# Patient Record
Sex: FEMALE | Race: WHITE | NOT HISPANIC OR LATINO | Employment: OTHER | URBAN - METROPOLITAN AREA
[De-identification: names, ages, dates, MRNs, and addresses within clinical notes are randomized per-mention and may not be internally consistent; named-entity substitution may affect disease eponyms.]

---

## 2017-01-05 ENCOUNTER — TELEPHONE (OUTPATIENT)
Dept: SPINE | Facility: CLINIC | Age: 67
End: 2017-01-05

## 2017-01-05 NOTE — TELEPHONE ENCOUNTER
----- Message from Maryann Delgadillo sent at 1/4/2017  2:51 PM CST -----  Contact: Self  Good afternoon,     Pt is requesting an appt due to back pain.    Pt can be reached at [phone].    Thank you!

## 2017-01-10 DIAGNOSIS — M54.16 LEFT LUMBAR RADICULOPATHY: ICD-10-CM

## 2017-01-10 DIAGNOSIS — M51.36 DDD (DEGENERATIVE DISC DISEASE), LUMBAR: Primary | ICD-10-CM

## 2017-01-10 DIAGNOSIS — M48.061 LUMBAR STENOSIS: ICD-10-CM

## 2017-01-27 RX ORDER — LEVOTHYROXINE SODIUM 100 UG/1
TABLET ORAL
Qty: 90 TABLET | Refills: 0 | Status: SHIPPED | OUTPATIENT
Start: 2017-01-27 | End: 2017-04-25 | Stop reason: SDUPTHER

## 2017-01-31 RX ORDER — GABAPENTIN 300 MG/1
CAPSULE ORAL
Qty: 360 CAPSULE | Refills: 3 | Status: SHIPPED | OUTPATIENT
Start: 2017-01-31 | End: 2018-02-25 | Stop reason: SDUPTHER

## 2017-02-09 ENCOUNTER — PATIENT MESSAGE (OUTPATIENT)
Dept: FAMILY MEDICINE | Facility: CLINIC | Age: 67
End: 2017-02-09

## 2017-02-09 ENCOUNTER — PATIENT MESSAGE (OUTPATIENT)
Dept: SPINE | Facility: CLINIC | Age: 67
End: 2017-02-09

## 2017-02-09 ENCOUNTER — PATIENT MESSAGE (OUTPATIENT)
Dept: PAIN MEDICINE | Facility: CLINIC | Age: 67
End: 2017-02-09

## 2017-02-09 DIAGNOSIS — Z12.39 BREAST CANCER SCREENING: ICD-10-CM

## 2017-02-09 DIAGNOSIS — Z12.31 ENCOUNTER FOR SCREENING MAMMOGRAM FOR MALIGNANT NEOPLASM OF BREAST: ICD-10-CM

## 2017-02-09 DIAGNOSIS — R53.83 FATIGUE, UNSPECIFIED TYPE: Primary | ICD-10-CM

## 2017-02-16 ENCOUNTER — OFFICE VISIT (OUTPATIENT)
Dept: PAIN MEDICINE | Facility: CLINIC | Age: 67
End: 2017-02-16
Payer: MEDICARE

## 2017-02-16 ENCOUNTER — TELEPHONE (OUTPATIENT)
Dept: PAIN MEDICINE | Facility: CLINIC | Age: 67
End: 2017-02-16

## 2017-02-16 VITALS
DIASTOLIC BLOOD PRESSURE: 80 MMHG | SYSTOLIC BLOOD PRESSURE: 120 MMHG | BODY MASS INDEX: 32.46 KG/M2 | TEMPERATURE: 99 F | HEART RATE: 74 BPM | WEIGHT: 198.13 LBS | RESPIRATION RATE: 18 BRPM

## 2017-02-16 DIAGNOSIS — M54.16 LUMBAR RADICULOPATHY: Primary | ICD-10-CM

## 2017-02-16 DIAGNOSIS — M48.061 LUMBAR STENOSIS: ICD-10-CM

## 2017-02-16 DIAGNOSIS — M47.816 FACET HYPERTROPHY OF LUMBAR REGION: ICD-10-CM

## 2017-02-16 PROBLEM — R07.9 CHEST PAIN WITH MODERATE RISK FOR CARDIAC ETIOLOGY: Status: ACTIVE | Noted: 2017-02-16

## 2017-02-16 PROBLEM — R07.9 CHEST PAIN: Status: ACTIVE | Noted: 2017-02-16

## 2017-02-16 PROBLEM — G47.33 OSA (OBSTRUCTIVE SLEEP APNEA): Status: ACTIVE | Noted: 2017-02-16

## 2017-02-16 PROBLEM — I34.0 MITRAL REGURGITATION: Status: ACTIVE | Noted: 2017-02-16

## 2017-02-16 PROCEDURE — 99499 UNLISTED E&M SERVICE: CPT | Mod: S$PBB,,, | Performed by: PHYSICIAN ASSISTANT

## 2017-02-16 PROCEDURE — 99999 PR PBB SHADOW E&M-EST. PATIENT-LVL IV: CPT | Mod: PBBFAC,,, | Performed by: PHYSICIAN ASSISTANT

## 2017-02-16 PROCEDURE — 99214 OFFICE O/P EST MOD 30 MIN: CPT | Mod: S$PBB,,, | Performed by: PHYSICIAN ASSISTANT

## 2017-02-16 PROCEDURE — 99214 OFFICE O/P EST MOD 30 MIN: CPT | Mod: PBBFAC,PO | Performed by: PHYSICIAN ASSISTANT

## 2017-02-16 RX ORDER — ALPRAZOLAM 0.5 MG/1
1 TABLET, ORALLY DISINTEGRATING ORAL ONCE AS NEEDED
Status: CANCELLED | OUTPATIENT
Start: 2017-03-14 | End: 2017-03-14

## 2017-02-16 NOTE — TELEPHONE ENCOUNTER
Patient takes aspirin as a preventative and has been instructed to stop 7 days prior to the injection.

## 2017-02-16 NOTE — PROGRESS NOTES
This note was completed with dictation software and grammatical errors may exist.    CC: back pain    HPI: The patient is a 66 year-old Female with a history of colon CA, sleep apnea, anemia, depression and postlaminectomy syndrome now with myelodysplastic syndrome who presents in referral from Dr. Villanueva.  She returns in follow-up today for back pain.  It has been one year since her last visit.  Since her last visit, she underwent removal spinal cord stimulator and L4/5 hardware on 3/16/16 with Dr. Aldrich.  She states that this was successful in significantly improving her left leg pain but she continues to have severe low back pain.  She describes this as across the low back and burning.  She reports bilateral leg pain about 4 times a week but not as consistent as her constant low back pain.  The leg pain can be in different locations, not always the same leg or the same spot on the leg.  She tried physical therapy recently but this made her worse.  She states that she went to 8 sessions.  She reports having bladder incontinence but this is not new.  She denies bowel incontinence, weakness or numbness.    Pain intervention history:  She is status post caudal epidural steroid injection on 5/7/14 with 90% relief lasting 3 days, then the pain slowly came back over 7 days.  She is status post caudal epidural steroid injection on 6/17/14 with 70% relief.  She is status post caudal epidural steroid injection on 9/10/15 with almost complete relief lasting 5-6 days.  She is status post left L3 and L5 transforaminal epidural steroid injections on 10/14/15 with 0% relief.  She is status post left L4 transforaminal epidural steroid injection on 1/13/16 with about 90% relief lasting one week.  She underwent removal spinal cord stimulator and L4/5 hardware on 3/16/16 with Dr. Aldrich.    ROS:She reports fatigue, joint pain.  Balance of review of systems is negative.    Medical, surgical, family and social history  reviewed elsewhere in record.    Medications/Allergies: See med card    Vitals:    02/16/17 0847   BP: 120/80   Pulse: 74   Resp: 18   Temp: 98.6 °F (37 °C)   TempSrc: Oral   Weight: 89.9 kg (198 lb 1.6 oz)   PainSc:   4   PainLoc: Back         Physical exam:  Gen: A and O x3, pleasant, well-groomed  Skin: No rashes or obvious lesions  HEENT: PERRLA  CVS: Regular rate and rhythm, normal S1 and S2, no murmurs.  Resp: Clear to auscultation bilaterally, no wheezes or rales.  Abdomen: Soft, NT/ND, normal bowel sounds present.  Musculoskeletal: Nonantalgic gait.    Neuro:  Lower extremities: 5/5 strength bilaterally  Reflexes: Patellar 0+, Achilles 2+ bilaterally.  Sensory:  Intact and symmetrical to light touch and pinprick in L2-S1 dermatomes bilaterally.    Lumbar spine:  Lumbar spine: ROM is full with flexion and extension with increased low back pain during and flexion and end extension.  Luca's test is negative bilaterally.  Supine straight leg raise causes back pain only.  Internal and external rotation of the hip causes no increased pain on either side.  Myofascial exam: Mild tenderness to palpation to the right lower lumbar paraspinous muscles.      Imaging:  MRI LUMBAR SPINE W/WO CONTRAST ON 8/22/06  1. MINIMAL L3-4 DISC BULGE CAUSING MINIMAL THECAL SAC COMPRESSION, AND THERE IS ALSO MILD BILATERAL DEGENERATIVE FACET ARTHROSIS.   2. L4-5 DISCECTOMY AND L5 LAMINECTOMY WITH NO EVIDENCE OF A SIGNIFICANT SPINAL CANAL COMPROMISE. THERE IS MILD BILATERAL FORAMINAL NARROWING AT THE L4-5 LEVEL   3. DEGENERATION AND MILD BULGING OF THE L5-S1 DISC WITH ASSOCIATED MILD THECAL SAC COMPRESSION.   4. ROUNDED MASS ARISING FROM THE LEFT KIDNEY WHICH MOST LIKELY REPRESENTS A CYST. ULTRASOUND IS RECOMMENDED TO EXCLUDE A RENAL NEOPLASM.    2/11/14 x-ray tib-fib:  No obvious right tibial or fibular fracture is noted. No worrisome abnormality is noted. A linear metallic density is noted on the lateral view overlying the forefoot  dorsally near the navicular cuneiform articulation. This could relate to a foreign body or overlying density relating to clothing. Please clinically correlate a small quadriceps patellar spur is noted.    10/1/15 CT lumbar spine  T12/L1: Mild disk bulging is evident and degenerative facet changes are noted at this level. The canal and foramina are grossly intact.  L1/L2: Mild annular disk bulge and mild degenerative facet changes produce only minimal canal and foraminal distortion to  L2/L3: Degenerative facet changes noted in the mild annular disk and facet disease.  L3/L4: Posterior canal ligamentous hypertrophy and degenerative facet changes are present. There is moderate annular disk bulging. This combines to produce mild canal and left greater than right foraminal narrowing. This is slightly worsened from the previous exam.  L4/L5: The canal is grossly intact as the surgical level. Degenerative facet changes are noted bilaterally.  L5/S1: Moderate degenerative facet changes noted bilaterally and there is suggestion of disk bulging with left greater than right foraminal narrowing.    11/16/16 MRI lumbar spine with and without contrast  There have been prior laminectomies at L4 and L5, with interbody spacer present at L4-5.  Vertebral body height and alignment are within normal limits.  Marrow signal is heterogeneous.  There is moderate marrow enhancement within the L4 and L5 vertebral bodies centered about the disc space which is likely reactive.  The conus terminates at T12-L1.  Disc desiccation is present throughout the lumbar spine.  Mild loss of disc height is present at the L5-S1 level.  L1-L2:  No disc herniation. No spinal canal or neuroforaminal narrowing.  L2-L3:  No disc herniation. No spinal canal or neuroforaminal narrowing.  L3-L4:  There is mild diffuse disc bulging and moderate facet arthropathy with significant thickening of the ligament of flavum.  The findings contribute to mild spinal canal  narrowing without significant neuroforaminal narrowing.  L4-L5:  Moderate bilateral neuroforaminal narrowing is present which appears predominantly due to facet arthropathy, with some contribution from enhancing scar tissue formation within the neuroforamen on the right.  No spinal canal narrowing.  L5-S1:  There is mild broad-based posterior bulging of the disc, abdomen and moderate bilateral facet arthropathy.  There is resultant mild bilateral neuroforaminal narrowing.      Assessment:  The patient is a 66year-old Female with a history of colon CA, sleep apnea, anemia, depression and postlaminectomy syndrome now with myelodysplastic syndrome who presents in referral from Dr. Villanueva.  1. Lumbar radiculopathy     2. Facet hypertrophy of lumbar region     3. Lumbar stenosis         Plan:  1.  I reviewed the patient's lumbar spine MRI with her and we discussed that she does have mild canal stenosis at L3-4 as well as bilateral foraminal narrowing at L4-5 and L5-S1 that can explain her pain.  I will schedule her for a caudal NIC to cover several levels.  This can be repeated if she has some relief but not full.  If she does not have any relief, her pain may be facet mediated and consideration can be made for bilateral L3/4an L5/S1 facet joint injections versus medial branch blocks for those levels.  2.  Follow-up in 4 weeks post procedure or sooner as needed.

## 2017-02-17 PROBLEM — K59.00 CONSTIPATION: Status: ACTIVE | Noted: 2017-02-17

## 2017-02-17 PROBLEM — I24.9 ACS (ACUTE CORONARY SYNDROME): Status: ACTIVE | Noted: 2017-02-17

## 2017-02-27 ENCOUNTER — TELEPHONE (OUTPATIENT)
Dept: CARDIOLOGY | Facility: CLINIC | Age: 67
End: 2017-02-27

## 2017-02-27 ENCOUNTER — HOSPITAL ENCOUNTER (OUTPATIENT)
Dept: RADIOLOGY | Facility: HOSPITAL | Age: 67
Discharge: HOME | End: 2017-02-27
Attending: FAMILY MEDICINE
Payer: MEDICARE

## 2017-02-27 ENCOUNTER — PATIENT MESSAGE (OUTPATIENT)
Dept: FAMILY MEDICINE | Facility: CLINIC | Age: 67
End: 2017-02-27

## 2017-02-27 ENCOUNTER — PATIENT MESSAGE (OUTPATIENT)
Dept: CARDIOLOGY | Facility: CLINIC | Age: 67
End: 2017-02-27

## 2017-02-27 DIAGNOSIS — R92.8 MAMMOGRAM ABNORMAL: ICD-10-CM

## 2017-02-27 PROCEDURE — 77065 DX MAMMO INCL CAD UNI: CPT | Mod: 26,LT,, | Performed by: RADIOLOGY

## 2017-02-27 PROCEDURE — 77061 BREAST TOMOSYNTHESIS UNI: CPT | Mod: TC,LT

## 2017-02-27 PROCEDURE — 77061 BREAST TOMOSYNTHESIS UNI: CPT | Mod: 26,LT,, | Performed by: RADIOLOGY

## 2017-02-27 NOTE — TELEPHONE ENCOUNTER
You'll have to look at my schedule, this is a bad week, Tuesday is a holiday and I will not be here from Friday through the weekend.  If it is not emergent then I can see her in the next few weeks.  If the patient needs more urgent follow-up then she should follow with transitional care clinic as with most hospital discharges.

## 2017-02-27 NOTE — TELEPHONE ENCOUNTER
Please see pt message below. Please advise when pt can dorinda worked in for hospital follow up for chest pain.

## 2017-03-06 ENCOUNTER — OFFICE VISIT (OUTPATIENT)
Dept: CARDIOLOGY | Facility: CLINIC | Age: 67
End: 2017-03-06
Payer: MEDICARE

## 2017-03-06 VITALS
DIASTOLIC BLOOD PRESSURE: 64 MMHG | BODY MASS INDEX: 31.24 KG/M2 | HEART RATE: 66 BPM | WEIGHT: 199.06 LBS | HEIGHT: 67 IN | SYSTOLIC BLOOD PRESSURE: 113 MMHG

## 2017-03-06 DIAGNOSIS — I34.0 NON-RHEUMATIC MITRAL REGURGITATION: ICD-10-CM

## 2017-03-06 DIAGNOSIS — I48.0 PAROXYSMAL ATRIAL FIBRILLATION: Primary | Chronic | ICD-10-CM

## 2017-03-06 DIAGNOSIS — I25.119 ATHEROSCLEROSIS OF NATIVE CORONARY ARTERY OF NATIVE HEART WITH ANGINA PECTORIS: ICD-10-CM

## 2017-03-06 DIAGNOSIS — I48.91 ATRIAL FIBRILLATION, UNSPECIFIED TYPE: Primary | ICD-10-CM

## 2017-03-06 DIAGNOSIS — D61.818 PANCYTOPENIA: ICD-10-CM

## 2017-03-06 DIAGNOSIS — R94.39 ABNORMAL THALLIUM STRESS TEST: ICD-10-CM

## 2017-03-06 PROCEDURE — 99213 OFFICE O/P EST LOW 20 MIN: CPT | Mod: S$PBB,,, | Performed by: INTERNAL MEDICINE

## 2017-03-06 PROCEDURE — 99499 UNLISTED E&M SERVICE: CPT | Mod: S$PBB,,, | Performed by: INTERNAL MEDICINE

## 2017-03-06 PROCEDURE — 99999 PR PBB SHADOW E&M-EST. PATIENT-LVL III: CPT | Mod: PBBFAC,,, | Performed by: INTERNAL MEDICINE

## 2017-03-06 PROCEDURE — 99213 OFFICE O/P EST LOW 20 MIN: CPT | Mod: PBBFAC,PO | Performed by: INTERNAL MEDICINE

## 2017-03-06 NOTE — MR AVS SNAPSHOT
[facility]  [address] [address]  Phone: [phone]                  Charmaine Whalen   3/6/2017 2:20 PM   Office Visit    Description:  Female : 1950   Provider:  Harlan Alcantar MD   Department:  [facility] - Cardiology           Reason for Visit     Post-op Evaluation     Chest Pain           Diagnoses this Visit        Comments    Paroxysmal atrial fibrillation    -  Primary     Non-rheumatic mitral regurgitation         Atherosclerosis of native coronary artery of native heart with angina pectoris         Pancytopenia         Abnormal thallium stress test                To Do List           Future Appointments        Provider Department Dept Phone    3/15/2017 1:40 PM Shashi Villanueva MD [facility] Family [facility] [phone]    4/10/2017 3:00 PM RUMA Serra [facility] Pain Management [phone]      Your Future Surgeries/Procedures     Mar 14, 2017   Surgery with Adan Samuels MD   [facility]    [address] [facility] [id number] [phone]              Goals (5 Years of Data)     None      Follow-Up and Disposition     Return in about 6 months (around 2017).      [facility]     [facility] -  Assistance  Registered nurses in the [facility] provide clinical advisement, health education, appointment booking, and other advisory services.  Call for this free service at [phone].             Medications           Message regarding Medications     Verify the changes and/or additions to your medication regime listed below are the same as discussed with your clinician today.  If any of these changes or additions are incorrect, please notify your healthcare provider.             Verify that the below list of medications is an accurate representation of the medications you are currently taking.  If none reported, the list may be blank. If  "incorrect, please contact your healthcare provider. Carry this list with you in case of emergency.           Current Medications     ascorbic acid (VITAMIN C) 1000 MG tablet Take 1,000 mg by mouth every morning.     aspirin (ECOTRIN) 81 MG EC tablet Take 1 tablet (81 mg total) by mouth once daily.    azelastine (ASTELIN) 137 mcg (0.1 %) nasal spray 1 spray (137 mcg total) by Nasal route 2 (two) times daily.    b complex vitamins tablet Take 1 tablet by mouth every morning.     buPROPion (WELLBUTRIN XL) 300 MG 24 hr tablet Take 1 tablet (300 mg total) by mouth every morning.    cholecalciferol, vitamin D3, (VITAMIN D) 1,000 unit capsule Take 2,000 Units by mouth every morning.     docusate sodium (STOOL SOFTENER) 100 mg capsule Take 100 mg by mouth 2 (two) times daily.    fluoxetine (PROZAC) 40 MG capsule Take 1 capsule (40 mg total) by mouth every morning.    fluticasone (FLONASE) 50 mcg/actuation nasal spray 2 SPRS IN EACH NOSTRIL EVERY DAY    gabapentin (NEURONTIN) 300 MG capsule TAKE 2 CAPSULES BY MOUTH TWICE DAILY    L. ACIDOPHILUS/BIFIDO LONGUM (PROBIOTIC PEARLS ORAL) Take by mouth.    Lactobacillus rhamnosus GG (CULTURELLE) 10 billion cell capsule Take 1 capsule by mouth once daily.    levothyroxine (SYNTHROID) 100 MCG tablet TAKE 1 TABLET BY MOUTH EVERY DAY    nystatin (MYCOSTATIN) cream Apply topically 2 (two) times daily.    pantoprazole (PROTONIX) 40 MG tablet TAKE 1 TABLET(40 MG) BY MOUTH EVERY DAY    ranitidine (ZANTAC) 300 MG tablet TAKE 1 TABLET BY MOUTH EVERY EVENING ABOUT 30 TO 60 MINUTES BEFORE BEDTIME    trazodone (DESYREL) 100 MG tablet TAKE 1 TO 2 TABLETS BY MOUTH NIGHTLY FOR SLEEP           Clinical Reference Information           Your Vitals Were     BP Pulse Height Weight BMI    113/64 (BP Location: Left arm, Patient Position: Sitting, BP Method: Automatic) 66 5' 7" (1.702 m) 90.3 kg (199 lb 1.2 oz) 31.18 kg/m2      Blood Pressure          Most Recent Value    BP  113/64      Allergies as of " 3/6/2017     Sulfa (Sulfonamide Antibiotics)      Immunizations Administered on Date of Encounter - 3/6/2017     None      Language Assistance Services     ATTENTION: Language assistance services are available, free of charge. Please call [phone].      ATENCIÓN: Si bria hearn, tiene a iverson disposición servicios gratuitos de asistencia lingüística. Llame al [phone].     CHÚ Ý: N?u b?n nói Ti?ng Vi?t, có các d?ch v? h? tr? ngôn ng? mi?n phí dành cho b?n. G?i s? [phone].         [facility] complies with applicable Federal civil rights laws and does not discriminate on the basis of race, color, national origin, age, disability, or sex.

## 2017-03-06 NOTE — PROGRESS NOTES
Subjective:    Patient ID:  Charmaine Whalen is a 67 y.o. female who presents for follow-up of Post-op Evaluation ([facility] 02/18/17) and Chest Pain (pain scale 8/10)      HPI   Here for follow up of recent angiogram/MR. Still mild RAMIREZ.       Review of Systems   Constitution: Negative for malaise/fatigue.   Eyes: Negative for blurred vision.   Cardiovascular: Negative for chest pain, claudication, cyanosis, dyspnea on exertion, irregular heartbeat, leg swelling, near-syncope, orthopnea, palpitations, paroxysmal nocturnal dyspnea and syncope.   Respiratory: Negative for cough and shortness of breath.    Hematologic/Lymphatic: Does not bruise/bleed easily.   Musculoskeletal: Negative for back pain, falls, joint pain, muscle cramps, muscle weakness and myalgias.   Gastrointestinal: Negative for abdominal pain, change in bowel habit, nausea and vomiting.   Genitourinary: Negative for urgency.   Neurological: Negative for dizziness, focal weakness and light-headedness.        Objective:    Physical Exam   Constitutional: She is oriented to person, place, and time. She appears well-developed and well-nourished.   Neck: Normal range of motion. No JVD present.   Cardiovascular: Normal rate, regular rhythm, normal heart sounds and intact distal pulses.    Pulmonary/Chest: Effort normal and breath sounds normal.   Neurological: She is alert and oriented to person, place, and time.   Skin: Skin is warm and dry.   Psychiatric: She has a normal mood and affect.   Nursing note and vitals reviewed.            ..    Chemistry        Component Value Date/Time     02/17/2017 2116    K 4.1 02/17/2017 2116     02/17/2017 2116    CO2 29 02/17/2017 2116    BUN 14 02/17/2017 2116    CREATININE 0.87 02/17/2017 2116     (H) 02/17/2017 2116        Component Value Date/Time    CALCIUM 8.8 02/17/2017 2116    ALKPHOS 95 02/16/2017 1819    AST 34 02/16/2017 1819    AST 24 11/09/2015 1449    ALT 43 02/16/2017 1819    BILITOT 0.7  02/16/2017 1819            ..  Lab Results   Component Value Date    CHOL 143 02/17/2017    CHOL 184 01/08/2015    CHOL 194 07/23/2013     Lab Results   Component Value Date    HDL 43 02/17/2017    HDL 53 01/08/2015    HDL 44 07/23/2013     Lab Results   Component Value Date    LDLCALC 86.6 02/17/2017    LDLCALC 116.4 01/08/2015    LDLCALC 130.0 07/23/2013     Lab Results   Component Value Date    TRIG 67 02/17/2017    TRIG 73 01/08/2015    TRIG 98 07/23/2013     Lab Results   Component Value Date    CHOLHDL 30.1 02/17/2017    CHOLHDL 28.8 01/08/2015    CHOLHDL 22.7 07/23/2013     ..  Lab Results   Component Value Date    WBC 3.75 (L) 02/17/2017    HGB 10.1 (L) 02/17/2017    HCT 31.0 (L) 02/17/2017     (H) 02/17/2017     (L) 02/17/2017       Test(s) Reviewed  I have reviewed the following in detail:  [] Stress test   [x] Angiography   [x] Echocardiogram   [] Labs   [] Other:       Assessment:         ICD-10-CM ICD-9-CM   1. Paroxysmal atrial fibrillation I48.0 427.31   2. Non-rheumatic mitral regurgitation I34.0 424.0   3. Atherosclerosis of native coronary artery of native heart with angina pectoris I25.119 414.01     413.9   4. Pancytopenia D61.818 284.19   5. Abnormal thallium stress test R94.39 794.39     Problem List Items Addressed This Visit     Abnormal thallium stress test    Overview     2/17  Mild, moderate diagonal disease.  Small distal LAD.  Mild diastolic dysfunction.         AF (atrial fibrillation) - Primary (Chronic)    Overview     PAF in setting of iatrogenic hyperthyroid                                    s/p AFL RFA 2008         Atherosclerotic heart disease of native coronary artery with angina pectoris    Overview     2/17 [facility]  Mild, moderate diagonal disease.  Small distal LAD.  Mild diastolic dysfunction.         Mitral regurgitation    Pancytopenia           Plan:           Return to clinic 6 months   Low level/low impact aerobic exercise 5x's/wk. Heart healthy diet and risk  factor modification.    See labs and med orders.  CFD 6 month follow MR

## 2017-03-13 ENCOUNTER — PATIENT MESSAGE (OUTPATIENT)
Dept: FAMILY MEDICINE | Facility: CLINIC | Age: 67
End: 2017-03-13

## 2017-03-13 ENCOUNTER — TELEPHONE (OUTPATIENT)
Dept: PAIN MEDICINE | Facility: CLINIC | Age: 67
End: 2017-03-13

## 2017-03-13 ENCOUNTER — HOSPITAL ENCOUNTER (OUTPATIENT)
Dept: RADIOLOGY | Facility: HOSPITAL | Age: 67
Discharge: HOME | End: 2017-03-13
Attending: FAMILY MEDICINE
Payer: MEDICARE

## 2017-03-13 DIAGNOSIS — R05.9 COUGH: Primary | ICD-10-CM

## 2017-03-13 DIAGNOSIS — R05.9 COUGH: ICD-10-CM

## 2017-03-13 PROCEDURE — 71020 XR CHEST PA AND LATERAL: CPT | Mod: TC,PO

## 2017-03-13 PROCEDURE — 71020 XR CHEST PA AND LATERAL: CPT | Mod: 26,,, | Performed by: RADIOLOGY

## 2017-03-13 NOTE — TELEPHONE ENCOUNTER
----- Message from Kierra Vieira sent at 3/13/2017  9:10 AM CDT -----  Contact: lai   Calling to cancel injection   Call back

## 2017-03-13 NOTE — TELEPHONE ENCOUNTER
See my chart message.  I entered the order for the chest x-ray.  Let's not wait until Wednesday have her come in today or tomorrow morning at the latest for the x-ray

## 2017-03-13 NOTE — TELEPHONE ENCOUNTER
Patient being seen on Wed and would like to know if you would like a chest xray before. Please advise. Patient has chest congestion, see message below.

## 2017-03-14 ENCOUNTER — HOSPITAL ENCOUNTER (OUTPATIENT)
Dept: RADIOLOGY | Facility: HOSPITAL | Age: 67
Discharge: HOME | End: 2017-03-14
Attending: ANESTHESIOLOGY
Payer: MEDICARE

## 2017-03-14 DIAGNOSIS — M51.36 DDD (DEGENERATIVE DISC DISEASE), LUMBAR: ICD-10-CM

## 2017-03-15 ENCOUNTER — OFFICE VISIT (OUTPATIENT)
Dept: FAMILY MEDICINE | Facility: CLINIC | Age: 67
End: 2017-03-15
Payer: MEDICARE

## 2017-03-15 VITALS
RESPIRATION RATE: 18 BRPM | SYSTOLIC BLOOD PRESSURE: 118 MMHG | HEIGHT: 67 IN | HEART RATE: 60 BPM | BODY MASS INDEX: 31.35 KG/M2 | DIASTOLIC BLOOD PRESSURE: 72 MMHG | WEIGHT: 199.75 LBS

## 2017-03-15 DIAGNOSIS — J06.9 UPPER RESPIRATORY TRACT INFECTION, UNSPECIFIED TYPE: Primary | ICD-10-CM

## 2017-03-15 DIAGNOSIS — R05.9 COUGH: ICD-10-CM

## 2017-03-15 DIAGNOSIS — I25.119 ATHEROSCLEROSIS OF NATIVE CORONARY ARTERY OF NATIVE HEART WITH ANGINA PECTORIS: ICD-10-CM

## 2017-03-15 PROCEDURE — 99213 OFFICE O/P EST LOW 20 MIN: CPT | Mod: PBBFAC,PO | Performed by: FAMILY MEDICINE

## 2017-03-15 PROCEDURE — 99999 PR PBB SHADOW E&M-EST. PATIENT-LVL III: CPT | Mod: PBBFAC,,, | Performed by: FAMILY MEDICINE

## 2017-03-15 PROCEDURE — 99214 OFFICE O/P EST MOD 30 MIN: CPT | Mod: S$PBB,,, | Performed by: FAMILY MEDICINE

## 2017-03-15 PROCEDURE — 99499 UNLISTED E&M SERVICE: CPT | Mod: S$PBB,,, | Performed by: FAMILY MEDICINE

## 2017-03-15 RX ORDER — CODEINE PHOSPHATE AND GUAIFENESIN 10; 100 MG/5ML; MG/5ML
5 SOLUTION ORAL 3 TIMES DAILY PRN
Qty: 240 ML | Refills: 0 | Status: SHIPPED | OUTPATIENT
Start: 2017-03-15 | End: 2017-04-15

## 2017-03-15 NOTE — PROGRESS NOTES
Subjective:     THIS DOCUMENT WAS MADE IN PART WITH [employer] DICTATION SOFTWARE. OCCASIONALLY THIS SOFTWARE MAY MISINTERPRET WORDS OR PHRASES.     Patient ID: Charmaine Whalen is a 67 y.o. female.    Chief Complaint: Hospital Follow Up (chest pains); Cough (week ); and Nasal Congestion    Cough   This is a new problem. The current episode started 1 to 4 weeks ago. The problem has been waxing and waning. The cough is productive of sputum. Associated symptoms include chest pain, a fever, headaches, nasal congestion, postnasal drip, rhinorrhea, a sore throat, shortness of breath and wheezing (better now). The symptoms are aggravated by lying down. Treatments tried: mucinex, claritin. The treatment provided mild relief. There is no history of COPD.      I did review her recent hospitalization. She was admitted with chest pain. Stress test did suggest possible myocardial ischemia. She underwent catheterization which did reveal noninclusive coronary artery disease. It was felt that her symptoms may be G.I. Related. She is doing better. But does report some chest pain sometimes after eating some indigestion as well.    Active Ambulatory Problems     Diagnosis Date Noted    Generalized anxiety disorder 07/24/2012    Lattice degeneration of peripheral retina, bilateral 12/12/2012    Posterior vitreous detachment 12/12/2012    Nuclear sclerosis 12/12/2012    Vitreous hemorrhage 12/12/2012    Retinal tear 12/12/2012    AF (atrial fibrillation) 12/28/2012    MDS (myelodysplastic syndrome) 01/28/2013    Hypothyroidism 01/28/2013    History of colon cancer 07/03/2013    RAMIREZ (dyspnea on exertion) 07/03/2013    Major depressive disorder, recurrent episode, mild 09/21/2013    Pancytopenia 11/18/2013    DDD (degenerative disc disease), lumbar 03/14/2014    Degeneration of lumbar or lumbosacral intervertebral disc 05/07/2014    Retinal break of left eye 10/29/2014    Colon cancer screening 06/19/2015    Spondylosis  of lumbar region without myelopathy or radiculopathy 10/06/2015    Lumbar stenosis 10/06/2015    GERD (gastroesophageal reflux disease) 12/01/2015    Lumbar radiculopathy 01/13/2016    Chronic back pain 03/08/2016    Chronic pain syndrome 03/08/2016    Encounter for postoperative wound check 04/19/2016    Need for rabies vaccination 10/10/2016    Chest pain with moderate risk for cardiac etiology 02/16/2017    CORWIN (obstructive sleep apnea) 02/16/2017    Mitral regurgitation 02/16/2017    Chest pain 02/16/2017    Abnormal thallium stress test     Atherosclerotic heart disease of native coronary artery with angina pectoris     ACS (acute coronary syndrome) 02/17/2017    Constipation 02/17/2017     Resolved Ambulatory Problems     Diagnosis Date Noted    No Resolved Ambulatory Problems     Past Medical History:   Diagnosis Date    *Atrial fibrillation     Allergy     Anticoagulant long-term use     Anxiety     Atherosclerotic heart disease of native coronary artery with angina pectoris     Cataract     Chronic pain     Colon cancer     Colon polyp     DDD (degenerative disc disease), lumbar     Depression     Diverticulosis     Encounter for blood transfusion     Esophageal stenosis     Fibromyalgia     Gastritis     GERD (gastroesophageal reflux disease)     Hiatal hernia     History of psychiatric hospitalization     History of renal calculi     Hypersomnia with sleep apnea     Hypothyroidism 1/28/2013    Irritable bowel syndrome     Macular degeneration     Meningitis     Myelodysplastic syndrome     CORWIN (obstructive sleep apnea)     Personal history of colonic polyps     PVD (posterior vitreous detachment)     Retinal detachment     Rheumatic fever     Supraventricular tachycardia          Review of Systems   Constitutional: Positive for activity change and fever. Negative for unexpected weight change.   HENT: Positive for postnasal drip, rhinorrhea, sore throat and  trouble swallowing. Negative for hearing loss.    Eyes: Positive for discharge and visual disturbance.   Respiratory: Positive for cough, chest tightness, shortness of breath and wheezing (better now).    Cardiovascular: Positive for chest pain and palpitations.   Gastrointestinal: Positive for constipation and diarrhea. Negative for blood in stool and vomiting.   Endocrine: Positive for polydipsia and polyuria.   Genitourinary: Negative for difficulty urinating, dysuria, hematuria and menstrual problem.   Musculoskeletal: Negative for arthralgias and joint swelling.   Neurological: Positive for weakness and headaches.   Psychiatric/Behavioral: Positive for dysphoric mood. Negative for confusion.       Objective:      Physical Exam   Constitutional: She is oriented to person, place, and time. She appears well-developed and well-nourished.   HENT:   Head: Normocephalic and atraumatic.   Right Ear: External ear normal.   Left Ear: External ear normal.   Mouth/Throat: Oropharynx is clear and moist. No oropharyngeal exudate.   Eyes: Conjunctivae are normal. Pupils are equal, round, and reactive to light. Right eye exhibits no discharge. Left eye exhibits no discharge. No scleral icterus.   Neck: Normal range of motion. Neck supple. No thyromegaly present.   Cardiovascular: Normal rate, regular rhythm and normal heart sounds.    No murmur heard.  Pulmonary/Chest: Effort normal and breath sounds normal. No respiratory distress.   Both lung fields were completely clear no wheezing no crackles.   Lymphadenopathy:     She has no cervical adenopathy.   Neurological: She is alert and oriented to person, place, and time.   Skin: Skin is dry. No rash noted. She is not diaphoretic.   Psychiatric: She has a normal mood and affect. Her behavior is normal.   Vitals reviewed.     Reviewed the chest x-ray from two days ago. I personally look at the images as well as the radiology impartation. No evidence of acute  abnormality.  Assessment:       1. Upper respiratory tract infection, unspecified type    2. Cough    3. Atherosclerosis of native coronary artery of native heart with angina pectoris        Plan:       Charmaine was seen today for hospital follow up, cough and nasal congestion.    Diagnoses and all orders for this visit:    Upper respiratory tract infection, unspecified type  -     guaifenesin-codeine 100-10 mg/5 ml (TUSSI-ORGANIDIN NR)  mg/5 mL syrup; Take 5 mLs by mouth 3 (three) times daily as needed for Cough.     Suspected viral respiratory syndrome with URI components and resolving bronchitis. No indication for antibiotic currently. Although she will let me know if there's any worsening. Narcotic precautions and Guaifenisin with codeine to help with symptoms temporarily    Cough   as above    Atherosclerosis of native coronary artery of native heart with angina pectoris   I did review her recent hospitalization and discuss this with her as well.    Other orders   Follow as needed

## 2017-03-19 ENCOUNTER — PATIENT MESSAGE (OUTPATIENT)
Dept: FAMILY MEDICINE | Facility: CLINIC | Age: 67
End: 2017-03-19

## 2017-03-20 ENCOUNTER — TELEPHONE (OUTPATIENT)
Dept: FAMILY MEDICINE | Facility: CLINIC | Age: 67
End: 2017-03-20

## 2017-03-20 NOTE — TELEPHONE ENCOUNTER
----- Message from Nusrat Harrison sent at 3/17/2017  4:27 PM CDT -----  Contact: self [phone]  She is still contending with the bad cough.  She is asking that you prescribe antibiotics.  Thank you!

## 2017-03-20 NOTE — TELEPHONE ENCOUNTER
----- Message from Phyllis Gongora sent at 3/20/2017  9:15 AM CDT -----  Patient is returning office call but states it is not necessary to be on antibiotics. She knows it is viral and will see a pulmonologist. If any questions, please call at [phone].

## 2017-03-31 ENCOUNTER — TELEPHONE (OUTPATIENT)
Dept: PAIN MEDICINE | Facility: CLINIC | Age: 67
End: 2017-03-31

## 2017-03-31 ENCOUNTER — PATIENT MESSAGE (OUTPATIENT)
Dept: PAIN MEDICINE | Facility: CLINIC | Age: 67
End: 2017-03-31

## 2017-03-31 ENCOUNTER — PATIENT MESSAGE (OUTPATIENT)
Dept: FAMILY MEDICINE | Facility: CLINIC | Age: 67
End: 2017-03-31

## 2017-04-02 ENCOUNTER — PATIENT MESSAGE (OUTPATIENT)
Dept: PAIN MEDICINE | Facility: CLINIC | Age: 67
End: 2017-04-02

## 2017-04-03 ENCOUNTER — TELEPHONE (OUTPATIENT)
Dept: PAIN MEDICINE | Facility: CLINIC | Age: 67
End: 2017-04-03

## 2017-04-03 NOTE — TELEPHONE ENCOUNTER
Patient is scheduled for epidural steroid injection on 4/25 and will need to stop Asprin 7 days prior. Please advise if this is okay. Thanks.

## 2017-04-25 ENCOUNTER — HOSPITAL ENCOUNTER (OUTPATIENT)
Dept: RADIOLOGY | Facility: HOSPITAL | Age: 67
Discharge: HOME | End: 2017-04-25
Attending: ANESTHESIOLOGY | Admitting: ANESTHESIOLOGY
Payer: MEDICARE

## 2017-04-25 ENCOUNTER — HOSPITAL ENCOUNTER (OUTPATIENT)
Facility: HOSPITAL | Age: 67
Discharge: HOME | End: 2017-04-25
Attending: ANESTHESIOLOGY | Admitting: ANESTHESIOLOGY
Payer: MEDICARE

## 2017-04-25 ENCOUNTER — SURGERY (OUTPATIENT)
Age: 67
End: 2017-04-25

## 2017-04-25 DIAGNOSIS — M54.16 LUMBAR RADICULOPATHY: ICD-10-CM

## 2017-04-25 PROCEDURE — 25000003 PHARM REV CODE 250: Mod: PO | Performed by: ANESTHESIOLOGY

## 2017-04-25 PROCEDURE — 62323 NJX INTERLAMINAR LMBR/SAC: CPT | Mod: PO | Performed by: ANESTHESIOLOGY

## 2017-04-25 PROCEDURE — 62322 NJX INTERLAMINAR LMBR/SAC: CPT | Mod: PO | Performed by: ANESTHESIOLOGY

## 2017-04-25 PROCEDURE — 25500020 PHARM REV CODE 255: Mod: PO | Performed by: ANESTHESIOLOGY

## 2017-04-25 PROCEDURE — 62323 NJX INTERLAMINAR LMBR/SAC: CPT | Mod: ,,, | Performed by: ANESTHESIOLOGY

## 2017-04-25 RX ORDER — SODIUM CHLORIDE 9 MG/ML
INJECTION, SOLUTION INTRAMUSCULAR; INTRAVENOUS; SUBCUTANEOUS
Status: DISCONTINUED | OUTPATIENT
Start: 2017-04-25 | End: 2017-04-25 | Stop reason: HOSPADM

## 2017-04-25 RX ORDER — LIDOCAINE HYDROCHLORIDE 10 MG/ML
INJECTION, SOLUTION EPIDURAL; INFILTRATION; INTRACAUDAL; PERINEURAL
Status: DISCONTINUED | OUTPATIENT
Start: 2017-04-25 | End: 2017-04-25 | Stop reason: HOSPADM

## 2017-04-25 RX ORDER — ALPRAZOLAM 0.5 MG/1
1 TABLET, ORALLY DISINTEGRATING ORAL ONCE AS NEEDED
Status: COMPLETED | OUTPATIENT
Start: 2017-04-25 | End: 2017-04-25

## 2017-04-25 RX ORDER — LEVOTHYROXINE SODIUM 100 UG/1
TABLET ORAL
Qty: 90 TABLET | Refills: 1 | Status: SHIPPED | OUTPATIENT
Start: 2017-04-25 | End: 2017-10-15 | Stop reason: SDUPTHER

## 2017-04-25 RX ADMIN — SODIUM CHLORIDE 4 ML: 9 INJECTION INTRAMUSCULAR; INTRAVENOUS; SUBCUTANEOUS at 03:04

## 2017-04-25 RX ADMIN — ALPRAZOLAM 0.5 MG: 0.5 TABLET, ORALLY DISINTEGRATING ORAL at 02:04

## 2017-04-25 RX ADMIN — IOHEXOL 3 ML: 300 INJECTION, SOLUTION INTRAVENOUS at 03:04

## 2017-04-25 RX ADMIN — LIDOCAINE HYDROCHLORIDE 5 ML: 10 INJECTION, SOLUTION EPIDURAL; INFILTRATION; INTRACAUDAL; PERINEURAL at 03:04

## 2017-04-25 NOTE — H&P
CC: Back pain    HPI: The patient is a 68yo woman with a history of lumbar radiculopathy here for caudal NIC. There are no major changes in history and physical from 2/16/17.    Past Medical History:   Diagnosis Date    *Atrial fibrillation     History of Cardiac Ablation    Allergy     Anticoagulant long-term use     ASA 81 mg    Anxiety     Atherosclerotic heart disease of native coronary artery with angina pectoris     2/17 LHC Mild, moderate diagonal disease. Small distal LAD. Mild diastolic dysfunction.    Cataract     OU    Chronic pain     Leg, lower back    Colon cancer     Colon polyp     DDD (degenerative disc disease), lumbar     h/o sciatica    Depression     Diverticulosis     Encounter for blood transfusion     Esophageal stenosis     Fibromyalgia     Gastritis     GERD (gastroesophageal reflux disease)     hiatal as well    Hiatal hernia     History of psychiatric hospitalization     1997 at [facility]    History of renal calculi     Hypersomnia with sleep apnea     uses C-pap    Hypothyroidism 1/28/2013    Hashimoto's, with Hypothyroidism    Irritable bowel syndrome     Macular degeneration     lattice-OU    Meningitis     age 3    Myelodysplastic syndrome     bone marrow cancer    CORWIN (obstructive sleep apnea)     Personal history of colonic polyps     PVD (posterior vitreous detachment)     OU    Retinal detachment     HST-OS    Rheumatic fever     as a child    Supraventricular tachycardia        Past Surgical History:   Procedure Laterality Date    APPENDECTOMY      ATRIAL ABLATION SURGERY      BACK SURGERY      Right Spinal Cord Stimulator    BONE MARROW BIOPSY      myelodysplastic syndrome    CHOLECYSTECTOMY  11/26/2001    Laparascopic    COLON SURGERY  ~2011  ([facility]?  [facility])    Colon Resection (sigmoid?)    COLONOSCOPY  6/2015    Dr. orlando, repeat in 3 years    COLONOSCOPY W/ BIOPSIES AND POLYPECTOMY  5/05/2010  [facility]    COLONOSCOPY W/ POLYPECTOMY   "6/19/2015  Michael    Four 1 to 4 mm polyps at the hepatic flexure and distal ascending colon.  -Tubulovillous adenoma.   Diverticulosis in the sigmoid colon.  Patent end-to-end ileo-colonic anastomosis in the proximal/mid ascending colon.   Redundant colon.      Epidural Steroid Injection      Previous injection 17 years ago with Dr. Pang, Pain Management, 1 with Dr. Samuels    ESOPHAGOGASTRODUODENOSCOPY  11/06/2008    GERD & Gastritis    EYE SURGERY      Focal Laser OU    HYSTERECTOMY      ovaries spared    Lumbar Steroid Injection      Pain management    SCS REMOVAL  3/16/16    [facility]    SPINAL FUSION  1999    L4-L5    SPINE SURGERY  3/16/16    L4-5 PEDICLE SCREW & RODS REMOVED/[facility]    TUBAL LIGATION      UPPER GASTROINTESTINAL ENDOSCOPY  12/2015    Dr. Rodriguez    VAGINAL DELIVERY      times 3       Family History   Problem Relation Age of Onset    COPD Mother     Heart disease Mother     Cancer Mother      Breast    Stroke Father     Heart failure Father     Diabetes Father     Diabetes Sister        Social History     Social History    Marital status:      Spouse name: N/A    Number of children: N/A    Years of education: N/A     Social History Main Topics    Smoking status: Never Smoker    Smokeless tobacco: Never Used    Alcohol use No    Drug use: No    Sexual activity: Not Currently     Other Topics Concern    None     Social History Narrative       No current facility-administered medications for this encounter.        Review of patient's allergies indicates:   Allergen Reactions    Sulfa (sulfonamide antibiotics)      Other reaction(s): Hives       Vitals:    04/24/17 1346 04/25/17 1411   BP:  135/75   Pulse:  64   Resp:  18   Temp:  98.1 °F (36.7 °C)   TempSrc:  Skin   SpO2:  100%   Weight: 88.5 kg (195 lb)    Height: 5' 7" (1.702 m)        REVIEW OF SYSTEMS:     GENERAL: No weight loss, malaise or fevers.  HEENT:  No recent changes in vision or hearing  NECK: " Negative for lumps, no difficulty with swallowing.  RESPIRATORY: Negative for cough, wheezing or shortness of breath, patient denies any recent URI.  CARDIOVASCULAR: Negative for chest pain, leg swelling or palpitations.  GI: Negative for abdominal discomfort, blood in stools or black stools or change in bowel habits.  MUSCULOSKELETAL: See HPI.  SKIN: Negative for lesions, rash, and itching.  PSYCH: No suicidal or homicidal ideations, no current mood disturbances.  HEMATOLOGY/LYMPHOLOGY: Negative for prolonged bleeding, bruising easily or swollen nodes. Patient is not currently taking any anti-coagulants  ENDO: No history of diabetes or thyroid dysfunction  NEURO: No history of syncope, paralysis, seizures or tremors.All other reviewed and negative other than HPI.    Physical exam:  Gen: A and O x3, pleasant, well-groomed  Skin: No rashes or obvious lesions  HEENT: PERRLA, no obvious deformities on ears or in canals. No thyroid masses, trachea midline, no palpable lymph nodes in neck, axilla.  CVS: Regular rate and rhythm, normal S1 and S2, no murmurs.  Resp: Clear to auscultation bilaterally.  Abdomen: Soft, NT/ND, normal bowel sounds present.  Musculoskeletal/Neuro: Moving all extremities    Assessment:  Lumbar radiculopathy  -     Case Request Operating Room: INJECTION-STEROID-EPIDURAL-CAUDAL  -     Activity as tolerated; Standing  -     Place in Outpatient; Standing  -     Diet NPO; Standing  -     alprazolam ODT dissolvable tablet 1 mg; Take 2 tablets (1 mg total) by mouth once as needed for Anxiety.  -     Notify physician ; Standing  -     Notify physician ; Standing  -     Notify physician (specify); Standing  -     Verify informed consent; Standing  -     Vital signs; Standing

## 2017-04-25 NOTE — DISCHARGE SUMMARY
[facility]  Discharge Note  Short Stay    Admit Date: 4/25/2017    Discharge Date: 4/25/2017    Attending Physician: Adan Samuels MD     Discharge Provider: Adan Samuels    Diagnoses:  Active Hospital Problems    Diagnosis  POA    *Lumbar radiculopathy [M54.16]  Yes      Resolved Hospital Problems    Diagnosis Date Resolved POA   No resolved problems to display.       Discharged Condition: good    Final Diagnoses: Lumbar radiculopathy [M54.16]    Disposition: Home or Self Care    Hospital Course: no complications, uneventful    Outcome of Hospitalization, Treatment, Procedure, or Surgery:  Patient was admitted for outpatient procedure. The patient underwent procedure without complications and are discharged home    Follow up/Patient Instructions:  Follow up as scheduled/Patient has received instructions and follow up date    Medications:  Continue previous medications      Discharge Procedure Orders  Diet general     Activity as tolerated     Call MD for:  temperature >100.4     Call MD for:  severe uncontrolled pain     Call MD for:  redness, tenderness, or signs of infection (pain, swelling, redness, odor or green/yellow discharge around incision site)     Call MD for:  severe persistent headache     No dressing needed           Discharge Procedure Orders (must include Diet, Follow-up, Activity):    Discharge Procedure Orders (must include Diet, Follow-up, Activity)  Diet general     Activity as tolerated     Call MD for:  temperature >100.4     Call MD for:  severe uncontrolled pain     Call MD for:  redness, tenderness, or signs of infection (pain, swelling, redness, odor or green/yellow discharge around incision site)     Call MD for:  severe persistent headache     No dressing needed

## 2017-04-25 NOTE — IP AVS SNAPSHOT
[facility]  [address]  [place] [address]  Phone: [phone]           Patient Discharge Instructions   Our goal is to set you up for success. This packet includes information on your condition, medications, and your home care.  It will help you care for yourself to prevent having to return to the hospital.     Please ask your nurse if you have any questions.      There are many details to remember when preparing to leave the hospital. Here is what you will need to do:    1. Take your medicine. If you are prescribed medications, review your Medication List on the following pages. You may have new medications to  at the pharmacy and others that you'll need to stop taking. Review the instructions for how and when to take your medications. Talk with your doctor or nurses if you are unsure of what to do.     2. Go to your follow-up appointments. Specific follow-up information is listed in the following pages. Your may be contacted by a nurse or clinical provider about future appointments. Be sure we have all of the phone numbers to reach you. Please contact your provider's office if you are unable to make an appointment.     3. Watch for warning signs. Your doctor or nurse will give you detailed warning signs to watch for and when to call for assistance. These instructions may also include educational information about your condition. If you experience any of warning signs to your health, call your doctor.           [facility]  Unless otherwise directed by your provider, please   contact [facility], our nurse care line   that is available for 24/7 assistance.     [phone] (toll-free)     Registered nurses in the [facility]   provide: appointment scheduling, clinical advisement, health education, and other advisory services.                  ** Verify the list of medication(s) below is accurate and up to date. Carry this with you in case of  emergency. If your medications have changed, please notify your healthcare provider.             Medication List      CONTINUE taking these medications        Additional Info                      aspirin 81 MG EC tablet   Commonly known as:  ECOTRIN   Refills:  0   Dose:  81 mg    Instructions:  Take 1 tablet (81 mg total) by mouth once daily.     Begin Date    AM    Noon    PM    Bedtime       azelastine 137 mcg (0.1 %) nasal spray   Commonly known as:  ASTELIN   Quantity:  30 mL   Refills:  5   Dose:  1 spray    Instructions:  1 spray (137 mcg total) by Nasal route 2 (two) times daily.     Begin Date    AM    Noon    PM    Bedtime       b complex vitamins tablet   Refills:  0   Dose:  1 tablet    Instructions:  Take 1 tablet by mouth every morning.     Begin Date    AM    Noon    PM    Bedtime       buPROPion 300 MG 24 hr tablet   Commonly known as:  WELLBUTRIN XL   Quantity:  90 tablet   Refills:  4   Dose:  300 mg   Comments:  90 days supply    Instructions:  Take 1 tablet (300 mg total) by mouth every morning.     Begin Date    AM    Noon    PM    Bedtime       docusate sodium 100 mg capsule   Commonly known as:  STOOL SOFTENER   Refills:  0   Dose:  100 mg    Instructions:  Take 100 mg by mouth 2 (two) times daily.     Begin Date    AM    Noon    PM    Bedtime       fluoxetine 40 MG capsule   Commonly known as:  PROZAC   Quantity:  90 capsule   Refills:  4   Dose:  40 mg   Comments:  90 days supply    Instructions:  Take 1 capsule (40 mg total) by mouth every morning.     Begin Date    AM    Noon    PM    Bedtime       fluticasone 50 mcg/actuation nasal spray   Commonly known as:  FLONASE   Quantity:  48 g   Refills:  5   Comments:  **Patient requests 90 days supply**    Instructions:  2 SPRS IN EACH NOSTRIL EVERY DAY     Begin Date    AM    Noon    PM    Bedtime       gabapentin 300 MG capsule   Commonly known as:  NEURONTIN   Quantity:  360 capsule   Refills:  3    Instructions:  TAKE 2 CAPSULES BY MOUTH  TWICE DAILY     Begin Date    AM    Noon    PM    Bedtime       levothyroxine 100 MCG tablet   Commonly known as:  SYNTHROID   Quantity:  90 tablet   Refills:  1    Instructions:  TAKE 1 TABLET BY MOUTH EVERY DAY     Begin Date    AM    Noon    PM    Bedtime       nystatin cream   Commonly known as:  MYCOSTATIN   Quantity:  30 g   Refills:  2    Instructions:  Apply topically 2 (two) times daily.     Begin Date    AM    Noon    PM    Bedtime       pantoprazole 40 MG tablet   Commonly known as:  PROTONIX   Quantity:  90 tablet   Refills:  9   Comments:  **Patient requests 90 days supply**    Instructions:  TAKE 1 TABLET(40 MG) BY MOUTH EVERY DAY     Begin Date    AM    Noon    PM    Bedtime       PROBIOTIC PEARLS ORAL   Refills:  0    Instructions:  Take by mouth.     Begin Date    AM    Noon    PM    Bedtime       ranitidine 300 MG tablet   Commonly known as:  ZANTAC   Quantity:  90 tablet   Refills:  3    Instructions:  TAKE 1 TABLET BY MOUTH EVERY EVENING ABOUT 30 TO 60 MINUTES BEFORE BEDTIME     Begin Date    AM    Noon    PM    Bedtime       trazodone 100 MG tablet   Commonly known as:  DESYREL   Quantity:  180 tablet   Refills:  4   Comments:  90 days supply    Instructions:  TAKE 1 TO 2 TABLETS BY MOUTH NIGHTLY FOR SLEEP     Begin Date    AM    Noon    PM    Bedtime       VITAMIN C 1000 MG tablet   Refills:  0   Dose:  1000 mg   Generic drug:  ascorbic acid (vitamin C)    Instructions:  Take 1,000 mg by mouth every morning.     Begin Date    AM    Noon    PM    Bedtime       VITAMIN D3 1,000 unit capsule   Refills:  0   Dose:  2000 Units   Generic drug:  cholecalciferol (vitamin D3)    Instructions:  Take 2,000 Units by mouth every morning.     Begin Date    AM    Noon    PM    Bedtime                  Please bring to all follow up appointments:    1. A copy of your discharge instructions.  2. All medicines you are currently taking in their original bottles.  3. Identification and insurance card.    Please  arrive 15 minutes ahead of scheduled appointment time.    Please call 24 hours in advance if you must reschedule your appointment and/or time.        Your Scheduled Appointments     May 22, 2017  2:00 PM CDT   Established Patient Visit with Christiano Xavier MD   [facility] ([facility])    [address] [address]   [phone]            May 23, 2017  1:30 PM CDT   Established Patient Visit with RUMA Serra - [facility] Management ([facility] [place])    [address] [facility] [address] [place] [address]   [phone]                Discharge Instructions     Future Orders    Activity as tolerated     Call MD for:  redness, tenderness, or signs of infection (pain, swelling, redness, odor or green/yellow discharge around incision site)     Call MD for:  severe persistent headache     Call MD for:  severe uncontrolled pain     Call MD for:  temperature >100.4     Diet general     Questions:    Total calories:      Fat restriction, if any:      Protein restriction, if any:      Na restriction, if any:      Fluid restriction:      Additional restrictions:      No dressing needed         Discharge Instructions       Home care instructions  Apply ice pack to the injection site for 20 minutes periods for the first 24 hrs for soreness/discomfort at injection site DO NOT USE HEAT FOR 24 HOURS  Keep site clean and dry for 24 hours, remove bandaid when desired  Do not drive until tomorrow  Take care when walking after a lumbar injection  Avoid strenuous activities for 2 days  Make take 2 weeks to feel the full effects   Resume home medication as prescribed today  Resume Aspirin, Plavix, or Coumadin the day after the procedure unless otherwise instructed.    SEE IMMEDIATE MEDICAL HELP FOR:  Severe increase in your usual pain or appearance of new pain  Prolonged or increasing weakness or numbness in the legs or arms  Drainage, redness, active bleeding, or  "increased swelling at the injection site  Temperature over 100.0 degrees F.  Headache that increases when your head is upright and decreases when you lie flat    CALL 911 OR GO DIRECTLY TO EMERGENCY DEPARTMENT FOR:  Shortness of breath, chest pain, or problems breathing      Primary Diagnosis     Your primary diagnosis was:  Lumbar Nerve Root Disorder      Admission Information     Date & Time Provider Department CSN    4/25/2017  1:36 PM Adan Samuels MD [facility] [id number]      Care Providers     Provider Role Specialty Primary office phone    Adan Samuels MD Attending Provider Pain Medicine [phone]    Adan Samuels MD Surgeon  Pain Medicine [phone]      Your Vitals Were     BP Pulse Temp Resp Height Weight    121/77 (BP Location: Right arm, Patient Position: Sitting, BP Method: Automatic) 65 97.9 °F (36.6 °C) 16 5' 7" (1.702 m) 88.5 kg (195 lb)    SpO2 BMI             97% 30.54 kg/m2         Recent Lab Values        2/17/2017                           4:36 AM           A1C 5.0           Comment for A1C at  4:36 AM on 2/17/2017:  Reference Interval:  5.0 - 5.6 Normal   5.7 - 6.4 High Risk   > 6.5 Diabetic     Hgb A1c results are standardized based on the (NGSP) National   Glycohemoglobin Standardization Program.    Hemoglobin A1C levels are related to mean serum/plasma glucose   during the preceding 2-3 months.            Allergies as of 4/25/2017        Reactions    Sulfa (Sulfonamide Antibiotics) Hives    Other reaction(s): Hives      Advance Directives     An advance directive is a document which, in the event you are no longer able to make decisions for yourself, tells your healthcare team what kind of treatment you do or do not want to receive, or who you would like to make those decisions for you.  If you do not currently have an advance directive, [facility] encourages you to create one.  For more information call:  [phone] ([phone]), [phone] " ([phone]),  or log on to [email or web address].        Language Assistance Services     ATTENTION: Language assistance services are available, free of charge. Please call [phone].      ATENCIÓN: Si habla shadiañol, tiene a iverson disposición servicios gratuitos de asistencia lingüística. Llame al [phone].     CHÚ Ý: N?u b?n nói Ti?ng Vi?t, có các d?ch v? h? tr? ngôn ng? mi?n phí dành cho b?n. G?i s? [phone].         [facility] complies with applicable Federal civil rights laws and does not discriminate on the basis of race, color, national origin, age, disability, or sex.

## 2017-04-26 VITALS
HEIGHT: 67 IN | TEMPERATURE: 98 F | OXYGEN SATURATION: 97 % | HEART RATE: 65 BPM | BODY MASS INDEX: 30.61 KG/M2 | DIASTOLIC BLOOD PRESSURE: 77 MMHG | RESPIRATION RATE: 16 BRPM | SYSTOLIC BLOOD PRESSURE: 121 MMHG | WEIGHT: 195 LBS

## 2017-05-11 RX ORDER — TRAZODONE HYDROCHLORIDE 100 MG/1
TABLET ORAL
Qty: 180 TABLET | Refills: 0 | OUTPATIENT
Start: 2017-05-11

## 2017-06-01 ENCOUNTER — OFFICE VISIT (OUTPATIENT)
Dept: PAIN MEDICINE | Facility: CLINIC | Age: 67
End: 2017-06-01
Payer: MEDICARE

## 2017-06-01 VITALS
SYSTOLIC BLOOD PRESSURE: 130 MMHG | RESPIRATION RATE: 18 BRPM | TEMPERATURE: 99 F | BODY MASS INDEX: 30.87 KG/M2 | WEIGHT: 197.06 LBS | DIASTOLIC BLOOD PRESSURE: 80 MMHG | HEART RATE: 78 BPM

## 2017-06-01 DIAGNOSIS — M47.816 FACET HYPERTROPHY OF LUMBAR REGION: Primary | ICD-10-CM

## 2017-06-01 DIAGNOSIS — M48.061 LUMBAR STENOSIS: ICD-10-CM

## 2017-06-01 DIAGNOSIS — M51.36 DDD (DEGENERATIVE DISC DISEASE), LUMBAR: ICD-10-CM

## 2017-06-01 PROCEDURE — 99214 OFFICE O/P EST MOD 30 MIN: CPT | Mod: PBBFAC,PO | Performed by: PHYSICIAN ASSISTANT

## 2017-06-01 PROCEDURE — 99499 UNLISTED E&M SERVICE: CPT | Mod: S$PBB,,, | Performed by: PHYSICIAN ASSISTANT

## 2017-06-01 PROCEDURE — 99213 OFFICE O/P EST LOW 20 MIN: CPT | Mod: S$PBB,,, | Performed by: PHYSICIAN ASSISTANT

## 2017-06-01 PROCEDURE — 99999 PR PBB SHADOW E&M-EST. PATIENT-LVL IV: CPT | Mod: PBBFAC,,, | Performed by: PHYSICIAN ASSISTANT

## 2017-06-01 RX ORDER — SODIUM CHLORIDE, SODIUM LACTATE, POTASSIUM CHLORIDE, CALCIUM CHLORIDE 600; 310; 30; 20 MG/100ML; MG/100ML; MG/100ML; MG/100ML
INJECTION, SOLUTION INTRAVENOUS CONTINUOUS
Status: CANCELLED | OUTPATIENT
Start: 2017-07-14

## 2017-06-01 RX ORDER — MIDAZOLAM HYDROCHLORIDE 5 MG/ML
4 INJECTION INTRAMUSCULAR; INTRAVENOUS ONCE
Status: CANCELLED | OUTPATIENT
Start: 2017-07-14

## 2017-06-01 NOTE — PROGRESS NOTES
This note was completed with dictation software and grammatical errors may exist.    CC: back pain    HPI: The patient is a 67 year-old Female with a history of colon CA, sleep apnea, anemia, depression and postlaminectomy syndrome now with myelodysplastic syndrome who presents in referral from Dr. Villanueva.  She is status post caudal epidural steroid injection on 4/25/17 with 0% relief.  She complains of right greater than left low back pain with occasional radiation to her right greater than left lateral thighs.  Her low back pain is worse in the mornings and she states that it wakes her up just about every morning.  Otherwise, the pain is random and she has to change positions often throughout the day.  She denies having any weakness or numbness.  She continues to have bladder incontinence but denies bowel incontinence.    Pain intervention history:  She is status post caudal epidural steroid injection on 5/7/14 with 90% relief lasting 3 days, then the pain slowly came back over 7 days.  She is status post caudal epidural steroid injection on 6/17/14 with 70% relief.  She is status post caudal epidural steroid injection on 9/10/15 with almost complete relief lasting 5-6 days.  She is status post left L3 and L5 transforaminal epidural steroid injections on 10/14/15 with 0% relief.  She is status post left L4 transforaminal epidural steroid injection on 1/13/16 with about 90% relief lasting one week.  She underwent removal spinal cord stimulator and L4/5 hardware on 3/16/16 with Dr. Aldrich.  She is status post caudal epidural steroid injection on 4/25/17 with 0% relief.    ROS:She reports fatigue, joint pain.  Balance of review of systems is negative.    Medical, surgical, family and social history reviewed elsewhere in record.    Medications/Allergies: See med card    Vitals:    06/01/17 1322   BP: 130/80   Pulse: 78   Resp: 18   Temp: 99.3 °F (37.4 °C)   TempSrc: Oral   Weight: 89.4 kg (197 lb 1.5 oz)    PainSc:   4   PainLoc: Back         Physical exam:  Gen: A and O x3, pleasant, well-groomed  Skin: No rashes or obvious lesions  HEENT: PERRLA  CVS: Regular rate and rhythm, normal S1 and S2, no murmurs.  Resp: Clear to auscultation bilaterally, no wheezes or rales.  Abdomen: Soft, NT/ND, normal bowel sounds present.  Musculoskeletal: Nonantalgic gait.    Neuro:  Lower extremities: 5/5 strength bilaterally  Reflexes: Patellar 0+, Achilles 2+ bilaterally.  Sensory:  Intact and symmetrical to light touch and pinprick in L2-S1 dermatomes bilaterally.    Lumbar spine:  Lumbar spine: Range of motion is mildly limited with flexion and moderately limited with extension with increased pain in the right greater than left low back and right upper gluteal region during each maneuver but especially with extension and right oblique extension.  Luca's test is negative bilaterally.  Supine straight leg raise causes hamstring tightness only.  Internal and external rotation of the hip causes no increased pain on either side.  Myofascial exam: Exquisite tenderness to palpation to the right lower lumbar paraspinous muscles.      Imaging:  MRI LUMBAR SPINE W/WO CONTRAST ON 8/22/06  1. MINIMAL L3-4 DISC BULGE CAUSING MINIMAL THECAL SAC COMPRESSION, AND THERE IS ALSO MILD BILATERAL DEGENERATIVE FACET ARTHROSIS.   2. L4-5 DISCECTOMY AND L5 LAMINECTOMY WITH NO EVIDENCE OF A SIGNIFICANT SPINAL CANAL COMPROMISE. THERE IS MILD BILATERAL FORAMINAL NARROWING AT THE L4-5 LEVEL   3. DEGENERATION AND MILD BULGING OF THE L5-S1 DISC WITH ASSOCIATED MILD THECAL SAC COMPRESSION.   4. ROUNDED MASS ARISING FROM THE LEFT KIDNEY WHICH MOST LIKELY REPRESENTS A CYST. ULTRASOUND IS RECOMMENDED TO EXCLUDE A RENAL NEOPLASM.    2/11/14 x-ray tib-fib:  No obvious right tibial or fibular fracture is noted. No worrisome abnormality is noted. A linear metallic density is noted on the lateral view overlying the forefoot dorsally near the navicular cuneiform  articulation. This could relate to a foreign body or overlying density relating to clothing. Please clinically correlate a small quadriceps patellar spur is noted.    10/1/15 CT lumbar spine  T12/L1: Mild disk bulging is evident and degenerative facet changes are noted at this level. The canal and foramina are grossly intact.  L1/L2: Mild annular disk bulge and mild degenerative facet changes produce only minimal canal and foraminal distortion to  L2/L3: Degenerative facet changes noted in the mild annular disk and facet disease.  L3/L4: Posterior canal ligamentous hypertrophy and degenerative facet changes are present. There is moderate annular disk bulging. This combines to produce mild canal and left greater than right foraminal narrowing. This is slightly worsened from the previous exam.  L4/L5: The canal is grossly intact as the surgical level. Degenerative facet changes are noted bilaterally.  L5/S1: Moderate degenerative facet changes noted bilaterally and there is suggestion of disk bulging with left greater than right foraminal narrowing.    11/16/16 MRI lumbar spine with and without contrast  There have been prior laminectomies at L4 and L5, with interbody spacer present at L4-5.  Vertebral body height and alignment are within normal limits.  Marrow signal is heterogeneous.  There is moderate marrow enhancement within the L4 and L5 vertebral bodies centered about the disc space which is likely reactive.  The conus terminates at T12-L1.  Disc desiccation is present throughout the lumbar spine.  Mild loss of disc height is present at the L5-S1 level.  L1-L2:  No disc herniation. No spinal canal or neuroforaminal narrowing.  L2-L3:  No disc herniation. No spinal canal or neuroforaminal narrowing.  L3-L4:  There is mild diffuse disc bulging and moderate facet arthropathy with significant thickening of the ligament of flavum.  The findings contribute to mild spinal canal narrowing without significant  neuroforaminal narrowing.  L4-L5:  Moderate bilateral neuroforaminal narrowing is present which appears predominantly due to facet arthropathy, with some contribution from enhancing scar tissue formation within the neuroforamen on the right.  No spinal canal narrowing.  L5-S1:  There is mild broad-based posterior bulging of the disc, abdomen and moderate bilateral facet arthropathy.  There is resultant mild bilateral neuroforaminal narrowing.      Assessment:  The patient is a 67year-old Female with a history of colon CA, sleep apnea, anemia, depression and postlaminectomy syndrome now with myelodysplastic syndrome who presents in referral from Dr. Villanueva.  1. Facet hypertrophy of lumbar region     2. Lumbar stenosis     3. DDD (degenerative disc disease), lumbar         Plan:  1.  She denies having any relief following the caudal NIC.  I believe her pain is likely facet mediated.  She denies any mid or upper lumbar region pain and I believe her pain is due to the L5/S1 facet joints below her L4/5 fusion.  I will schedule her for bilateral L5/S1 facet joint injections.  If she has relief but not long-lasting, she may benefit from radiofrequency ablation of the bilateral L4 and L5 medial branch nerves.  2.  Follow-up in 4 weeks post procedure or sooner as needed.

## 2017-06-22 DIAGNOSIS — K21.9 GASTROESOPHAGEAL REFLUX DISEASE, ESOPHAGITIS PRESENCE NOT SPECIFIED: ICD-10-CM

## 2017-06-22 RX ORDER — PANTOPRAZOLE SODIUM 40 MG/1
TABLET, DELAYED RELEASE ORAL
Qty: 90 TABLET | Refills: 3 | Status: SHIPPED | OUTPATIENT
Start: 2017-06-22 | End: 2017-10-06 | Stop reason: SDUPTHER

## 2017-07-11 RX ORDER — VITAMIN E 268 MG
400 CAPSULE ORAL DAILY
COMMUNITY
End: 2018-06-04

## 2017-07-14 ENCOUNTER — HOSPITAL ENCOUNTER (OUTPATIENT)
Facility: HOSPITAL | Age: 67
Discharge: HOME | End: 2017-07-14
Attending: ANESTHESIOLOGY | Admitting: ANESTHESIOLOGY
Payer: MEDICARE

## 2017-07-14 ENCOUNTER — HOSPITAL ENCOUNTER (OUTPATIENT)
Dept: RADIOLOGY | Facility: HOSPITAL | Age: 67
Discharge: HOME | End: 2017-07-14
Attending: ANESTHESIOLOGY
Payer: MEDICARE

## 2017-07-14 ENCOUNTER — SURGERY (OUTPATIENT)
Age: 67
End: 2017-07-14

## 2017-07-14 VITALS
WEIGHT: 195 LBS | SYSTOLIC BLOOD PRESSURE: 140 MMHG | BODY MASS INDEX: 30.61 KG/M2 | RESPIRATION RATE: 16 BRPM | OXYGEN SATURATION: 96 % | TEMPERATURE: 98 F | HEIGHT: 67 IN | HEART RATE: 61 BPM | DIASTOLIC BLOOD PRESSURE: 69 MMHG

## 2017-07-14 DIAGNOSIS — M47.816 FACET HYPERTROPHY OF LUMBAR REGION: Primary | ICD-10-CM

## 2017-07-14 DIAGNOSIS — M51.36 DDD (DEGENERATIVE DISC DISEASE), LUMBAR: ICD-10-CM

## 2017-07-14 PROCEDURE — 63600175 PHARM REV CODE 636 W HCPCS: Mod: PO | Performed by: ANESTHESIOLOGY

## 2017-07-14 PROCEDURE — 71000033 HC RECOVERY, INTIAL HOUR: Mod: PO | Performed by: ANESTHESIOLOGY

## 2017-07-14 PROCEDURE — 76000 FLUOROSCOPY <1 HR PHYS/QHP: CPT | Mod: TC,PO

## 2017-07-14 PROCEDURE — 64493 INJ PARAVERT F JNT L/S 1 LEV: CPT | Mod: 50,,, | Performed by: ANESTHESIOLOGY

## 2017-07-14 PROCEDURE — 25000003 PHARM REV CODE 250: Mod: PO | Performed by: ANESTHESIOLOGY

## 2017-07-14 PROCEDURE — 25500020 PHARM REV CODE 255: Mod: PO | Performed by: ANESTHESIOLOGY

## 2017-07-14 PROCEDURE — 99152 MOD SED SAME PHYS/QHP 5/>YRS: CPT | Mod: ,,, | Performed by: ANESTHESIOLOGY

## 2017-07-14 PROCEDURE — 64493 INJ PARAVERT F JNT L/S 1 LEV: CPT | Mod: 50,PO | Performed by: ANESTHESIOLOGY

## 2017-07-14 RX ORDER — SODIUM CHLORIDE, SODIUM LACTATE, POTASSIUM CHLORIDE, CALCIUM CHLORIDE 600; 310; 30; 20 MG/100ML; MG/100ML; MG/100ML; MG/100ML
INJECTION, SOLUTION INTRAVENOUS CONTINUOUS
Status: DISCONTINUED | OUTPATIENT
Start: 2017-07-14 | End: 2017-07-14 | Stop reason: HOSPADM

## 2017-07-14 RX ORDER — BUPIVACAINE HYDROCHLORIDE 2.5 MG/ML
INJECTION, SOLUTION EPIDURAL; INFILTRATION; INTRACAUDAL
Status: DISCONTINUED | OUTPATIENT
Start: 2017-07-14 | End: 2017-07-14 | Stop reason: HOSPADM

## 2017-07-14 RX ORDER — MIDAZOLAM HYDROCHLORIDE 5 MG/ML
4 INJECTION INTRAMUSCULAR; INTRAVENOUS ONCE
Status: COMPLETED | OUTPATIENT
Start: 2017-07-14 | End: 2017-07-14

## 2017-07-14 RX ORDER — METHYLPREDNISOLONE ACETATE 80 MG/ML
INJECTION, SUSPENSION INTRA-ARTICULAR; INTRALESIONAL; INTRAMUSCULAR; SOFT TISSUE
Status: DISCONTINUED | OUTPATIENT
Start: 2017-07-14 | End: 2017-07-14 | Stop reason: HOSPADM

## 2017-07-14 RX ORDER — LIDOCAINE HYDROCHLORIDE 10 MG/ML
INJECTION, SOLUTION EPIDURAL; INFILTRATION; INTRACAUDAL; PERINEURAL
Status: DISCONTINUED | OUTPATIENT
Start: 2017-07-14 | End: 2017-07-14 | Stop reason: HOSPADM

## 2017-07-14 RX ADMIN — IOHEXOL 3 ML: 300 INJECTION, SOLUTION INTRAVENOUS at 04:07

## 2017-07-14 RX ADMIN — BUPIVACAINE HYDROCHLORIDE 3 ML: 2.5 INJECTION, SOLUTION EPIDURAL; INFILTRATION; INTRACAUDAL; PERINEURAL at 04:07

## 2017-07-14 RX ADMIN — METHYLPREDNISOLONE ACETATE 80 MG: 80 INJECTION, SUSPENSION INTRA-ARTICULAR; INTRALESIONAL; INTRAMUSCULAR; SOFT TISSUE at 04:07

## 2017-07-14 RX ADMIN — LIDOCAINE HYDROCHLORIDE 5 ML: 10 INJECTION, SOLUTION EPIDURAL; INFILTRATION; INTRACAUDAL; PERINEURAL at 04:07

## 2017-07-14 RX ADMIN — MIDAZOLAM HYDROCHLORIDE 4 MG: 5 INJECTION, SOLUTION INTRAMUSCULAR; INTRAVENOUS at 04:07

## 2017-07-14 RX ADMIN — SODIUM CHLORIDE, SODIUM LACTATE, POTASSIUM CHLORIDE, AND CALCIUM CHLORIDE: .6; .31; .03; .02 INJECTION, SOLUTION INTRAVENOUS at 03:07

## 2017-07-14 NOTE — H&P
CC: Back pain    HPI: The patient is a 68yo woman with a history of lumbar facet arthropathy here for bilateral L5/S1 facet joint injections. There are no major changes in history and physical from 6/1/17.    Past Medical History:   Diagnosis Date    *Atrial fibrillation     History of Cardiac Ablation    Allergy     Anticoagulant long-term use     ASA 81 mg    Anxiety     Atherosclerotic heart disease of native coronary artery with angina pectoris     2/17 LHC Mild, moderate diagonal disease. Small distal LAD. Mild diastolic dysfunction.    Cataract     OU    Chronic pain     Leg, lower back    Colon cancer     Colon polyp     DDD (degenerative disc disease), lumbar     h/o sciatica    Depression     Diverticulosis     Encounter for blood transfusion     Esophageal stenosis     Fibromyalgia     Gastritis     GERD (gastroesophageal reflux disease)     hiatal as well    Hiatal hernia     History of psychiatric hospitalization     1997 at [place]    History of renal calculi     Hypersomnia with sleep apnea     uses C-pap    Hypothyroidism 1/28/2013    Hashimoto's, with Hypothyroidism    Irritable bowel syndrome     Macular degeneration     lattice-OU    Meningitis     age 3    Myelodysplastic syndrome     bone marrow cancer    CORWIN (obstructive sleep apnea)     Personal history of colonic polyps     PVD (posterior vitreous detachment)     OU    Retinal detachment     HST-OS    Rheumatic fever     as a child    Supraventricular tachycardia        Past Surgical History:   Procedure Laterality Date    APPENDECTOMY      ATRIAL ABLATION SURGERY      BACK SURGERY      Right Spinal Cord Stimulator    BONE MARROW BIOPSY      myelodysplastic syndrome    CHOLECYSTECTOMY  11/26/2001    Laparascopic    COLON SURGERY  ~2011  ([facility]?  [facility])    Colon Resection (sigmoid?)    COLONOSCOPY  6/2015    Dr. orlando, repeat in 3 years    COLONOSCOPY W/ BIOPSIES AND POLYPECTOMY  5/05/2010  Ashish  "   COLONOSCOPY W/ POLYPECTOMY  6/19/2015  Michael    Four 1 to 4 mm polyps at the hepatic flexure and distal ascending colon.  -Tubulovillous adenoma.   Diverticulosis in the sigmoid colon.  Patent end-to-end ileo-colonic anastomosis in the proximal/mid ascending colon.   Redundant colon.      Epidural Steroid Injection      Previous injection 17 years ago with Dr. Pang, Pain Management, 1 with Dr. Samuels    ESOPHAGOGASTRODUODENOSCOPY  11/06/2008    GERD & Gastritis    EYE SURGERY      Focal Laser OU    HYSTERECTOMY      ovaries spared    Lumbar Steroid Injection      Pain management    SCS REMOVAL  3/16/16    [facility]    SPINAL FUSION  1999    L4-L5    SPINE SURGERY  3/16/16    L4-5 PEDICLE SCREW & RODS REMOVED/[facility]    TUBAL LIGATION      UPPER GASTROINTESTINAL ENDOSCOPY  12/2015    Dr. Rodriguez    VAGINAL DELIVERY      times 3       Family History   Problem Relation Age of Onset    COPD Mother     Heart disease Mother     Cancer Mother      Breast    Stroke Father     Heart failure Father     Diabetes Father     Diabetes Sister        Social History     Social History    Marital status:      Spouse name: N/A    Number of children: N/A    Years of education: N/A     Social History Main Topics    Smoking status: Never Smoker    Smokeless tobacco: Never Used    Alcohol use No    Drug use: No    Sexual activity: Not Currently     Other Topics Concern    None     Social History Narrative    None       No current facility-administered medications for this encounter.        Review of patient's allergies indicates:   Allergen Reactions    Sulfa (sulfonamide antibiotics) Hives     Other reaction(s): Hives       Vitals:    07/14/17 1457   BP: 131/77   Pulse: 60   Resp: 17   Temp: 97.7 °F (36.5 °C)   TempSrc: Skin   SpO2: 97%   Weight: 88.5 kg (195 lb)   Height: 5' 7" (1.702 m)       REVIEW OF SYSTEMS:     GENERAL: No weight loss, malaise or fevers.  HEENT:  No recent changes in vision " or hearing  NECK: Negative for lumps, no difficulty with swallowing.  RESPIRATORY: Negative for cough, wheezing or shortness of breath, patient denies any recent URI.  CARDIOVASCULAR: Negative for chest pain, leg swelling or palpitations.  GI: Negative for abdominal discomfort, blood in stools or black stools or change in bowel habits.  MUSCULOSKELETAL: See HPI.  SKIN: Negative for lesions, rash, and itching.  PSYCH: No suicidal or homicidal ideations, no current mood disturbances.  HEMATOLOGY/LYMPHOLOGY: Negative for prolonged bleeding, bruising easily or swollen nodes. Patient is not currently taking any anti-coagulants  ENDO: No history of diabetes or thyroid dysfunction  NEURO: No history of syncope, paralysis, seizures or tremors.All other reviewed and negative other than HPI.    Physical exam:  Gen: A and O x3, pleasant, well-groomed  Skin: No rashes or obvious lesions  HEENT: PERRLA, no obvious deformities on ears or in canals. No thyroid masses, trachea midline, no palpable lymph nodes in neck, axilla.  CVS: Regular rate and rhythm, normal S1 and S2, no murmurs.  Resp: Clear to auscultation bilaterally.  Abdomen: Soft, NT/ND, normal bowel sounds present.  Musculoskeletal/Neuro: Moving all extremities    Assessment:  Facet hypertrophy of lumbar region  -     Case Request Operating Room: INJECTION-FACET L5/S1  -     Activity as tolerated; Standing  -     Place in Outpatient; Standing  -     Diet NPO; Standing  -     lactated ringers infusion; Inject into the vein continuous.  -     midazolam (PF) injection 4 mg; Inject 0.8 mLs (4 mg total) into the vein once.  -     Notify physician ; Standing  -     Notify physician ; Standing  -     Notify physician (specify); Standing  -     Place 18-22 gaua peripheral IV ; Standing  -     Verify informed consent; Standing  -     Vital signs; Standing

## 2017-07-14 NOTE — OP NOTE
PROCEDURE DATE: 7/14/2017    PROCEDURE: Bilateral L5/S1 facet joint injection under fluoroscopy.    Diagnosis: Lumbar facet arthropathy    PHYSICIAN: Adan Samuels MD    MEDICATIONS INJECTED:  From a mixture of 3 ml of 0.25% bupivicaine and 80mg of methylprednisone, 0.9ml of solution was injected into each facet joint.    LOCAL ANESTHETIC USED:   Lidocaine 1%, 4 ml per level.    SEDATION MEDICATIONS: 4mg versed    ESTIMATED BLOOD LOSS:  none    COMPLICATIONS: none    TECHNIQUE:   A time-out was taken to identify the patient and procedure side prior to starting the procedure.  Lying in a prone position, the patient was prepped and draped in the usual sterile fashion using ChloraPrep and fenestrated drape.  The levels were determined under fluoroscopic guidance in the AP view and then rotated into the oblique view to visualize the joint space.  Local anesthetic was given by raising a wheal at the skin and then anesthetizing a tract using a 25-gauge, 1.5inch needle.  A 3.5 in 22-gauge needle was introduced into the right and then left L5/S1 facet joints.  Negative pressure applied to make sure that there was no intravascular placement.  Omnipaque contrast was injected to confirm placement and to confirm arthrogram and no vascular uptake.  Medication was then injected slowly.  The patient tolerated the procedure well.    The patient was monitored after the procedure.  Patient was given post procedure and discharge instructions to follow at home. The patient was discharged in a stable condition

## 2017-07-14 NOTE — DISCHARGE SUMMARY
[facility]  Discharge Note  Short Stay    Admit Date: 7/14/2017    Discharge Date: 7/14/2017    Attending Physician: Adan Samuels MD     Discharge Provider: Adan Samuels    Diagnoses:  Active Hospital Problems    Diagnosis  POA    *Facet hypertrophy of lumbar region [M47.896]  Yes      Resolved Hospital Problems    Diagnosis Date Resolved POA   No resolved problems to display.       Discharged Condition: good    Final Diagnoses: Facet hypertrophy of lumbar region [M47.896]    Disposition: Home or Self Care    Hospital Course: no complications, uneventful    Outcome of Hospitalization, Treatment, Procedure, or Surgery:  Patient was admitted for outpatient procedure. The patient underwent procedure without complications and are discharged home    Follow up/Patient Instructions:  Follow up as scheduled/Patient has received instructions and follow up date    Medications:  Continue previous medications      Discharge Procedure Orders  Diet general     Activity as tolerated     Call MD for:  temperature >100.4     Call MD for:  severe uncontrolled pain     Call MD for:  redness, tenderness, or signs of infection (pain, swelling, redness, odor or green/yellow discharge around incision site)     Call MD for:  severe persistent headache     No dressing needed           Discharge Procedure Orders (must include Diet, Follow-up, Activity):    Discharge Procedure Orders (must include Diet, Follow-up, Activity)  Diet general     Activity as tolerated     Call MD for:  temperature >100.4     Call MD for:  severe uncontrolled pain     Call MD for:  redness, tenderness, or signs of infection (pain, swelling, redness, odor or green/yellow discharge around incision site)     Call MD for:  severe persistent headache     No dressing needed

## 2017-07-15 RX ORDER — BUPROPION HYDROCHLORIDE 300 MG/1
TABLET ORAL
Qty: 90 TABLET | Refills: 0 | OUTPATIENT
Start: 2017-07-15

## 2017-07-17 ENCOUNTER — TELEPHONE (OUTPATIENT)
Dept: CARDIOLOGY | Facility: CLINIC | Age: 67
End: 2017-07-17

## 2017-07-17 NOTE — TELEPHONE ENCOUNTER
----- Message from Eusebia Sutherland sent at 7/17/2017 12:04 PM CDT -----  Contact: self  Patient [phone] is calling to schedule an appt with Dr Alcantar for as soon as possible as she has been having dizzy spells that are becoming more and more frequent/I do not show any available appts until toward the end of September 2017/please advise

## 2017-07-19 RX ORDER — FLUOXETINE HYDROCHLORIDE 40 MG/1
CAPSULE ORAL
Qty: 90 CAPSULE | Refills: 0 | OUTPATIENT
Start: 2017-07-19

## 2017-07-20 ENCOUNTER — PATIENT MESSAGE (OUTPATIENT)
Dept: PSYCHIATRY | Facility: CLINIC | Age: 67
End: 2017-07-20

## 2017-07-22 RX ORDER — BUPROPION HYDROCHLORIDE 300 MG/1
TABLET ORAL
Qty: 90 TABLET | Refills: 0 | Status: SHIPPED | OUTPATIENT
Start: 2017-07-22 | End: 2017-10-02 | Stop reason: SDUPTHER

## 2017-07-22 RX ORDER — FLUOXETINE HYDROCHLORIDE 40 MG/1
CAPSULE ORAL
Qty: 90 CAPSULE | Refills: 0 | Status: SHIPPED | OUTPATIENT
Start: 2017-07-22 | End: 2017-10-02 | Stop reason: SDUPTHER

## 2017-07-24 RX ORDER — TRAZODONE HYDROCHLORIDE 100 MG/1
TABLET ORAL
Qty: 180 TABLET | Refills: 0 | Status: SHIPPED | OUTPATIENT
Start: 2017-07-24 | End: 2017-10-02 | Stop reason: SDUPTHER

## 2017-08-11 ENCOUNTER — TELEPHONE (OUTPATIENT)
Dept: PAIN MEDICINE | Facility: CLINIC | Age: 67
End: 2017-08-11

## 2017-08-11 ENCOUNTER — OFFICE VISIT (OUTPATIENT)
Dept: PAIN MEDICINE | Facility: CLINIC | Age: 67
End: 2017-08-11
Payer: MEDICARE

## 2017-08-11 VITALS
SYSTOLIC BLOOD PRESSURE: 130 MMHG | RESPIRATION RATE: 18 BRPM | DIASTOLIC BLOOD PRESSURE: 70 MMHG | HEART RATE: 72 BPM | BODY MASS INDEX: 31.39 KG/M2 | TEMPERATURE: 99 F | WEIGHT: 200.38 LBS

## 2017-08-11 DIAGNOSIS — G89.4 CHRONIC PAIN ASSOCIATED WITH SIGNIFICANT PSYCHOSOCIAL DYSFUNCTION: ICD-10-CM

## 2017-08-11 DIAGNOSIS — M54.16 LUMBAR RADICULOPATHY: Primary | ICD-10-CM

## 2017-08-11 DIAGNOSIS — M54.16 LUMBAR RADICULITIS: ICD-10-CM

## 2017-08-11 PROCEDURE — 1125F AMNT PAIN NOTED PAIN PRSNT: CPT | Mod: ,,, | Performed by: PHYSICIAN ASSISTANT

## 2017-08-11 PROCEDURE — 99999 PR PBB SHADOW E&M-EST. PATIENT-LVL V: CPT | Mod: PBBFAC,,, | Performed by: PHYSICIAN ASSISTANT

## 2017-08-11 PROCEDURE — 3078F DIAST BP <80 MM HG: CPT | Mod: ,,, | Performed by: PHYSICIAN ASSISTANT

## 2017-08-11 PROCEDURE — 3075F SYST BP GE 130 - 139MM HG: CPT | Mod: ,,, | Performed by: PHYSICIAN ASSISTANT

## 2017-08-11 PROCEDURE — 99213 OFFICE O/P EST LOW 20 MIN: CPT | Mod: S$PBB,,, | Performed by: PHYSICIAN ASSISTANT

## 2017-08-11 PROCEDURE — 99499 UNLISTED E&M SERVICE: CPT | Mod: S$PBB,,, | Performed by: PHYSICIAN ASSISTANT

## 2017-08-11 PROCEDURE — 99215 OFFICE O/P EST HI 40 MIN: CPT | Mod: PBBFAC,PO | Performed by: PHYSICIAN ASSISTANT

## 2017-08-11 PROCEDURE — 1159F MED LIST DOCD IN RCRD: CPT | Mod: ,,, | Performed by: PHYSICIAN ASSISTANT

## 2017-08-11 RX ORDER — CEFAZOLIN SODIUM 2 G/50ML
2 SOLUTION INTRAVENOUS
Status: CANCELLED | OUTPATIENT
Start: 2017-09-18

## 2017-08-11 RX ORDER — SODIUM CHLORIDE, SODIUM LACTATE, POTASSIUM CHLORIDE, CALCIUM CHLORIDE 600; 310; 30; 20 MG/100ML; MG/100ML; MG/100ML; MG/100ML
INJECTION, SOLUTION INTRAVENOUS CONTINUOUS
Status: CANCELLED | OUTPATIENT
Start: 2017-09-18

## 2017-08-11 NOTE — TELEPHONE ENCOUNTER
Patient saw Haroon Hernandez this morning and has recommended a spinal cord stimulator trial. The patient takes aspirin and will need to stop 7 days before the procedure. Procedure is tentatively scheduled for 9/18 pending medication approval. Please advise.

## 2017-08-11 NOTE — TELEPHONE ENCOUNTER
MD Sade Lam, EWELINA 18 minutes ago (3:43 PM)      Ok to hold plavix/ASA for 5-7 days, resume ASAP post procedure.  (Routing comment)

## 2017-08-14 ENCOUNTER — PATIENT MESSAGE (OUTPATIENT)
Dept: DERMATOLOGY | Facility: CLINIC | Age: 67
End: 2017-08-14

## 2017-08-14 NOTE — PROGRESS NOTES
This note was completed with dictation software and grammatical errors may exist.    CC: back pain    HPI: The patient is a 67 year-old Female with a history of colon CA, sleep apnea, anemia, depression and postlaminectomy syndrome now with myelodysplastic syndrome who presents in referral from Dr. Villanueva.  She is status post bilateral L5/S1 facet joint injections on 7/14/17 with 0% relief.  She complains of bilateral low back pain radiating to the lateral thighs and right lower leg.  She is concerned because her pain has been existing since fall 2015.  Her pain is worse with activity, improved with rest.  However, she is never pain-free.  She denies weakness or numbness but does report some bladder incontinence.  She denies bowel incontinence.    Pain intervention history:  She is status post caudal epidural steroid injection on 5/7/14 with 90% relief lasting 3 days, then the pain slowly came back over 7 days.  She is status post caudal epidural steroid injection on 6/17/14 with 70% relief.  She is status post caudal epidural steroid injection on 9/10/15 with almost complete relief lasting 5-6 days.  She is status post left L3 and L5 transforaminal epidural steroid injections on 10/14/15 with 0% relief.  She is status post left L4 transforaminal epidural steroid injection on 1/13/16 with about 90% relief lasting one week.  She underwent removal spinal cord stimulator and L4/5 hardware on 3/16/16 with Dr. Aldrich.  She is status post caudal epidural steroid injection on 4/25/17 with 0% relief.  She is status post bilateral L5/S1 facet joint injections on 7/14/17 with 0% relief.     ROS:She reports fatigue, joint pain.  Balance of review of systems is negative.    Medical, surgical, family and social history reviewed elsewhere in record.    Medications/Allergies: See med card    Vitals:    08/11/17 1006   BP: 130/70   Pulse: 72   Resp: 18   Temp: 98.9 °F (37.2 °C)   TempSrc: Oral   Weight: 90.9 kg (200 lb 6.4  oz)   PainSc:   4   PainLoc: Back         Physical exam:  Gen: A and O x3, pleasant, well-groomed  Skin: No rashes or obvious lesions  HEENT: PERRLA  CVS: Regular rate and rhythm, normal S1 and S2, no murmurs.  Resp: Clear to auscultation bilaterally, no wheezes or rales.  Abdomen: Soft, NT/ND, normal bowel sounds present.  Musculoskeletal: Nonantalgic gait.    Neuro:  Lower extremities: 5/5 strength bilaterally  Reflexes: Patellar 0+, Achilles 2+ bilaterally.  Sensory:  Intact and symmetrical to light touch and pinprick in L2-S1 dermatomes bilaterally.    Lumbar spine:  Lumbar spine: Range of motion is mildly limited with flexion and moderately limited with extension with increased pain in the right greater than left low back and right upper gluteal region during each maneuver but especially with extension and right oblique extension.  Luca's test is negative bilaterally.  Supine straight leg raise causes right greater than left lateral thigh pain.  Internal and external rotation of the hip causes no increased pain on either side.  Myofascial exam: Exquisite tenderness to palpation to the right lower lumbar paraspinous muscles.      Imaging:  MRI LUMBAR SPINE W/WO CONTRAST ON 8/22/06  1. MINIMAL L3-4 DISC BULGE CAUSING MINIMAL THECAL SAC COMPRESSION, AND THERE IS ALSO MILD BILATERAL DEGENERATIVE FACET ARTHROSIS.   2. L4-5 DISCECTOMY AND L5 LAMINECTOMY WITH NO EVIDENCE OF A SIGNIFICANT SPINAL CANAL COMPROMISE. THERE IS MILD BILATERAL FORAMINAL NARROWING AT THE L4-5 LEVEL   3. DEGENERATION AND MILD BULGING OF THE L5-S1 DISC WITH ASSOCIATED MILD THECAL SAC COMPRESSION.   4. ROUNDED MASS ARISING FROM THE LEFT KIDNEY WHICH MOST LIKELY REPRESENTS A CYST. ULTRASOUND IS RECOMMENDED TO EXCLUDE A RENAL NEOPLASM.    2/11/14 x-ray tib-fib:  No obvious right tibial or fibular fracture is noted. No worrisome abnormality is noted. A linear metallic density is noted on the lateral view overlying the forefoot dorsally near the  navicular cuneiform articulation. This could relate to a foreign body or overlying density relating to clothing. Please clinically correlate a small quadriceps patellar spur is noted.    10/1/15 CT lumbar spine  T12/L1: Mild disk bulging is evident and degenerative facet changes are noted at this level. The canal and foramina are grossly intact.  L1/L2: Mild annular disk bulge and mild degenerative facet changes produce only minimal canal and foraminal distortion to  L2/L3: Degenerative facet changes noted in the mild annular disk and facet disease.  L3/L4: Posterior canal ligamentous hypertrophy and degenerative facet changes are present. There is moderate annular disk bulging. This combines to produce mild canal and left greater than right foraminal narrowing. This is slightly worsened from the previous exam.  L4/L5: The canal is grossly intact as the surgical level. Degenerative facet changes are noted bilaterally.  L5/S1: Moderate degenerative facet changes noted bilaterally and there is suggestion of disk bulging with left greater than right foraminal narrowing.    11/16/16 MRI lumbar spine with and without contrast  There have been prior laminectomies at L4 and L5, with interbody spacer present at L4-5.  Vertebral body height and alignment are within normal limits.  Marrow signal is heterogeneous.  There is moderate marrow enhancement within the L4 and L5 vertebral bodies centered about the disc space which is likely reactive.  The conus terminates at T12-L1.  Disc desiccation is present throughout the lumbar spine.  Mild loss of disc height is present at the L5-S1 level.  L1-L2:  No disc herniation. No spinal canal or neuroforaminal narrowing.  L2-L3:  No disc herniation. No spinal canal or neuroforaminal narrowing.  L3-L4:  There is mild diffuse disc bulging and moderate facet arthropathy with significant thickening of the ligament of flavum.  The findings contribute to mild spinal canal narrowing without  significant neuroforaminal narrowing.  L4-L5:  Moderate bilateral neuroforaminal narrowing is present which appears predominantly due to facet arthropathy, with some contribution from enhancing scar tissue formation within the neuroforamen on the right.  No spinal canal narrowing.  L5-S1:  There is mild broad-based posterior bulging of the disc, abdomen and moderate bilateral facet arthropathy.  There is resultant mild bilateral neuroforaminal narrowing.      Assessment:  The patient is a 67year-old Female with a history of colon CA, sleep apnea, anemia, depression and postlaminectomy syndrome now with myelodysplastic syndrome who presents in referral from Dr. Villanueva.  1. Lumbar radiculopathy  Vital signs    Activity as tolerated    Place 18-22 gauage peripheral IV     Verify informed consent    Notify physician     Notify physician     Notify physician (specify)    Diet NPO    Case Request Operating Room: TRIAL-STIMULATOR-DORSAL COLUMN    Place in Outpatient    lactated ringers infusion    cefazolin (ANCEF) 2 gram in dextrose 5% 50 mL IVPB (premix)   2. Chronic pain associated with significant psychosocial dysfunction     3. Lumbar radiculitis         Plan:  1.  The patient did not have relief following a caudal NIC or facet joint injections.  I do not believe she is a surgical candidate based on her most recent MRI findings.  We discussed spinal cord stimulation and she would like to try a trial with Mg.  I discussed the risks involved and I will order a back brace with lateral support.  We will set her up with a trial and she will follow-up 5 days postop or sooner as needed.    Greater than 50% of this 15 minute visit was spent on counseling the patient.

## 2017-08-16 ENCOUNTER — OFFICE VISIT (OUTPATIENT)
Dept: DERMATOLOGY | Facility: CLINIC | Age: 67
End: 2017-08-16
Payer: MEDICARE

## 2017-08-16 VITALS — HEIGHT: 67 IN | WEIGHT: 200 LBS | BODY MASS INDEX: 31.39 KG/M2

## 2017-08-16 DIAGNOSIS — Z12.83 SKIN CANCER SCREENING: ICD-10-CM

## 2017-08-16 DIAGNOSIS — Z85.828 PERSONAL HISTORY OF OTHER MALIGNANT NEOPLASM OF SKIN: Primary | ICD-10-CM

## 2017-08-16 DIAGNOSIS — D48.5 NEOPLASM OF UNCERTAIN BEHAVIOR OF SKIN: ICD-10-CM

## 2017-08-16 DIAGNOSIS — L82.1 SEBORRHEIC KERATOSES: ICD-10-CM

## 2017-08-16 PROCEDURE — 11100 PR BIOPSY OF SKIN LESION: CPT | Mod: PBBFAC,PO | Performed by: DERMATOLOGY

## 2017-08-16 PROCEDURE — 3008F BODY MASS INDEX DOCD: CPT | Mod: ,,, | Performed by: DERMATOLOGY

## 2017-08-16 PROCEDURE — 99213 OFFICE O/P EST LOW 20 MIN: CPT | Mod: 25,S$PBB,, | Performed by: DERMATOLOGY

## 2017-08-16 PROCEDURE — 88305 TISSUE EXAM BY PATHOLOGIST: CPT | Performed by: PATHOLOGY

## 2017-08-16 PROCEDURE — 99999 PR PBB SHADOW E&M-EST. PATIENT-LVL III: CPT | Mod: PBBFAC,,, | Performed by: DERMATOLOGY

## 2017-08-16 PROCEDURE — 1159F MED LIST DOCD IN RCRD: CPT | Mod: ,,, | Performed by: DERMATOLOGY

## 2017-08-16 PROCEDURE — 11100 PR BIOPSY OF SKIN LESION: CPT | Mod: S$PBB,,, | Performed by: DERMATOLOGY

## 2017-08-16 PROCEDURE — 1126F AMNT PAIN NOTED NONE PRSNT: CPT | Mod: ,,, | Performed by: DERMATOLOGY

## 2017-08-16 PROCEDURE — 99213 OFFICE O/P EST LOW 20 MIN: CPT | Mod: PBBFAC,PO | Performed by: DERMATOLOGY

## 2017-08-16 NOTE — PROGRESS NOTES
Subjective:       Patient ID:  Charmaine Whalen is a 67 y.o. female who presents for No chief complaint on file.    Presents today with new growth to right eye that started a few years ago very tiny. No treatment tried. In last few months has rapidly grown. Previous skin cancer to right outside eye that was treated with MOHS in 2013 by Yonas.  Phx skin ca - types unknown  R lateral canthus s/p mohs by Dr. Medina  Phx AK's - cryo tx   Phx MDS (myelodysplastic syndrome) Chronic - Dr Xavier      Maternal uncle had melanoma            Review of Systems   Constitutional: Negative for weight loss, weight gain and fatigue.   Skin: Positive for wears hat. Negative for daily sunscreen use and activity-related sunscreen use (avoids sun).   Hematologic/Lymphatic: Bruises/bleeds easily.        Objective:    Physical Exam   Constitutional: She appears well-developed and well-nourished. No distress.   HENT:   Head:       Mouth/Throat: Lips normal.    Eyes: Lids are normal.  No conjunctival no injection.       Neurological: She is alert and oriented to person, place, and time. She is not disoriented.   Psychiatric: She has a normal mood and affect.   Skin:   Areas Examined (abnormalities noted in diagram):   Scalp / Hair Palpated and Inspected  Head / Face Inspection Performed  Neck Inspection Performed  Chest / Axilla Inspection Performed  RUE Inspected  LUE Inspection Performed         Diagram Legend     Erythematous scaling macule/papule c/w actinic keratosis       Vascular papule c/w angioma      Pigmented verrucoid papule/plaque c/w seborrheic keratosis      Yellow umbilicated papule c/w sebaceous hyperplasia      Irregularly shaped tan macule c/w lentigo     1-2 mm smooth white papules consistent with Milia      Movable subcutaneous cyst with punctum c/w epidermal inclusion cyst      Subcutaneous movable cyst c/w pilar cyst      Firm pink to brown papule c/w dermatofibroma      Pedunculated fleshy papule(s) c/w skin  tag(s)      Evenly pigmented macule c/w junctional nevus     Mildly variegated pigmented, slightly irregular-bordered macule c/w mildly atypical nevus      Flesh colored to evenly pigmented papule c/w intradermal nevus       Pink pearly papule/plaque c/w basal cell carcinoma      Erythematous hyperkeratotic cursted plaque c/w SCC      Surgical scar with no sign of skin cancer recurrence      Open and closed comedones      Inflammatory papules and pustules      Verrucoid papule consistent consistent with wart     Erythematous eczematous patches and plaques     Dystrophic onycholytic nail with subungual debris c/w onychomycosis     Umbilicated papule    Erythematous-base heme-crusted tan verrucoid plaque consistent with inflamed seborrheic keratosis     Erythematous Silvery Scaling Plaque c/w Psoriasis     See annotation      Assessment / Plan:      Pathology Orders:      Normal Orders This Visit    Tissue Specimen To Pathology, Dermatology     Questions:    Directional Terms:  Other(comment)    Clinical information:  ISK vs other    Specific Site:  right superior lid        Personal history of other malignant neoplasm of skin  Declines complete UBSE  Area(s) of previous NMSC evaluated with no signs of recurrence.   No lesions suspicious for malignancy noted.      Skin cancer screening  Declines complete UBSE  Area(s) of previous NMSC evaluated with no signs of recurrence.   No lesions suspicious for malignancy noted.  Seborrheic keratoses, face  These are benign inherited growths without a malignant potential. Reassurance given to patient. No treatment is necessary.       Neoplasm of uncertain behavior of skin  -     Tissue Specimen To Pathology, Dermatology    Shave biopsy procedure note:    Shave biopsy performed after verbal consent including risk of infection, scar, recurrence, need for additional treatment of site. Area prepped with alcohol, anesthetized with approximately 1.0cc of 1% lidocaine with epinephrine.  Lesional tissue shaved with razor blade. Hemostasis achieved with application of aluminum chloride followed by hyfrecation. No complications. Dressing applied. Wound care explained.                 Return in about 6 months (around 2/16/2018).

## 2017-08-21 ENCOUNTER — OFFICE VISIT (OUTPATIENT)
Dept: CARDIOLOGY | Facility: CLINIC | Age: 67
End: 2017-08-21
Payer: MEDICARE

## 2017-08-21 VITALS
WEIGHT: 197.06 LBS | HEIGHT: 67 IN | BODY MASS INDEX: 30.93 KG/M2 | SYSTOLIC BLOOD PRESSURE: 124 MMHG | DIASTOLIC BLOOD PRESSURE: 77 MMHG | HEART RATE: 62 BPM

## 2017-08-21 DIAGNOSIS — R94.39 ABNORMAL THALLIUM STRESS TEST: ICD-10-CM

## 2017-08-21 DIAGNOSIS — I34.0 NON-RHEUMATIC MITRAL REGURGITATION: ICD-10-CM

## 2017-08-21 DIAGNOSIS — R06.09 DOE (DYSPNEA ON EXERTION): ICD-10-CM

## 2017-08-21 DIAGNOSIS — I25.119 ATHEROSCLEROSIS OF NATIVE CORONARY ARTERY OF NATIVE HEART WITH ANGINA PECTORIS: ICD-10-CM

## 2017-08-21 DIAGNOSIS — I48.0 PAROXYSMAL ATRIAL FIBRILLATION: Primary | Chronic | ICD-10-CM

## 2017-08-21 DIAGNOSIS — I24.9 ACS (ACUTE CORONARY SYNDROME): ICD-10-CM

## 2017-08-21 PROCEDURE — 1159F MED LIST DOCD IN RCRD: CPT | Mod: ,,, | Performed by: INTERNAL MEDICINE

## 2017-08-21 PROCEDURE — 1126F AMNT PAIN NOTED NONE PRSNT: CPT | Mod: ,,, | Performed by: INTERNAL MEDICINE

## 2017-08-21 PROCEDURE — 99213 OFFICE O/P EST LOW 20 MIN: CPT | Mod: S$PBB,,, | Performed by: INTERNAL MEDICINE

## 2017-08-21 PROCEDURE — 3078F DIAST BP <80 MM HG: CPT | Mod: ,,, | Performed by: INTERNAL MEDICINE

## 2017-08-21 PROCEDURE — 99213 OFFICE O/P EST LOW 20 MIN: CPT | Mod: PBBFAC,PO | Performed by: INTERNAL MEDICINE

## 2017-08-21 PROCEDURE — 99499 UNLISTED E&M SERVICE: CPT | Mod: S$PBB,,, | Performed by: INTERNAL MEDICINE

## 2017-08-21 PROCEDURE — 3074F SYST BP LT 130 MM HG: CPT | Mod: ,,, | Performed by: INTERNAL MEDICINE

## 2017-08-21 PROCEDURE — 99999 PR PBB SHADOW E&M-EST. PATIENT-LVL III: CPT | Mod: PBBFAC,,, | Performed by: INTERNAL MEDICINE

## 2017-08-21 NOTE — PROGRESS NOTES
Subjective:    Patient ID:  Charmaine Whalen is a 67 y.o. female who presents for follow-up of Dizziness and Atrial Fibrillation      HPI  Here for follow up of MR/non-obs CAD.Patient is exercising regularly with out symptoms. Occasional orthostatic sx's.     Review of Systems   Constitution: Negative for malaise/fatigue.   Eyes: Negative for blurred vision.   Cardiovascular: Negative for chest pain, claudication, cyanosis, dyspnea on exertion, irregular heartbeat, leg swelling, near-syncope, orthopnea, palpitations, paroxysmal nocturnal dyspnea and syncope.   Respiratory: Negative for cough and shortness of breath.    Hematologic/Lymphatic: Does not bruise/bleed easily.   Musculoskeletal: Negative for back pain, falls, joint pain, muscle cramps, muscle weakness and myalgias.   Gastrointestinal: Negative for abdominal pain, change in bowel habit, nausea and vomiting.   Genitourinary: Negative for urgency.   Neurological: Negative for dizziness, focal weakness and light-headedness.        Objective:    Physical Exam   Constitutional: She is oriented to person, place, and time. She appears well-developed and well-nourished.   Neck: Normal range of motion. No JVD present.   Cardiovascular: Normal rate, regular rhythm, normal heart sounds and intact distal pulses.    Pulmonary/Chest: Effort normal and breath sounds normal.   Neurological: She is alert and oriented to person, place, and time.   Skin: Skin is warm and dry.   Psychiatric: She has a normal mood and affect.   Nursing note and vitals reviewed.            ..    Chemistry        Component Value Date/Time     05/29/2017 1251    K 4.6 05/29/2017 1251     05/29/2017 1251    CO2 29 05/29/2017 1251    BUN 19 (H) 05/29/2017 1251    CREATININE 0.79 05/29/2017 1251    GLU 75 05/29/2017 1251        Component Value Date/Time    CALCIUM 8.8 05/29/2017 1251    ALKPHOS 84 05/29/2017 1251    AST 24 05/29/2017 1251    AST 24 11/09/2015 1449    ALT 46 (H)  05/29/2017 1251    BILITOT 0.5 05/29/2017 1251    ESTGFRAFRICA >60 05/29/2017 1251    EGFRNONAA >60 05/29/2017 1251            ..  Lab Results   Component Value Date    CHOL 143 02/17/2017    CHOL 184 01/08/2015    CHOL 194 07/23/2013     Lab Results   Component Value Date    HDL 43 02/17/2017    HDL 53 01/08/2015    HDL 44 07/23/2013     Lab Results   Component Value Date    LDLCALC 86.6 02/17/2017    LDLCALC 116.4 01/08/2015    LDLCALC 130.0 07/23/2013     Lab Results   Component Value Date    TRIG 67 02/17/2017    TRIG 73 01/08/2015    TRIG 98 07/23/2013     Lab Results   Component Value Date    CHOLHDL 30.1 02/17/2017    CHOLHDL 28.8 01/08/2015    CHOLHDL 22.7 07/23/2013     ..  Lab Results   Component Value Date    WBC 3.48 (L) 05/29/2017    HGB 10.4 (L) 05/29/2017    HCT 32.1 (L) 05/29/2017     (H) 05/29/2017     (L) 05/29/2017       Test(s) Reviewed  I have reviewed the following in detail:  [] Stress test   [] Angiography   [] Echocardiogram   [x] Labs   [] Other:       Assessment:         ICD-10-CM ICD-9-CM   1. Paroxysmal atrial fibrillation I48.0 427.31   2. Non-rheumatic mitral regurgitation I34.0 424.0   3. Abnormal thallium stress test R94.39 794.39   4. Atherosclerosis of native coronary artery of native heart with angina pectoris I25.119 414.01     413.9   5. ACS (acute coronary syndrome) I24.9 411.1   6. RAMIREZ (dyspnea on exertion) R06.09 786.09     Problem List Items Addressed This Visit     Abnormal thallium stress test    Overview     2/17  Mild, moderate diagonal disease.  Small distal LAD.  Mild diastolic dysfunction.         AF (atrial fibrillation) - Primary (Chronic)    Overview     PAF in setting of iatrogenic hyperthyroid                                    s/p AFL RFA 2008         Atherosclerotic heart disease of native coronary artery with angina pectoris    Overview     2/17 [facility]  Mild, moderate diagonal disease.  Small distal LAD.  Mild diastolic dysfunction.         RAMIREZ  (dyspnea on exertion)    Mitral regurgitation      Other Visit Diagnoses     ACS (acute coronary syndrome)               Plan:           Return to clinic 1 year   Low level/low impact aerobic exercise 5x's/wk. Heart healthy diet and risk factor modification.    See labs and med orders.  CFD next year

## 2017-09-05 ENCOUNTER — APPOINTMENT (OUTPATIENT)
Dept: PAIN MEDICINE | Facility: CLINIC | Age: 67
End: 2017-09-05
Payer: MEDICARE

## 2017-09-05 DIAGNOSIS — Z11.9 SCREENING EXAMINATION FOR INFECTIOUS DISEASE: Primary | ICD-10-CM

## 2017-09-05 PROCEDURE — 87081 CULTURE SCREEN ONLY: CPT

## 2017-09-08 LAB — MRSA SPEC QL CULT: NORMAL

## 2017-09-13 DIAGNOSIS — M51.36 DDD (DEGENERATIVE DISC DISEASE), LUMBAR: Primary | ICD-10-CM

## 2017-09-15 ENCOUNTER — ANESTHESIA EVENT (OUTPATIENT)
Dept: SURGERY | Facility: HOSPITAL | Age: 67
End: 2017-09-15
Payer: MEDICARE

## 2017-09-18 ENCOUNTER — HOSPITAL ENCOUNTER (OUTPATIENT)
Facility: HOSPITAL | Age: 67
Discharge: HOME | End: 2017-09-18
Attending: ANESTHESIOLOGY | Admitting: ANESTHESIOLOGY
Payer: MEDICARE

## 2017-09-18 ENCOUNTER — HOSPITAL ENCOUNTER (OUTPATIENT)
Dept: RADIOLOGY | Facility: HOSPITAL | Age: 67
Discharge: HOME | End: 2017-09-18
Attending: ANESTHESIOLOGY
Payer: MEDICARE

## 2017-09-18 ENCOUNTER — ANESTHESIA (OUTPATIENT)
Dept: SURGERY | Facility: HOSPITAL | Age: 67
End: 2017-09-18
Payer: MEDICARE

## 2017-09-18 ENCOUNTER — SURGERY (OUTPATIENT)
Age: 67
End: 2017-09-18

## 2017-09-18 VITALS
TEMPERATURE: 97 F | RESPIRATION RATE: 18 BRPM | BODY MASS INDEX: 30.61 KG/M2 | OXYGEN SATURATION: 98 % | HEART RATE: 68 BPM | SYSTOLIC BLOOD PRESSURE: 137 MMHG | HEIGHT: 67 IN | DIASTOLIC BLOOD PRESSURE: 73 MMHG | WEIGHT: 195 LBS

## 2017-09-18 DIAGNOSIS — M54.16 LUMBAR RADICULOPATHY: ICD-10-CM

## 2017-09-18 DIAGNOSIS — G89.4 CHRONIC PAIN ASSOCIATED WITH SIGNIFICANT PSYCHOSOCIAL DYSFUNCTION: Primary | ICD-10-CM

## 2017-09-18 DIAGNOSIS — M51.36 DDD (DEGENERATIVE DISC DISEASE), LUMBAR: ICD-10-CM

## 2017-09-18 PROCEDURE — 37000008 HC ANESTHESIA 1ST 15 MINUTES: Mod: PO | Performed by: ANESTHESIOLOGY

## 2017-09-18 PROCEDURE — 25000003 PHARM REV CODE 250: Mod: PO | Performed by: NURSE ANESTHETIST, CERTIFIED REGISTERED

## 2017-09-18 PROCEDURE — 36000706: Mod: PO | Performed by: ANESTHESIOLOGY

## 2017-09-18 PROCEDURE — 76000 FLUOROSCOPY <1 HR PHYS/QHP: CPT | Mod: TC,PO

## 2017-09-18 PROCEDURE — 71000033 HC RECOVERY, INTIAL HOUR: Mod: PO | Performed by: ANESTHESIOLOGY

## 2017-09-18 PROCEDURE — 71000039 HC RECOVERY, EACH ADD'L HOUR: Mod: PO | Performed by: ANESTHESIOLOGY

## 2017-09-18 PROCEDURE — 63600175 PHARM REV CODE 636 W HCPCS: Mod: PO | Performed by: ANESTHESIOLOGY

## 2017-09-18 PROCEDURE — 25000003 PHARM REV CODE 250: Mod: PO | Performed by: ANESTHESIOLOGY

## 2017-09-18 PROCEDURE — C1778 LEAD, NEUROSTIMULATOR: HCPCS | Mod: PO | Performed by: ANESTHESIOLOGY

## 2017-09-18 PROCEDURE — 37000009 HC ANESTHESIA EA ADD 15 MINS: Mod: PO | Performed by: ANESTHESIOLOGY

## 2017-09-18 PROCEDURE — 63650 IMPLANT NEUROELECTRODES: CPT | Mod: 51,,, | Performed by: ANESTHESIOLOGY

## 2017-09-18 PROCEDURE — D9220A PRA ANESTHESIA: Mod: CRNA,,, | Performed by: NURSE ANESTHETIST, CERTIFIED REGISTERED

## 2017-09-18 PROCEDURE — D9220A PRA ANESTHESIA: Mod: ANES,,, | Performed by: ANESTHESIOLOGY

## 2017-09-18 PROCEDURE — 36000707: Mod: PO | Performed by: ANESTHESIOLOGY

## 2017-09-18 PROCEDURE — 95971 ALYS SMPL SP/PN NPGT W/PRGRM: CPT | Mod: 59,,, | Performed by: ANESTHESIOLOGY

## 2017-09-18 PROCEDURE — 63600175 PHARM REV CODE 636 W HCPCS: Mod: PO | Performed by: NURSE ANESTHETIST, CERTIFIED REGISTERED

## 2017-09-18 DEVICE — KIT TRIAL LEAD 50CM 5MM: Type: IMPLANTABLE DEVICE | Site: BILE DUCT | Status: FUNCTIONAL

## 2017-09-18 RX ORDER — CEFAZOLIN SODIUM 2 G/50ML
2 SOLUTION INTRAVENOUS
Status: COMPLETED | OUTPATIENT
Start: 2017-09-18 | End: 2017-09-18

## 2017-09-18 RX ORDER — OXYCODONE AND ACETAMINOPHEN 10; 325 MG/1; MG/1
1 TABLET ORAL ONCE
Status: COMPLETED | OUTPATIENT
Start: 2017-09-18 | End: 2017-09-18

## 2017-09-18 RX ORDER — PROPOFOL 10 MG/ML
VIAL (ML) INTRAVENOUS CONTINUOUS PRN
Status: DISCONTINUED | OUTPATIENT
Start: 2017-09-18 | End: 2017-09-18

## 2017-09-18 RX ORDER — MIDAZOLAM HYDROCHLORIDE 1 MG/ML
INJECTION, SOLUTION INTRAMUSCULAR; INTRAVENOUS
Status: DISCONTINUED | OUTPATIENT
Start: 2017-09-18 | End: 2017-09-18

## 2017-09-18 RX ORDER — SODIUM CHLORIDE, SODIUM LACTATE, POTASSIUM CHLORIDE, CALCIUM CHLORIDE 600; 310; 30; 20 MG/100ML; MG/100ML; MG/100ML; MG/100ML
INJECTION, SOLUTION INTRAVENOUS CONTINUOUS
Status: DISCONTINUED | OUTPATIENT
Start: 2017-09-18 | End: 2017-09-18

## 2017-09-18 RX ORDER — FENTANYL CITRATE 50 UG/ML
INJECTION, SOLUTION INTRAMUSCULAR; INTRAVENOUS
Status: DISCONTINUED | OUTPATIENT
Start: 2017-09-18 | End: 2017-09-18

## 2017-09-18 RX ORDER — SODIUM CHLORIDE, SODIUM LACTATE, POTASSIUM CHLORIDE, CALCIUM CHLORIDE 600; 310; 30; 20 MG/100ML; MG/100ML; MG/100ML; MG/100ML
INJECTION, SOLUTION INTRAVENOUS CONTINUOUS
Status: DISCONTINUED | OUTPATIENT
Start: 2017-09-18 | End: 2017-09-18 | Stop reason: HOSPADM

## 2017-09-18 RX ORDER — LIDOCAINE HYDROCHLORIDE 10 MG/ML
INJECTION, SOLUTION EPIDURAL; INFILTRATION; INTRACAUDAL; PERINEURAL
Status: DISCONTINUED | OUTPATIENT
Start: 2017-09-18 | End: 2017-09-18 | Stop reason: HOSPADM

## 2017-09-18 RX ORDER — LIDOCAINE HYDROCHLORIDE 10 MG/ML
1 INJECTION, SOLUTION EPIDURAL; INFILTRATION; INTRACAUDAL; PERINEURAL ONCE
Status: DISCONTINUED | OUTPATIENT
Start: 2017-09-18 | End: 2017-09-18 | Stop reason: HOSPADM

## 2017-09-18 RX ORDER — BACITRACIN ZINC 500 UNIT/G
OINTMENT (GRAM) TOPICAL
Status: DISCONTINUED | OUTPATIENT
Start: 2017-09-18 | End: 2017-09-18 | Stop reason: HOSPADM

## 2017-09-18 RX ORDER — KETAMINE HYDROCHLORIDE 100 MG/ML
INJECTION, SOLUTION INTRAMUSCULAR; INTRAVENOUS
Status: DISCONTINUED | OUTPATIENT
Start: 2017-09-18 | End: 2017-09-18

## 2017-09-18 RX ADMIN — LIDOCAINE HYDROCHLORIDE 5 ML: 10 INJECTION, SOLUTION EPIDURAL; INFILTRATION; INTRACAUDAL; PERINEURAL at 12:09

## 2017-09-18 RX ADMIN — MIDAZOLAM HYDROCHLORIDE 2 MG: 1 INJECTION, SOLUTION INTRAMUSCULAR; INTRAVENOUS at 12:09

## 2017-09-18 RX ADMIN — CEFAZOLIN SODIUM 2 G: 2 SOLUTION INTRAVENOUS at 12:09

## 2017-09-18 RX ADMIN — BACITRACIN ZINC 0.25 TUBE: 500 OINTMENT TOPICAL at 01:09

## 2017-09-18 RX ADMIN — KETAMINE HYDROCHLORIDE 25 MG: 100 INJECTION, SOLUTION, CONCENTRATE INTRAMUSCULAR; INTRAVENOUS at 12:09

## 2017-09-18 RX ADMIN — PROPOFOL 40 MCG/KG/MIN: 10 INJECTION, EMULSION INTRAVENOUS at 12:09

## 2017-09-18 RX ADMIN — SODIUM CHLORIDE, SODIUM LACTATE, POTASSIUM CHLORIDE, AND CALCIUM CHLORIDE: 600; 310; 30; 20 INJECTION, SOLUTION INTRAVENOUS at 12:09

## 2017-09-18 RX ADMIN — OXYCODONE HYDROCHLORIDE AND ACETAMINOPHEN 1 TABLET: 10; 325 TABLET ORAL at 01:09

## 2017-09-18 RX ADMIN — FENTANYL CITRATE 50 MCG: 50 INJECTION, SOLUTION INTRAMUSCULAR; INTRAVENOUS at 12:09

## 2017-09-18 RX ADMIN — FENTANYL CITRATE 50 MCG: 50 INJECTION, SOLUTION INTRAMUSCULAR; INTRAVENOUS at 01:09

## 2017-09-18 NOTE — TRANSFER OF CARE
"Anesthesia Transfer of Care Note    Patient: Charmaine Whalen    Procedure(s) Performed: Procedure(s) (LRB):  TRIAL-STIMULATOR-DORSAL COLUMN (N/A)    Patient location: PACU    Anesthesia Type: MAC    Transport from OR: Transported from OR on 2-3 L/min O2 by NC with adequate spontaneous ventilation    Post pain: adequate analgesia    Post assessment: no apparent anesthetic complications    Post vital signs: stable    Level of consciousness: awake, alert and oriented    Nausea/Vomiting: no nausea/vomiting    Complications: none    Transfer of care protocol was followed      Last vitals:   Visit Vitals  /68 (BP Location: Right arm, Patient Position: Lying)   Pulse (!) 59   Temp 36.7 °C (98.1 °F) (Skin)   Resp 16   Ht 5' 7" (1.702 m)   Wt 88.5 kg (195 lb)   SpO2 98%   Breastfeeding? No   BMI 30.54 kg/m²     "

## 2017-09-18 NOTE — H&P
CC: Back pain    HPI: The patient is a 66yo woman with a history of chronic pain syndrome here for SCS trial. There are no major changes in history and physical from 8/11/17.    Past Medical History:   Diagnosis Date    *Atrial fibrillation     History of Cardiac Ablation    Allergy     Anticoagulant long-term use     ASA 81 mg    Anxiety     Atherosclerotic heart disease of native coronary artery with angina pectoris     2/17 LHC Mild, moderate diagonal disease. Small distal LAD. Mild diastolic dysfunction.    Cataract     OU    Chronic pain     Leg, lower back    Colon cancer     Colon polyp     DDD (degenerative disc disease), lumbar     h/o sciatica    Depression     Encounter for blood transfusion     Esophageal stenosis     Fibromyalgia     Gastritis     GERD (gastroesophageal reflux disease)     hiatal as well    Hiatal hernia     History of psychiatric hospitalization     1997 at [place]    History of renal calculi     Hypersomnia with sleep apnea     uses C-pap    Hypothyroidism 1/28/2013    Hashimoto's, with Hypothyroidism    Irritable bowel syndrome     Meningitis     age 3    Myelodysplastic syndrome     bone marrow cancer    CORWIN (obstructive sleep apnea)     doesn't use her cpap    Personal history of colonic polyps     PVD (posterior vitreous detachment)     OU    Retinal detachment     HST-OS    Rheumatic fever     as a child    Supraventricular tachycardia        Past Surgical History:   Procedure Laterality Date    APPENDECTOMY      ATRIAL ABLATION SURGERY      BACK SURGERY      Right Spinal Cord Stimulator    BONE MARROW BIOPSY      myelodysplastic syndrome    CHOLECYSTECTOMY  11/26/2001    Laparascopic    COLON SURGERY  ~2011  ([facility]?  [facility])    Colon Resection (sigmoid?)    COLONOSCOPY  6/2015    Dr. orlando, repeat in 3 years    COLONOSCOPY W/ BIOPSIES AND POLYPECTOMY  5/05/2010  Ashish    COLONOSCOPY W/ POLYPECTOMY  6/19/2015  Michael    Four 1 to 4 mm  polyps at the hepatic flexure and distal ascending colon.  -Tubulovillous adenoma.   Diverticulosis in the sigmoid colon.  Patent end-to-end ileo-colonic anastomosis in the proximal/mid ascending colon.   Redundant colon.      Epidural Steroid Injection      Previous injection 17 years ago with Dr. Pang, Pain Management, 1 with Dr. Samuels    ESOPHAGOGASTRODUODENOSCOPY  11/06/2008    GERD & Gastritis    EYE SURGERY      Focal Laser OU    HYSTERECTOMY      ovaries spared    Lumbar Steroid Injection      Pain management    SCS REMOVAL  3/16/16    [facility]    SPINAL FUSION  1999    L4-L5    SPINE SURGERY  3/16/16    L4-5 PEDICLE SCREW & RODS REMOVED/[facility]    TUBAL LIGATION      UPPER GASTROINTESTINAL ENDOSCOPY  12/2015    Dr. Rodriguez    VAGINAL DELIVERY      times 3       Family History   Problem Relation Age of Onset    COPD Mother     Heart disease Mother     Cancer Mother      Breast    Stroke Father     Heart failure Father     Diabetes Father     Diabetes Sister        Social History     Social History    Marital status:      Spouse name: N/A    Number of children: N/A    Years of education: N/A     Social History Main Topics    Smoking status: Never Smoker    Smokeless tobacco: Never Used    Alcohol use No    Drug use: No    Sexual activity: Not Currently     Other Topics Concern    None     Social History Narrative    None       Current Facility-Administered Medications   Medication Dose Route Frequency Provider Last Rate Last Dose    cefazolin (ANCEF) 2 gram in dextrose 5% 50 mL IVPB (premix)  2 g Intravenous On Call Procedure Adan Samuels MD        lactated ringers infusion   Intravenous Continuous Martinez Mabry MD        lactated ringers infusion   Intravenous Continuous Adan Samuels MD        lidocaine (PF) 10 mg/ml (1%) injection 10 mg  1 mL Intradermal Once Martinez Mabry MD           Review of patient's allergies indicates:   Allergen Reactions  "   Sulfa (sulfonamide antibiotics) Hives     Other reaction(s): Hives       Vitals:    09/14/17 1523 09/18/17 1158   BP:  123/68   Pulse:  (!) 59   Resp:  16   Temp:  98.1 °F (36.7 °C)   TempSrc:  Skin   SpO2:  98%   Weight: 88.5 kg (195 lb)    Height: 5' 7" (1.702 m)        REVIEW OF SYSTEMS:     GENERAL: No weight loss, malaise or fevers.  HEENT:  No recent changes in vision or hearing  NECK: Negative for lumps, no difficulty with swallowing.  RESPIRATORY: Negative for cough, wheezing or shortness of breath, patient denies any recent URI.  CARDIOVASCULAR: Negative for chest pain, leg swelling or palpitations.  GI: Negative for abdominal discomfort, blood in stools or black stools or change in bowel habits.  MUSCULOSKELETAL: See HPI.  SKIN: Negative for lesions, rash, and itching.  PSYCH: No suicidal or homicidal ideations, no current mood disturbances.  HEMATOLOGY/LYMPHOLOGY: Negative for prolonged bleeding, bruising easily or swollen nodes. Patient is not currently taking any anti-coagulants  ENDO: No history of diabetes or thyroid dysfunction  NEURO: No history of syncope, paralysis, seizures or tremors.All other reviewed and negative other than HPI.    Physical exam:  Gen: A and O x3, pleasant, well-groomed  Skin: No rashes or obvious lesions  HEENT: PERRLA, no obvious deformities on ears or in canals. No thyroid masses, trachea midline, no palpable lymph nodes in neck, axilla.  CVS: Regular rate and rhythm, normal S1 and S2, no murmurs.  Resp: Clear to auscultation bilaterally.  Abdomen: Soft, NT/ND, normal bowel sounds present.  Musculoskeletal/Neuro: Moving all extremities    Assessment:  Lumbar radiculopathy  -     Case Request Operating Room: TRIAL-STIMULATOR-DORSAL COLUMN  -     Activity as tolerated; Standing  -     Place in Outpatient; Standing  -     Diet NPO; Standing  -     lactated ringers infusion; Inject into the vein continuous.  -     cefazolin (ANCEF) 2 gram in dextrose 5% 50 mL IVPB (premix); " Inject 50 mLs (2 g total) into the vein On call Procedure (Surgery).  -     Notify physician ; Standing  -     Notify physician ; Standing  -     Notify physician (specify); Standing  -     Place 18-22 gauage peripheral IV ; Standing  -     Verify informed consent; Standing  -     Vital signs; Standing    Chronic pain associated with significant psychosocial dysfunction    Other orders  -     Vital signs; Standing  -     Insert peripheral IV; Standing  -     Use 1% lidocaine at IV site; Standing  -     Notify Physician - Potential Need of Opioid Reversal; Standing  -     lactated ringers infusion; Inject into the vein continuous.  -     lidocaine (PF) 10 mg/ml (1%) injection 10 mg; Inject 1 mL (10 mg total) into the skin once.  -     Notify Anesthesiologist if Patient on Home Insulin Pump; Standing  -     Pulse Oximetry Q4H; Standing  -     Admit to Phase I recovery, transfer to phase II level of care when Ray score is 9 out of 10; Standing  -     Vital signs; Standing  -     Intake and output Per protocol; Standing  -     Apply warming blanket; Standing  -     Discharge home from Phase II when PADDS scoring system score is 9 to 10 on PADDS Scoring system met; Standing  -     POCT glucose; Standing  -     Notify Anesthesiologist; Standing  -     Notify anesthesiologist after 2 hours if Phase II level of care criteria not met; Standing  -     Notify anesthesiologist-Rapid Transition: After 30 minutes if Phase II level of care not met; Standing  -     Oxygen Continuous; Standing  -     Pulse Oximetry Continuous; Standing

## 2017-09-18 NOTE — DISCHARGE INSTRUCTIONS
Discharge Instructions: After Your Surgery  Youve just had surgery. During surgery, you were given medicine called anesthesia to keep you relaxed and free of pain. After surgery, you may have some pain or nausea. This is common. Here are some tips for feeling better and getting well after surgery.     Stay on schedule with your medicine.   Going home  Your healthcare provider will show you how to take care of yourself when you go home. He or she will also answer your questions. Have an adult family member or friend drive you home. For the first 24 hours after your surgery:  · Do not drive or use heavy equipment.  · Do not make important decisions or sign legal papers.  · Do not drink alcohol.  · Have someone stay with you, if needed. He or she can watch for problems and help keep you safe.  Be sure to go to all follow-up visits with your healthcare provider. And rest after your surgery for as long as your healthcare provider tells you to.  Coping with pain  If you have pain after surgery, pain medicine will help you feel better. Take it as told, before pain becomes severe. Also, ask your healthcare provider or pharmacist about other ways to control pain. This might be with heat, ice, or relaxation. And follow any other instructions your surgeon or nurse gives you.  Tips for taking pain medicine  To get the best relief possible, remember these points:  · Pain medicines can upset your stomach. Taking them with a little food may help.  · Most pain relievers taken by mouth need at least 20 to 30 minutes to start to work.  · Taking medicine on a schedule can help you remember to take it. Try to time your medicine so that you can take it before starting an activity. This might be before you get dressed, go for a walk, or sit down for dinner.  · Constipation is a common side effect of pain medicines. Call your healthcare provider before taking any medicines such as laxatives or stool softeners to help ease constipation.  Also ask if you should skip any foods. Drinking lots of fluids and eating foods such as fruits and vegetables that are high in fiber can also help. Remember, do not take laxatives unless your surgeon has prescribed them.  · Drinking alcohol and taking pain medicine can cause dizziness and slow your breathing. It can even be deadly. Do not drink alcohol while taking pain medicine.  · Pain medicine can make you react more slowly to things. Do not drive or run machinery while taking pain medicine.  Your healthcare provider may tell you to take acetaminophen to help ease your pain. Ask him or her how much you are supposed to take each day. Acetaminophen or other pain relievers may interact with your prescription medicines or other over-the-counter (OTC) medicines. Some prescription medicines have acetaminophen and other ingredients. Using both prescription and OTC acetaminophen for pain can cause you to overdose. Read the labels on your OTC medicines with care. This will help you to clearly know the list of ingredients, how much to take, and any warnings. It may also help you not take too much acetaminophen. If you have questions or do not understand the information, ask your pharmacist or healthcare provider to explain it to you before you take the OTC medicine.  Managing nausea  Some people have an upset stomach after surgery. This is often because of anesthesia, pain, or pain medicine, or the stress of surgery. These tips will help you handle nausea and eat healthy foods as you get better. If you were on a special food plan before surgery, ask your healthcare provider if you should follow it while you get better. These tips may help:  · Do not push yourself to eat. Your body will tell you when to eat and how much.  · Start off with clear liquids and soup. They are easier to digest.  · Next try semi-solid foods, such as mashed potatoes, applesauce, and gelatin, as you feel ready.  · Slowly move to solid foods. Dont  eat fatty, rich, or spicy foods at first.  · Do not force yourself to have 3 large meals a day. Instead eat smaller amounts more often.  · Take pain medicines with a small amount of solid food, such as crackers or toast, to avoid nausea.     Call your surgeon if  · You still have pain an hour after taking medicine. The medicine may not be strong enough.  · You feel too sleepy, dizzy, or groggy. The medicine may be too strong.  · You have side effects like nausea, vomiting, or skin changes, such as rash, itching, or hives.       If you have obstructive sleep apnea  You were given anesthesia medicine during surgery to keep you comfortable and free of pain. After surgery, you may have more apnea spells because of this medicine and other medicines you were given. The spells may last longer than usual.   At home:  · Keep using the continuous positive airway pressure (CPAP) device when you sleep. Unless your healthcare provider tells you not to, use it when you sleep, day or night. CPAP is a common device used to treat obstructive sleep apnea.  · Talk with your provider before taking any pain medicine, muscle relaxants, or sedatives. Your provider will tell you about the possible dangers of taking these medicines.  Date Last Reviewed: 12/1/2016 © 2000-2017 [employer]. [address] [place] [address], [place] [address]. All rights reserved. This information is not intended as a substitute for professional medical care. Always follow your healthcare professional's instructions.        SPINAL CORD STIMULATOR TRIAL    Please follow all of the instructions that were given to you and demonstrated by your [employer] Representative about the use and care of your remote and equipment.    A member from the [employer] team will call you each afternoon to remind you of restrictions and to offer any support you may need on the care and operation of your spinal cord stimulator device.     After your  procedure:    -Sponge baths only.  -Keep your bandage and all of the external components clean and dry.  -Please limit any necessary bending, lifting or twisting to slow and careful movements.  -Avoid overhead work. Keep your elbows below your shoulders.  -Avoid motions that cause pulling on your dressing or wires.  -Turn off your stimulator when driving. (Do not drive for the first 24 hours after your procedure)  -Wear your brace when you are walking or active. You do not have to wear the brace while you are in bed.    -You may resume your home medications as prescribed.  -You may resume your normal diet as tolerated.  -Follow up as scheduled with Dr. Samuels.       For programming or any questions you may have about your spinal cord stimulator, please call your [employer] Representative:    ___ Santos [phone]  ___ Jose [phone]  ___ David [phone]  ___ Anderson [phone]  ___ Haroon [phone]  ___ Luis Miguel [phone]  ___ Dyllan [phone]

## 2017-09-18 NOTE — OP NOTE
PROCEDURE DATE: 7/27/2016    Procedure  1. Percutaneous insertion of spinal cord stimulator leads x 2. Total of 16 contacts.   2. Fluoroscopic needle guidance.     Pre-op Diagnosis: : Chronic pain syndrome, lumbar radiculitis    Post-op Diagnosis: Same    Surgeon: Adan Samuels M.D.    Assistant: None    Anesthesia: MAC per Anesthesia Provider    Blood Loss: <10ml    Complications: Right side dural puncture    PROCEDURE NOTE: After obtaining written informed consent patient was taken to the procedure room. Pre-procedure blood pressure and pulse were stable and recorded in patients clinic chart. Pre-op IV antibiotics were started by the Anesthesia provider.    The patient was taken to the operating room and placed in prone position. Routine monitors were applied and IV anesthesia was titrated to effect by the Anesthesia provider. The patient's back and buttocks were prepped and draped in sterile fashion using betadine and then DuraPrep solution and sterile drapes were applied. C-arm fluoroscopy was used to identify the T12/L1 interspace. Through a 1% local lidocaine skin wheal, a small stab incision was made with a #11 blade scapel. Through this, a 14-gauge Tuohy needle was advanced until contact was made with the righg-sided L1 lamina. It was then walked off in a cephalad medial direction using loss of resistance to technique entering into the epidural space. Initially, dural puncture noted with some leak of CSF. Needle was slightly withdrawn and CSF leak discontinued. SCS lead was able to pass without resistance and lateral view taken to confirm. Once within the epidural space, an epidural spinal cord stimulator lead was advanced until the tip of the lead rested at the top of the  T8 vertebral body. Following this, another percutaneous lead was placed with another Tuohy needle on the left side following the same procedure as described for the other side and the tip rested at the top of the T9 vertebra. AP and  lateral views confirmed placement. Once the leads were noted to be within proper position, the needle was removed. The leads were secured to the patient's back using 0-0 silk. Skin was cleaned, bacitracin applied and a sterile dressing was applied.    Following the procedure the patient's vital signs were stable. The patient was taken to the recovery room in good condition with no known complication. The patient was allowed to fully awaken from anesthesia and final programming of the device was done by the device representative under my guidance. The patient was discharged home in good condition after being given discharge instructions.

## 2017-09-18 NOTE — DISCHARGE SUMMARY
[facility]  Discharge Note  Short Stay    Admit Date: 9/18/2017    Discharge Date: 9/18/2017    Attending Physician: Adan Samuels MD     Discharge Provider: Adan Samuels    Diagnoses:  Active Hospital Problems    Diagnosis  POA    *Chronic pain associated with significant psychosocial dysfunction [G89.4]  Yes      Resolved Hospital Problems    Diagnosis Date Resolved POA   No resolved problems to display.       Discharged Condition: good    Final Diagnoses: Lumbar radiculopathy [M54.16]  Pain syndrome, chronic [G89.4]    Disposition: Home or Self Care    Hospital Course: no complications, uneventful    Outcome of Hospitalization, Treatment, Procedure, or Surgery:  Patient was admitted for outpatient procedure. The patient underwent procedure without complications and are discharged home    Follow up/Patient Instructions:  Follow up as scheduled/Patient has received instructions and follow up date    Medications:  Continue previous medications      Discharge Procedure Orders  Diet general     Activity as tolerated     Call MD for:  temperature >100.4     Call MD for:  severe uncontrolled pain     Call MD for:  redness, tenderness, or signs of infection (pain, swelling, redness, odor or green/yellow discharge around incision site)     Call MD for:  severe persistent headache     No dressing needed           Discharge Procedure Orders (must include Diet, Follow-up, Activity):    Discharge Procedure Orders (must include Diet, Follow-up, Activity)  Diet general     Activity as tolerated     Call MD for:  temperature >100.4     Call MD for:  severe uncontrolled pain     Call MD for:  redness, tenderness, or signs of infection (pain, swelling, redness, odor or green/yellow discharge around incision site)     Call MD for:  severe persistent headache     No dressing needed

## 2017-09-18 NOTE — ANESTHESIA PREPROCEDURE EVALUATION
09/18/2017  Charmaine Whalen is a 67 y.o., female.    Anesthesia Evaluation      I have reviewed the Medications.     Review of Systems  Anesthesia Hx:  No problems with previous Anesthesia   Social:  Non-Smoker, No Alcohol Use    Hematology/Oncology:        Hematology Comments: Myelodysplastic disodrer   Cardiovascular:   CAD (nonobstructive)  Dysrhythmias atrial fibrillation    Pulmonary:   Sleep Apnea    Renal/:  Renal/ Normal     Hepatic/GI:   GERD    Musculoskeletal:   Arthritis  Hx spinal fusion Spine Disorders: lumbar Degenerative disease and Chronic Pain    Endocrine:   Hypothyroidism    Psych:   anxiety depression          Physical Exam  General:  Obesity    Airway/Jaw/Neck:  Airway Findings: Mouth Opening: Normal General Airway Assessment: Adult  Mallampati: II  Jaw/Neck Findings:  Neck ROM: Extension Decreased, Mild       Chest/Lungs:  Chest/Lungs Findings: Clear to auscultation, Normal Respiratory Rate     Heart/Vascular:  Heart Findings: Rate: Normal  Rhythm: Regular Rhythm  Sounds: Normal  Heart murmur: negative Vascular Findings: Normal (No carotid bruits.)       Mental Status:  Mental Status Findings:  Cooperative, Alert and Oriented         Anesthesia Plan  Type of Anesthesia, risks & benefits discussed:  Anesthesia Type:  MAC  Patient's Preference:   Intra-op Monitoring Plan:   Intra-op Monitoring Plan Comments:   Post Op Pain Control Plan:   Post Op Pain Control Plan Comments:   Induction:    Beta Blocker:  Patient is not currently on a Beta-Blocker (No further documentation required).       Informed Consent: Patient understands risks and agrees with Anesthesia plan.  Questions answered. Anesthesia consent signed with patient.  ASA Score: 3     Day of Surgery Review of History & Physical:            Ready For Surgery From Anesthesia Perspective.

## 2017-09-18 NOTE — ANESTHESIA POSTPROCEDURE EVALUATION
"Anesthesia Post Evaluation    Patient: Charmaine Whalen    Procedure(s) Performed: Procedure(s) (LRB):  TRIAL-STIMULATOR-DORSAL COLUMN (N/A)    Final Anesthesia Type: MAC  Patient location during evaluation: PACU  Patient participation: Yes- Able to Participate  Level of consciousness: awake and alert and oriented  Post-procedure vital signs: reviewed and stable  Pain management: adequate  Airway patency: patent  PONV status at discharge: No PONV  Anesthetic complications: no      Cardiovascular status: blood pressure returned to baseline and hemodynamically stable  Respiratory status: unassisted, spontaneous ventilation and room air  Hydration status: euvolemic  Follow-up not needed.        Visit Vitals  /73 (BP Location: Left arm, Patient Position: Lying)   Pulse 64   Temp 36.3 °C (97.4 °F) (Skin)   Resp 16   Ht 5' 7" (1.702 m)   Wt 88.5 kg (195 lb)   SpO2 98%   Breastfeeding? No   BMI 30.54 kg/m²       Pain/Ray Score: Pain Assessment Performed: Yes (9/18/2017  2:02 PM)  Presence of Pain: complains of pain/discomfort (9/18/2017  1:58 PM)  Pain Rating Prior to Med Admin: 5 (9/18/2017  1:58 PM)  Ray Score: 10 (9/18/2017  2:02 PM)      "

## 2017-09-19 ENCOUNTER — TELEPHONE (OUTPATIENT)
Dept: PAIN MEDICINE | Facility: CLINIC | Age: 67
End: 2017-09-19

## 2017-09-19 ENCOUNTER — NURSE TRIAGE (OUTPATIENT)
Dept: ADMINISTRATIVE | Facility: CLINIC | Age: 67
End: 2017-09-19

## 2017-09-19 RX ORDER — BUTALBITAL, ACETAMINOPHEN AND CAFFEINE 50; 325; 40 MG/1; MG/1; MG/1
1 TABLET ORAL EVERY 6 HOURS PRN
Qty: 20 TABLET | Refills: 0 | Status: SHIPPED | OUTPATIENT
Start: 2017-09-19 | End: 2017-10-19

## 2017-09-19 NOTE — TELEPHONE ENCOUNTER
Had a trial stimulator leads placed in lower back on yesterday was told that spine may have been knicked. Was advised to call if she has a headache. Pt states has had a headache on and off since yesterday. Rating headache 4/10 at this time.  Triage nurse Attempted to contact on call to advise. Dr. Samuels notes states refer to ED. Pt advised for headache rating < 5 call MD in 1.5 hours when office opens. If headache >5 prior to clinic opening go to ED.    Reason for Disposition   Nursing judgment    Protocols used: ST NO GUIDELINE OR REFERENCE [id number]

## 2017-09-19 NOTE — TELEPHONE ENCOUNTER
----- Message from Janie Fuentes sent at 9/19/2017  6:18 AM CDT -----  Contact: self  Patient had surgery yesterday patient states experiencing headache.   Patient states was advised if she get a headache need to go to ER   Patient states want to know how bad headache has to be to go to ER.  Patient need to speak with nurse   Please call pt at [phone]    FYI: I forward info to on call nurse

## 2017-09-19 NOTE — TELEPHONE ENCOUNTER
Continue laying down today, drink caffeinated drinks, lots of fluid and I'll send some pain medication for the headache.

## 2017-09-19 NOTE — TELEPHONE ENCOUNTER
Spoke with patient. Patient stated she had trial yesterday. Patient stated Dr. Coelho stated he may have knicked the spinal canal. Patient stated she has pressure headaches. Stated this is not relieved when lying down. Patient is asking what signs should she look for if she needs to go to ER. Patient is asking what the level of pain should be before she goes to ER. Please advise. Patient currently rates pain at 4/10. Please advise.

## 2017-09-22 ENCOUNTER — TELEPHONE (OUTPATIENT)
Dept: NEUROSURGERY | Facility: CLINIC | Age: 67
End: 2017-09-22

## 2017-09-25 ENCOUNTER — TELEPHONE (OUTPATIENT)
Dept: PAIN MEDICINE | Facility: CLINIC | Age: 67
End: 2017-09-25

## 2017-09-25 ENCOUNTER — OFFICE VISIT (OUTPATIENT)
Dept: PAIN MEDICINE | Facility: CLINIC | Age: 67
End: 2017-09-25
Payer: MEDICARE

## 2017-09-25 VITALS
DIASTOLIC BLOOD PRESSURE: 74 MMHG | TEMPERATURE: 99 F | RESPIRATION RATE: 18 BRPM | SYSTOLIC BLOOD PRESSURE: 120 MMHG | HEART RATE: 74 BPM | WEIGHT: 201.25 LBS | BODY MASS INDEX: 31.52 KG/M2

## 2017-09-25 DIAGNOSIS — M54.16 LUMBAR RADICULITIS: ICD-10-CM

## 2017-09-25 DIAGNOSIS — G89.4 CHRONIC PAIN ASSOCIATED WITH SIGNIFICANT PSYCHOSOCIAL DYSFUNCTION: Primary | ICD-10-CM

## 2017-09-25 PROCEDURE — 99024 POSTOP FOLLOW-UP VISIT: CPT | Mod: ,,, | Performed by: ANESTHESIOLOGY

## 2017-09-25 PROCEDURE — 99214 OFFICE O/P EST MOD 30 MIN: CPT | Mod: PBBFAC,PO | Performed by: ANESTHESIOLOGY

## 2017-09-25 PROCEDURE — 99999 PR PBB SHADOW E&M-EST. PATIENT-LVL IV: CPT | Mod: PBBFAC,,, | Performed by: ANESTHESIOLOGY

## 2017-09-25 PROCEDURE — 99499 UNLISTED E&M SERVICE: CPT | Mod: S$PBB,,, | Performed by: ANESTHESIOLOGY

## 2017-09-25 RX ORDER — CEFAZOLIN SODIUM 2 G/50ML
2 SOLUTION INTRAVENOUS
Status: CANCELLED | OUTPATIENT
Start: 2017-10-23

## 2017-09-25 RX ORDER — SODIUM CHLORIDE, SODIUM LACTATE, POTASSIUM CHLORIDE, CALCIUM CHLORIDE 600; 310; 30; 20 MG/100ML; MG/100ML; MG/100ML; MG/100ML
INJECTION, SOLUTION INTRAVENOUS CONTINUOUS
Status: CANCELLED | OUTPATIENT
Start: 2017-10-23

## 2017-09-25 RX ORDER — MIDAZOLAM HYDROCHLORIDE 5 MG/ML
4 INJECTION INTRAMUSCULAR; INTRAVENOUS ONCE
Status: CANCELLED | OUTPATIENT
Start: 2017-10-23

## 2017-09-25 NOTE — TELEPHONE ENCOUNTER
Patient saw Dr. Samuels this morning for a spinal cord stimulator lead pull. The patient did great during the trial. The patient is scheduled for the implant 10/23 and will need to stop aspirin 7 days before the procedure. Procedure scheduled for 10/23, pending medication approval. Please advise.

## 2017-09-25 NOTE — PROGRESS NOTES
"  This note was completed with dictation software and grammatical errors may exist.    CC: back pain    HPI: The patient is a 67 year-old Female with a history of colon CA, sleep apnea, anemia, depression and postlaminectomy syndrome now with myelodysplastic syndrome who presents in referral from Dr. Villanueva.  She returns in follow-up today after one week of spinal cord stimulation trial with Nevro.  She reports almost complete relief of her back and bilateral leg pain.  She reports that she feels it is a "miracle" and has not felt this good for years.  She reports that she was able to sleep better, she is able to stand and walk without pain.  She initially had some increased left leg pain likely secondary to the surgical procedure, also had headache for several days.  We noted that there was a dural puncture associated with the procedure but it sounds like her headache has now almost completely resolved.  She denies any fevers or chills.    Pain intervention history:  She is status post caudal epidural steroid injection on 5/7/14 with 90% relief lasting 3 days, then the pain slowly came back over 7 days.  She is status post caudal epidural steroid injection on 6/17/14 with 70% relief.  She is status post caudal epidural steroid injection on 9/10/15 with almost complete relief lasting 5-6 days.  She is status post left L3 and L5 transforaminal epidural steroid injections on 10/14/15 with 0% relief.  She is status post left L4 transforaminal epidural steroid injection on 1/13/16 with about 90% relief lasting one week.  She underwent removal spinal cord stimulator and L4/5 hardware on 3/16/16 with Dr. Aldrich.  She is status post caudal epidural steroid injection on 4/25/17 with 0% relief.  She is status post bilateral L5/S1 facet joint injections on 7/14/17 with 0% relief.     ROS:She reports fatigue, joint pain.  Balance of review of systems is negative.    Medical, surgical, family and social history reviewed " elsewhere in record.    Medications/Allergies: See med card    Vitals:    09/25/17 0853   BP: 120/74   Pulse: 74   Resp: 18   Temp: 98.6 °F (37 °C)   TempSrc: Oral   Weight: 91.3 kg (201 lb 4.5 oz)   PainSc: 0-No pain         Physical exam:  Gen: A and O x3, pleasant, well-groomed  Skin: No rashes or obvious lesions  HEENT: PERRLA  CVS: Regular rate and rhythm, normal S1 and S2, no murmurs.  Resp: Clear to auscultation bilaterally, no wheezes or rales.  Abdomen: Soft, NT/ND, normal bowel sounds present.  Musculoskeletal: Nonantalgic gait.    Neuro:  Lower extremities: 5/5 strength bilaterally  Reflexes: Patellar 0+, Achilles 2+ bilaterally.  Sensory:  Intact and symmetrical to light touch and pinprick in L2-S1 dermatomes bilaterally.    Lead insertion sites are clean, dry, intact.  There is suture around each lead.    Imaging:  MRI LUMBAR SPINE W/WO CONTRAST ON 8/22/06  1. MINIMAL L3-4 DISC BULGE CAUSING MINIMAL THECAL SAC COMPRESSION, AND THERE IS ALSO MILD BILATERAL DEGENERATIVE FACET ARTHROSIS.   2. L4-5 DISCECTOMY AND L5 LAMINECTOMY WITH NO EVIDENCE OF A SIGNIFICANT SPINAL CANAL COMPROMISE. THERE IS MILD BILATERAL FORAMINAL NARROWING AT THE L4-5 LEVEL   3. DEGENERATION AND MILD BULGING OF THE L5-S1 DISC WITH ASSOCIATED MILD THECAL SAC COMPRESSION.   4. ROUNDED MASS ARISING FROM THE LEFT KIDNEY WHICH MOST LIKELY REPRESENTS A CYST. ULTRASOUND IS RECOMMENDED TO EXCLUDE A RENAL NEOPLASM.    2/11/14 x-ray tib-fib:  No obvious right tibial or fibular fracture is noted. No worrisome abnormality is noted. A linear metallic density is noted on the lateral view overlying the forefoot dorsally near the navicular cuneiform articulation. This could relate to a foreign body or overlying density relating to clothing. Please clinically correlate a small quadriceps patellar spur is noted.    10/1/15 CT lumbar spine  T12/L1: Mild disk bulging is evident and degenerative facet changes are noted at this level. The canal and  foramina are grossly intact.  L1/L2: Mild annular disk bulge and mild degenerative facet changes produce only minimal canal and foraminal distortion to  L2/L3: Degenerative facet changes noted in the mild annular disk and facet disease.  L3/L4: Posterior canal ligamentous hypertrophy and degenerative facet changes are present. There is moderate annular disk bulging. This combines to produce mild canal and left greater than right foraminal narrowing. This is slightly worsened from the previous exam.  L4/L5: The canal is grossly intact as the surgical level. Degenerative facet changes are noted bilaterally.  L5/S1: Moderate degenerative facet changes noted bilaterally and there is suggestion of disk bulging with left greater than right foraminal narrowing.    11/16/16 MRI lumbar spine with and without contrast  There have been prior laminectomies at L4 and L5, with interbody spacer present at L4-5.  Vertebral body height and alignment are within normal limits.  Marrow signal is heterogeneous.  There is moderate marrow enhancement within the L4 and L5 vertebral bodies centered about the disc space which is likely reactive.  The conus terminates at T12-L1.  Disc desiccation is present throughout the lumbar spine.  Mild loss of disc height is present at the L5-S1 level.  L1-L2:  No disc herniation. No spinal canal or neuroforaminal narrowing.  L2-L3:  No disc herniation. No spinal canal or neuroforaminal narrowing.  L3-L4:  There is mild diffuse disc bulging and moderate facet arthropathy with significant thickening of the ligament of flavum.  The findings contribute to mild spinal canal narrowing without significant neuroforaminal narrowing.  L4-L5:  Moderate bilateral neuroforaminal narrowing is present which appears predominantly due to facet arthropathy, with some contribution from enhancing scar tissue formation within the neuroforamen on the right.  No spinal canal narrowing.  L5-S1:  There is mild broad-based  posterior bulging of the disc, abdomen and moderate bilateral facet arthropathy.  There is resultant mild bilateral neuroforaminal narrowing.      Assessment:  The patient is a 67year-old Female with a history of colon CA, sleep apnea, anemia, depression and postlaminectomy syndrome now with myelodysplastic syndrome who presents in referral from Dr. Villanueva.  1. Chronic pain associated with significant psychosocial dysfunction     2. Lumbar radiculitis         Plan:  1.  I removed the spinal cord stimulator leads after cleaning with sterile prep and cutting sutures.  The leads were removed intact and bandages were applied to the site.  2.  She stated that since she did so well she would like to move forward with implantation of the device.  I've had her sign informed consent we discussed risks and we will schedule her.  I'm going to have her use a chlorhexidine wash the night before and the morning of the procedure.  She will follow-up in one week after the procedure.

## 2017-09-29 ENCOUNTER — OUTPATIENT CASE MANAGEMENT (OUTPATIENT)
Dept: ADMINISTRATIVE | Facility: OTHER | Age: 67
End: 2017-09-29

## 2017-09-29 NOTE — PROGRESS NOTES
The following patient has been assigned to Pelon Ram RN with [facility] for high risk screening.    Reason: High Risk    Please contact [facility] at [phone] with any questions.    Thank you,  Daylin Persaud

## 2017-10-02 ENCOUNTER — OFFICE VISIT (OUTPATIENT)
Dept: PSYCHIATRY | Facility: CLINIC | Age: 67
End: 2017-10-02
Payer: MEDICARE

## 2017-10-02 DIAGNOSIS — G89.4 CHRONIC PAIN SYNDROME: ICD-10-CM

## 2017-10-02 DIAGNOSIS — F33.0 MDD (MAJOR DEPRESSIVE DISORDER), RECURRENT EPISODE, MILD: ICD-10-CM

## 2017-10-02 DIAGNOSIS — G47.09 OTHER INSOMNIA: ICD-10-CM

## 2017-10-02 DIAGNOSIS — F41.1 GAD (GENERALIZED ANXIETY DISORDER): ICD-10-CM

## 2017-10-02 DIAGNOSIS — F33.0 MAJOR DEPRESSIVE DISORDER, RECURRENT EPISODE, MILD: Primary | ICD-10-CM

## 2017-10-02 DIAGNOSIS — F41.1 GENERALIZED ANXIETY DISORDER: ICD-10-CM

## 2017-10-02 PROCEDURE — 99499 UNLISTED E&M SERVICE: CPT | Mod: S$PBB,,, | Performed by: PSYCHIATRY & NEUROLOGY

## 2017-10-02 PROCEDURE — 99211 OFF/OP EST MAY X REQ PHY/QHP: CPT | Mod: PBBFAC | Performed by: PSYCHIATRY & NEUROLOGY

## 2017-10-02 PROCEDURE — 99999 PR PBB SHADOW E&M-EST. PATIENT-LVL I: CPT | Mod: PBBFAC,,, | Performed by: PSYCHIATRY & NEUROLOGY

## 2017-10-02 PROCEDURE — 99214 OFFICE O/P EST MOD 30 MIN: CPT | Mod: S$PBB,,, | Performed by: PSYCHIATRY & NEUROLOGY

## 2017-10-02 RX ORDER — FLUOXETINE HYDROCHLORIDE 40 MG/1
40 CAPSULE ORAL EVERY MORNING
Qty: 90 CAPSULE | Refills: 3 | Status: SHIPPED | OUTPATIENT
Start: 2017-10-02 | End: 2018-07-18 | Stop reason: SDUPTHER

## 2017-10-02 RX ORDER — BUPROPION HYDROCHLORIDE 300 MG/1
300 TABLET ORAL EVERY MORNING
Qty: 90 TABLET | Refills: 3 | Status: SHIPPED | OUTPATIENT
Start: 2017-10-02 | End: 2018-11-01 | Stop reason: SDUPTHER

## 2017-10-02 RX ORDER — TRAZODONE HYDROCHLORIDE 100 MG/1
TABLET ORAL
Qty: 180 TABLET | Refills: 3 | Status: SHIPPED | OUTPATIENT
Start: 2017-10-02 | End: 2018-12-03 | Stop reason: SDUPTHER
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
## 2017-10-05 ENCOUNTER — OUTPATIENT CASE MANAGEMENT (OUTPATIENT)
Dept: ADMINISTRATIVE | Facility: OTHER | Age: 67
End: 2017-10-05

## 2017-10-05 DIAGNOSIS — K21.9 GASTROESOPHAGEAL REFLUX DISEASE, ESOPHAGITIS PRESENCE NOT SPECIFIED: ICD-10-CM
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
## 2017-10-06 RX ORDER — PANTOPRAZOLE SODIUM 40 MG/1
TABLET, DELAYED RELEASE ORAL
Qty: 30 TABLET | Refills: 0 | Status: SHIPPED | OUTPATIENT
Start: 2017-10-06 | End: 2018-06-24 | Stop reason: SDUPTHER

## 2017-10-06 RX ORDER — PANTOPRAZOLE SODIUM 40 MG/1
TABLET, DELAYED RELEASE ORAL
Qty: 30 TABLET | Refills: 0 | Status: SHIPPED | OUTPATIENT
Start: 2017-10-06 | End: 2018-02-27 | Stop reason: SDUPTHER

## 2017-10-06 RX ORDER — PANTOPRAZOLE SODIUM 40 MG/1
TABLET, DELAYED RELEASE ORAL
Qty: 90 TABLET | Refills: 4 | Status: SHIPPED | OUTPATIENT
Start: 2017-10-06 | End: 2018-02-27 | Stop reason: SDUPTHER

## 2017-10-09 NOTE — PATIENT INSTRUCTIONS
Back Pain (Acute or Chronic)    Back pain is one of the most common problems. The good news is that most people feel better in 1 to 2 weeks, and most of the rest in 1 to 2 months. Most people can remain active.  People experience and describe pain differently; not everyone is the same.  · The pain can be sharp, stabbing, shooting, aching, cramping or burning.  · Movement, standing, bending, lifting, sitting, or walking may worsen pain.  · It can be localized to one spot or area, or it can be more generalized.  · It can spread or radiate upwards, to the front, or go down your arms or legs (sciatica).  · It can cause muscle spasm.  Most of the time, mechanical problems with the muscles or spine cause the pain. Mechanical problems are usually caused by an injury to the muscles or ligaments. While illness can cause back pain, it is usually not caused by a serious illness. Mechanical problems include:   · Physical activity such as sports, exercise, work, or normal activity  · Overexertion, lifting, pushing, pulling incorrectly or too aggressively  · Sudden twisting, bending, or stretching from an accident, or accidental movement  · Poor posture  · Stretching or moving wrong, without noticing pain at the time  · Poor coordination, lack of regular exercise (check with your doctor about this)  · Spinal disc disease or arthritis  · Stress  Pain can also be related to pregnancy, or illness like appendicitis, bladder or kidney infections, pelvic infections, and many other things.  Acute back pain usually gets better in 1 to 2 weeks. Back pain related to disk disease, arthritis in the spinal joints or spinal stenosis (narrowing of the spinal canal) can become chronic and last for months or years.  Unless you had a physical injury (for example, a car accident or fall) X-rays are usually not needed for the initial evaluation of back pain. If pain continues and does not respond to medical treatment, X-rays and other tests may be  needed.  Home care  Try these home care recommendations:  · When in bed, try to find a position of comfort. A firm mattress is best. Try lying flat on your back with pillows under your knees. You can also try lying on your side with your knees bent up towards your chest and a pillow between your knees.  · At first, do not try to stretch out the sore spots. If there is a strain, it is not like the good soreness you get after exercising without an injury. In this case, stretching may make it worse.  · Avoid prolong sitting, long car rides, or travel. This puts more stress on the lower back than standing or walking.  · During the first 24 to 72 hours after an acute injury or flare up of chronic back pain, apply an ice pack to the painful area for 20 minutes and then remove it for 20 minutes. Do this over a period of 60 to 90 minutes or several times a day. This will reduce swelling and pain. Wrap the ice pack in a thin towel or plastic to protect your skin.  · You can start with ice, then switch to heat. Heat (hot shower, hot bath, or heating pad) reduces pain and works well for muscle spasms. Heat can be applied to the painful area for 20 minutes then remove it for 20 minutes. Do this over a period of 60 to 90 minutes or several times a day. Do not sleep on a heating pad. It can lead to skin burns or tissue damage.  · You can alternate ice and heat therapy. Talk with your doctor about the best treatment for your back pain.  · Therapeutic massage can help relax the back muscles without stretching them.  · Be aware of safe lifting methods and do not lift anything without stretching first.  Medicines  Talk to your doctor before using medicine, especially if you have other medical problems or are taking other medicines.  · You may use over-the-counter medicine as directed on the bottle to control pain, unless another pain medicine was prescribed. If you have chronic conditions like diabetes, liver or kidney disease,  stomach ulcers, or gastrointestinal bleeding, or are taking blood thinners, talk to your doctor before taking any medicine.  · Be careful if you are given a prescription medicines, narcotics, or medicine for muscle spasms. They can cause drowsiness, affect your coordination, reflexes, and judgement. Do not drive or operate heavy machinery.  Follow-up care  Follow up with your healthcare provider, or as advised.   A radiologist will review any X-rays that were taken. Your provide will notify you of any new findings that may affect your care.  Call 911  Call emergency services if any of the following occur:  · Trouble breathing  · Confusion  · Very drowsy or trouble awakening  · Fainting or loss of consciousness  · Rapid or very slow heart rate  · Loss of bowel or bladder control  When to seek medical advice  Call your healthcare provider right away if any of these occur:   · Pain becomes worse or spreads to your legs  · Weakness or numbness in one or both legs  · Numbness in the groin or genital area  Date Last Reviewed: 7/1/2016 © 2000-2017 [employer]. [address] [place] [address]. All rights reserved. This information is not intended as a substitute for professional medical care. Always follow your healthcare professional's instructions.        Possible Causes of Low Back or Leg Pain    The symptoms in your back or leg may be due to pressure on a nerve. This pressure may be caused by a damaged disk or by abnormal bone growth. Either way, you may feel pain, burning, tingling, or numbness. If you have pressure on a nerve that connects to the sciatic nerve, pain may shoot down your leg.    Pressure from the disk  Constant wear and tear can weaken a disk over time and cause back pain. The disk can then be damaged by a sudden movement or injury. If its soft center begins to bulge, the disk may press on a nerve. Or the outside of the disk may tear, and the soft center may squeeze through and  pinch a nerve.    Pressure from bone  As a disk wears out, the vertebrae right above and below the disk begin to touch. This can put pressure on a nerve. Often, abnormal bone (called bone spurs) grows where the vertebrae rub against each other. This can cause the foramen or the spinal canal to narrow (called stenosis) and press against a nerve.  Date Last Reviewed: 10/4/2015  © 3628-3689 [employer]. [address] [place] [address], [place] [address]. All rights reserved. This information is not intended as a substitute for professional medical care. Always follow your healthcare professional's instructions.        Complementary Care for Pain  You may find pain relief with complementary care. Look for a licensed or certified professional. And always tell your healthcare professional that you are using complementary care.    Massage  Massage can increase circulation and relaxation. This can help relieve stress and pain.  Biofeedback  Biofeedback uses instruments to measure the body's physiological activity, like heart rate and muscle activity. This information is used to help you learn to control certain functions, such as relaxing muscles and helping reduce pain.   Chiropractic  Chiropractic adjusts the spine and joints. It may help reduce back, neck, or joint pain. Chiropractic may also use mild electrical stimulation, massage, heat, or ultrasound (sound waves).  Acupuncture  Acupuncture uses thin needles to help treat pain. The treatment may release the bodys own painkillers.  Distraction  Distraction helps you focus on something besides pain. Try reading a book, watching a movie, or talking with family. Or visit a local attraction.  Meditation  Meditation helps you focus on words, objects, or ideas. Doing this can calm you and decrease stress.  Relaxation  Relaxation includes methods like listening to soothing music or relaxation tapes. You might try slow, deep breathing. Imagine a calm scene, like an ocean  or mountain, as you breathe.  Date Last Reviewed: 5/1/2017  © 7153-0092 [employer]. [address] [place] [address], [place] [address]. All rights reserved. This information is not intended as a substitute for professional medical care. Always follow your healthcare professional's instructions.        Spinal Cord Stimulation    Pain messages travel over nerve pathways to the spinal cord, inside the spine. The spinal cord carries the messages to the brain. Constant pain messages can cause long-term pain that is hard to treat. This is known as chronic pain. Spinal cord stimulation uses a medical device to send signals to the nerve pathways inside your bre cord. These signals help block the pain.  Keeping a pain log  Your doctor may ask you to keep a pain log for a certain amount of time. In it, you may answer certain questions: When do you feel pain? What does it feel like? How long does the pain last? What makes it better or worse? When the stimulation is on, is your pain relieved? Your answers help show how well spinal cord stimulation may work for you. Your doctor will give you guidelines for your pain log. During the time you write the log, you may not be able to take pain medications. Be sure to discuss this with your doctor.  Spinal cord stimulation may help  Spinal cord stimulation is one treatment for chronic pain. Certain criteria need to be met to be a good candidate for spinal cord stimulation. A small medical device sends signals to your spinal cord. These signals keep the chronic pain messages from being sent to your brain. Instead, you may feel tingling from the electrical signals. A trial stimulator that is worn outside the body in tried first to see if it will work. If it does, the permanent stimulator system may be used. This device can be removed at any time.  The stimulator system  The stimulator system has several parts. A power source makes the signals. This power source may be worn  outside the body or implanted under the skin on your abdomen or buttocks. One or more leads (flexible, plastic-covered wires or paddle) are placed inside the body to carry the signals to the spinal cord. Your doctor can explain the system youll be using in more detail.  Risks and possible complications  · Infection  · Bleeding  · Nerve damage  · Spinal cord damage  · Failure to relieve pain   Date Last Reviewed: 10/19/2015  © 3401-2008 [employer]. [address] [place] [address], [place] [address]. All rights reserved. This information is not intended as a substitute for professional medical care. Always follow your healthcare professional's instructions.

## 2017-10-15 ENCOUNTER — TELEPHONE (OUTPATIENT)
Dept: FAMILY MEDICINE | Facility: CLINIC | Age: 67
End: 2017-10-15

## 2017-10-15 RX ORDER — LEVOTHYROXINE SODIUM 100 UG/1
TABLET ORAL
Qty: 90 TABLET | Refills: 1 | Status: SHIPPED | OUTPATIENT
Start: 2017-10-15 | End: 2018-04-19 | Stop reason: SDUPTHER

## 2017-10-17 DIAGNOSIS — M51.36 DDD (DEGENERATIVE DISC DISEASE), LUMBAR: Primary | ICD-10-CM

## 2017-10-18 ENCOUNTER — PATIENT MESSAGE (OUTPATIENT)
Dept: SURGERY | Facility: HOSPITAL | Age: 67
End: 2017-10-18

## 2017-10-19 ENCOUNTER — OUTPATIENT CASE MANAGEMENT (OUTPATIENT)
Dept: ADMINISTRATIVE | Facility: OTHER | Age: 67
End: 2017-10-19

## 2017-10-19 NOTE — TELEPHONE ENCOUNTER
Spoke with pt and she stated she has been feeling dizzy for the last 6 months, but for the last week the dizziness spells have become much more frequent.  Pt states that when it gets really bad, she feels like she is going to pass out.  Please advise.

## 2017-10-19 NOTE — TELEPHONE ENCOUNTER
Spoke with pt to try and schedule appt.  Pt stated she is having a back procedure next week and would like to call us to make an appt after that procedure.  Advised pt that if she starts to have any SOB or CP with dizziness, or any changes she should go to Er.  Pt expressed understanding and stated she would call us back with any further questions or concerns.

## 2017-10-19 NOTE — PROGRESS NOTES
10/19/17-RN CM called patient for follow up. Inquired about receipt of resource information from RN CM, which patient stated that she is pretty sure that she did receive the info but has a stack of mail to go through as she has been out of town. Med rec Completed with patient. Patient reported to RN CM that she has been experiencing frequent dizzy that is occurring more often than her norm. She stated that the dizziness will occur when she is walking for a distance and is happening several times a day. Stated that this is concerning to her and she is looking for additional advice. RN CM will message PCP requesting patient contact for additional advice. Patient reported that she was at the grocery at this time with her spouse. Will follow up in 2 weeks.       Follow Up Plan:  --Complete med rec-DONE  --Did patient receive resource materials from RN CM?

## 2017-10-19 NOTE — TELEPHONE ENCOUNTER
Needs appointment, if CP, SOB, syncope then er  If just dizzy ok to see NP if no provider openings available right away

## 2017-10-19 NOTE — TELEPHONE ENCOUNTER
----- Message from Pelon Ram RN sent at 10/19/2017 11:23 AM CDT -----  Contact: Pelon Ram RN Outpatient Case Management  Good Morning,     Mrs. Whalen reported to me that she has been experiencing dizziness recently that has increased in frequency. She reported that it is happening when she is trying to walk for a distance and is happening several times a day. This is concerning to the patient and so would like some additional advice. Can you please contact the patient for assistance?      Thank you!  Pelon

## 2017-10-20 ENCOUNTER — ANESTHESIA EVENT (OUTPATIENT)
Dept: SURGERY | Facility: HOSPITAL | Age: 67
End: 2017-10-20
Payer: MEDICARE

## 2017-10-23 ENCOUNTER — HOSPITAL ENCOUNTER (OUTPATIENT)
Facility: HOSPITAL | Age: 67
Discharge: HOME | End: 2017-10-23
Attending: ANESTHESIOLOGY | Admitting: ANESTHESIOLOGY
Payer: MEDICARE

## 2017-10-23 ENCOUNTER — HOSPITAL ENCOUNTER (OUTPATIENT)
Dept: RADIOLOGY | Facility: HOSPITAL | Age: 67
Discharge: HOME | End: 2017-10-23
Attending: ANESTHESIOLOGY | Admitting: ANESTHESIOLOGY
Payer: MEDICARE

## 2017-10-23 ENCOUNTER — SURGERY (OUTPATIENT)
Age: 67
End: 2017-10-23

## 2017-10-23 ENCOUNTER — ANESTHESIA (OUTPATIENT)
Dept: SURGERY | Facility: HOSPITAL | Age: 67
End: 2017-10-23
Payer: MEDICARE

## 2017-10-23 VITALS
RESPIRATION RATE: 16 BRPM | SYSTOLIC BLOOD PRESSURE: 139 MMHG | OXYGEN SATURATION: 98 % | WEIGHT: 195 LBS | TEMPERATURE: 98 F | BODY MASS INDEX: 30.61 KG/M2 | DIASTOLIC BLOOD PRESSURE: 71 MMHG | HEIGHT: 67 IN | HEART RATE: 66 BPM

## 2017-10-23 DIAGNOSIS — G89.4 CHRONIC PAIN ASSOCIATED WITH SIGNIFICANT PSYCHOSOCIAL DYSFUNCTION: Primary | ICD-10-CM

## 2017-10-23 DIAGNOSIS — M54.16 LUMBAR RADICULOPATHY: ICD-10-CM

## 2017-10-23 DIAGNOSIS — M51.36 DDD (DEGENERATIVE DISC DISEASE), LUMBAR: ICD-10-CM

## 2017-10-23 DIAGNOSIS — M54.16 LUMBAR RADICULITIS: ICD-10-CM

## 2017-10-23 PROCEDURE — 63685 INS/RPLC SPI NPG/RCVR POCKET: CPT | Mod: 59,,, | Performed by: ANESTHESIOLOGY

## 2017-10-23 PROCEDURE — 95971 ALYS SMPL SP/PN NPGT W/PRGRM: CPT | Mod: 59,,, | Performed by: ANESTHESIOLOGY

## 2017-10-23 PROCEDURE — 37000009 HC ANESTHESIA EA ADD 15 MINS: Mod: PO | Performed by: ANESTHESIOLOGY

## 2017-10-23 PROCEDURE — 25000003 PHARM REV CODE 250: Mod: PO | Performed by: ANESTHESIOLOGY

## 2017-10-23 PROCEDURE — 36000706: Mod: PO | Performed by: ANESTHESIOLOGY

## 2017-10-23 PROCEDURE — 63650 IMPLANT NEUROELECTRODES: CPT | Mod: ,,, | Performed by: ANESTHESIOLOGY

## 2017-10-23 PROCEDURE — C1787 PATIENT PROGR, NEUROSTIM: HCPCS | Mod: PO | Performed by: ANESTHESIOLOGY

## 2017-10-23 PROCEDURE — 63600175 PHARM REV CODE 636 W HCPCS: Mod: PO | Performed by: NURSE ANESTHETIST, CERTIFIED REGISTERED

## 2017-10-23 PROCEDURE — 71000033 HC RECOVERY, INTIAL HOUR: Mod: PO | Performed by: ANESTHESIOLOGY

## 2017-10-23 PROCEDURE — 36000707: Mod: PO | Performed by: ANESTHESIOLOGY

## 2017-10-23 PROCEDURE — D9220A PRA ANESTHESIA: Mod: ANES,,, | Performed by: ANESTHESIOLOGY

## 2017-10-23 PROCEDURE — 27201423 OPTIME MED/SURG SUP & DEVICES STERILE SUPPLY: Mod: PO | Performed by: ANESTHESIOLOGY

## 2017-10-23 PROCEDURE — 27800903 OPTIME MED/SURG SUP & DEVICES OTHER IMPLANTS: Mod: PO | Performed by: ANESTHESIOLOGY

## 2017-10-23 PROCEDURE — D9220A PRA ANESTHESIA: Mod: CRNA,,, | Performed by: NURSE ANESTHETIST, CERTIFIED REGISTERED

## 2017-10-23 PROCEDURE — 25000003 PHARM REV CODE 250: Mod: PO | Performed by: NURSE ANESTHETIST, CERTIFIED REGISTERED

## 2017-10-23 PROCEDURE — C1822 GEN, NEURO, HF, RECHG BAT: HCPCS | Mod: PO | Performed by: ANESTHESIOLOGY

## 2017-10-23 PROCEDURE — C1778 LEAD, NEUROSTIMULATOR: HCPCS | Mod: PO | Performed by: ANESTHESIOLOGY

## 2017-10-23 PROCEDURE — 76000 FLUOROSCOPY <1 HR PHYS/QHP: CPT | Mod: TC,PO

## 2017-10-23 PROCEDURE — 37000008 HC ANESTHESIA 1ST 15 MINUTES: Mod: PO | Performed by: ANESTHESIOLOGY

## 2017-10-23 DEVICE — KIT PATIENT REMOTE: Type: IMPLANTABLE DEVICE | Site: BACK | Status: FUNCTIONAL

## 2017-10-23 DEVICE — KIT LEAD ANCHOR N3000: Type: IMPLANTABLE DEVICE | Site: BACK | Status: FUNCTIONAL

## 2017-10-23 DEVICE — KIT LEAD 70CM: Type: IMPLANTABLE DEVICE | Site: BACK | Status: FUNCTIONAL

## 2017-10-23 DEVICE — IPG SENZA 1500: Type: IMPLANTABLE DEVICE | Site: BACK | Status: FUNCTIONAL

## 2017-10-23 DEVICE — KIT CHARGER: Type: IMPLANTABLE DEVICE | Site: BACK | Status: FUNCTIONAL

## 2017-10-23 RX ORDER — MIDAZOLAM HYDROCHLORIDE 1 MG/ML
INJECTION, SOLUTION INTRAMUSCULAR; INTRAVENOUS
Status: DISCONTINUED | OUTPATIENT
Start: 2017-10-23 | End: 2017-10-23

## 2017-10-23 RX ORDER — SODIUM CHLORIDE 0.9 % (FLUSH) 0.9 %
3 SYRINGE (ML) INJECTION
Status: DISCONTINUED | OUTPATIENT
Start: 2017-10-23 | End: 2017-10-23 | Stop reason: HOSPADM

## 2017-10-23 RX ORDER — FENTANYL CITRATE 50 UG/ML
25 INJECTION, SOLUTION INTRAMUSCULAR; INTRAVENOUS EVERY 5 MIN PRN
Status: DISCONTINUED | OUTPATIENT
Start: 2017-10-23 | End: 2017-10-23 | Stop reason: HOSPADM

## 2017-10-23 RX ORDER — CEFAZOLIN SODIUM 2 G/50ML
2 SOLUTION INTRAVENOUS
Status: DISCONTINUED | OUTPATIENT
Start: 2017-10-23 | End: 2017-10-23 | Stop reason: HOSPADM

## 2017-10-23 RX ORDER — LIDOCAINE HYDROCHLORIDE AND EPINEPHRINE 10; 10 MG/ML; UG/ML
INJECTION, SOLUTION INFILTRATION; PERINEURAL
Status: DISCONTINUED | OUTPATIENT
Start: 2017-10-23 | End: 2017-10-23 | Stop reason: HOSPADM

## 2017-10-23 RX ORDER — PROPOFOL 10 MG/ML
VIAL (ML) INTRAVENOUS CONTINUOUS PRN
Status: DISCONTINUED | OUTPATIENT
Start: 2017-10-23 | End: 2017-10-23

## 2017-10-23 RX ORDER — SODIUM CHLORIDE, SODIUM LACTATE, POTASSIUM CHLORIDE, CALCIUM CHLORIDE 600; 310; 30; 20 MG/100ML; MG/100ML; MG/100ML; MG/100ML
INJECTION, SOLUTION INTRAVENOUS CONTINUOUS
Status: DISCONTINUED | OUTPATIENT
Start: 2017-10-23 | End: 2017-10-23

## 2017-10-23 RX ORDER — OXYCODONE AND ACETAMINOPHEN 10; 325 MG/1; MG/1
1 TABLET ORAL EVERY 6 HOURS PRN
Qty: 40 TABLET | Refills: 0 | Status: SHIPPED | OUTPATIENT
Start: 2017-10-23 | End: 2020-02-24

## 2017-10-23 RX ORDER — OXYCODONE HYDROCHLORIDE 5 MG/1
5 TABLET ORAL ONCE AS NEEDED
Status: COMPLETED | OUTPATIENT
Start: 2017-10-23 | End: 2017-10-23

## 2017-10-23 RX ORDER — LIDOCAINE HCL/PF 100 MG/5ML
SYRINGE (ML) INTRAVENOUS
Status: DISCONTINUED | OUTPATIENT
Start: 2017-10-23 | End: 2017-10-23

## 2017-10-23 RX ORDER — KETAMINE HYDROCHLORIDE 100 MG/ML
INJECTION, SOLUTION INTRAMUSCULAR; INTRAVENOUS
Status: DISCONTINUED | OUTPATIENT
Start: 2017-10-23 | End: 2017-10-23

## 2017-10-23 RX ORDER — BACITRACIN 50000 [IU]/1
INJECTION, POWDER, FOR SOLUTION INTRAMUSCULAR
Status: DISCONTINUED | OUTPATIENT
Start: 2017-10-23 | End: 2017-10-23 | Stop reason: HOSPADM

## 2017-10-23 RX ORDER — MIDAZOLAM HYDROCHLORIDE 5 MG/ML
4 INJECTION INTRAMUSCULAR; INTRAVENOUS ONCE
Status: DISCONTINUED | OUTPATIENT
Start: 2017-10-23 | End: 2017-10-23 | Stop reason: HOSPADM

## 2017-10-23 RX ORDER — FENTANYL CITRATE 50 UG/ML
INJECTION, SOLUTION INTRAMUSCULAR; INTRAVENOUS
Status: DISCONTINUED | OUTPATIENT
Start: 2017-10-23 | End: 2017-10-23

## 2017-10-23 RX ORDER — LIDOCAINE HYDROCHLORIDE 10 MG/ML
1 INJECTION, SOLUTION EPIDURAL; INFILTRATION; INTRACAUDAL; PERINEURAL ONCE
Status: DISCONTINUED | OUTPATIENT
Start: 2017-10-23 | End: 2017-10-23 | Stop reason: HOSPADM

## 2017-10-23 RX ORDER — BUPIVACAINE HYDROCHLORIDE 2.5 MG/ML
INJECTION, SOLUTION EPIDURAL; INFILTRATION; INTRACAUDAL
Status: DISCONTINUED | OUTPATIENT
Start: 2017-10-23 | End: 2017-10-23 | Stop reason: HOSPADM

## 2017-10-23 RX ORDER — CEFAZOLIN SODIUM 1 G/3ML
INJECTION, POWDER, FOR SOLUTION INTRAMUSCULAR; INTRAVENOUS
Status: DISCONTINUED | OUTPATIENT
Start: 2017-10-23 | End: 2017-10-23

## 2017-10-23 RX ORDER — SODIUM CHLORIDE, SODIUM LACTATE, POTASSIUM CHLORIDE, CALCIUM CHLORIDE 600; 310; 30; 20 MG/100ML; MG/100ML; MG/100ML; MG/100ML
INJECTION, SOLUTION INTRAVENOUS CONTINUOUS
Status: DISCONTINUED | OUTPATIENT
Start: 2017-10-23 | End: 2017-10-23 | Stop reason: HOSPADM

## 2017-10-23 RX ADMIN — MIDAZOLAM HYDROCHLORIDE 2 MG: 1 INJECTION, SOLUTION INTRAMUSCULAR; INTRAVENOUS at 10:10

## 2017-10-23 RX ADMIN — CEFAZOLIN 2 G: 1 INJECTION, POWDER, FOR SOLUTION INTRAVENOUS at 10:10

## 2017-10-23 RX ADMIN — KETAMINE HYDROCHLORIDE 25 MG: 100 INJECTION, SOLUTION, CONCENTRATE INTRAMUSCULAR; INTRAVENOUS at 11:10

## 2017-10-23 RX ADMIN — OXYCODONE HYDROCHLORIDE 5 MG: 5 TABLET ORAL at 12:10

## 2017-10-23 RX ADMIN — BACITRACIN 50000 UNITS: 5000 INJECTION, POWDER, FOR SOLUTION INTRAMUSCULAR at 11:10

## 2017-10-23 RX ADMIN — FENTANYL CITRATE 25 MCG: 50 INJECTION, SOLUTION INTRAMUSCULAR; INTRAVENOUS at 10:10

## 2017-10-23 RX ADMIN — KETAMINE HYDROCHLORIDE 25 MG: 100 INJECTION, SOLUTION, CONCENTRATE INTRAMUSCULAR; INTRAVENOUS at 10:10

## 2017-10-23 RX ADMIN — PROPOFOL 75 MCG/KG/MIN: 10 INJECTION, EMULSION INTRAVENOUS at 10:10

## 2017-10-23 RX ADMIN — BUPIVACAINE HYDROCHLORIDE 10 ML: 2.5 INJECTION, SOLUTION EPIDURAL; INFILTRATION; INTRACAUDAL; PERINEURAL at 11:10

## 2017-10-23 RX ADMIN — LIDOCAINE HYDROCHLORIDE 100 MG: 20 INJECTION PARENTERAL at 10:10

## 2017-10-23 RX ADMIN — SODIUM CHLORIDE, SODIUM LACTATE, POTASSIUM CHLORIDE, AND CALCIUM CHLORIDE: .6; .31; .03; .02 INJECTION, SOLUTION INTRAVENOUS at 08:10

## 2017-10-23 RX ADMIN — FENTANYL CITRATE 50 MCG: 50 INJECTION, SOLUTION INTRAMUSCULAR; INTRAVENOUS at 10:10

## 2017-10-23 RX ADMIN — LIDOCAINE HYDROCHLORIDE AND EPINEPHRINE 20 ML: 10; 10 INJECTION, SOLUTION INFILTRATION; PERINEURAL at 11:10

## 2017-10-23 NOTE — DISCHARGE INSTRUCTIONS
Recovery After Procedural Sedation (Adult)  You have been given medicine by vein to make you sleep during your surgery. This may have included both a pain medicine and sleeping medicine. Most of the effects have worn off. But you may still have some drowsiness for the next 6 to 8 hours.  Home care  Follow these guidelines when you get home:  · For the next 8 hours, you should be watched by a responsible adult. This person should make sure your condition is not getting worse.  · Don't drink any alcohol for the next 24 hours.  · Don't drive, operate dangerous machinery, or make important business or personal decisions during the next 24 hours.  Note: Your healthcare provider may tell you not to take any medicine by mouth for pain or sleep in the next 4 hours. These medicines may react with the medicines you were given in the hospital. This could cause a much stronger response than usual.  Follow-up care  Follow up with your healthcare provider if you are not alert and back to your usual level of activity within 12 hours.  When to seek medical advice  Call your healthcare provider right away if any of these occur:  · Drowsiness gets worse  · Weakness or dizziness gets worse  · Repeated vomiting  · You can't be awakened   Date Last Reviewed: 10/18/2016  © 7705-2579 [employer]. [address] [place] [address], [place] [address]. All rights reserved. This information is not intended as a substitute for professional medical care. Always follow your healthcare professional's instructions.        SPINAL CORD STIMULATOR   Please follow all of the instructions that were given to you and demonstrated by your  Representative about the use and care of your remote and equipment.    Please contact your representative for any questions about restrictions or to offer any support you may need on the care and operation of your spinal cord stimulator device.     After your procedure:    -Sponge baths only .  -Keep your bandage  clean and dry.  -Do not remove your dressing until your follow up visit.   -Please limit any necessary bending, lifting or twisting to slow and careful movements    for the next 6 weeks or as directed by your doctor.  -During these 6 weeks, also avoid overhead work. Keep your elbows below your  shoulders.  -During these 6 weeks, wear your brace when you are walking or active. You do not have to wear the brace  while you are in bed.  -Turn off your stimulator when driving. (Do not drive for the first 24 hours after your  procedure)      -You may resume your home medications as prescribed.  -You may resume your normal diet as tolerated.  -Follow up as scheduled with Dr. Samuels.       For programming or any questions you may have about your spinal cord stimulator, please call your  Representative:

## 2017-10-23 NOTE — ANESTHESIA POSTPROCEDURE EVALUATION
"Anesthesia Post Evaluation    Patient: Charmaine Whalen    Procedure(s) Performed: Procedure(s) (LRB):  INSERTION-STIMULATOR-DORSAL COLUMN (N/A)    Final Anesthesia Type: MAC  Patient location during evaluation: PACU  Patient participation: Yes- Able to Participate  Level of consciousness: awake and alert  Post-procedure vital signs: reviewed and stable  Pain management: adequate  Airway patency: patent  PONV status at discharge: No PONV  Anesthetic complications: no      Cardiovascular status: blood pressure returned to baseline  Respiratory status: unassisted  Hydration status: euvolemic  Follow-up not needed.        Visit Vitals  /71 (BP Location: Left arm, Patient Position: Sitting)   Pulse 66   Temp 36.5 °C (97.7 °F) (Skin)   Resp 16   Ht 5' 7" (1.702 m)   Wt 88.5 kg (195 lb)   SpO2 98%   Breastfeeding? No   BMI 30.54 kg/m²       Pain/Ray Score: Pain Assessment Performed: Yes (10/23/2017 12:46 PM)  Presence of Pain: complains of pain/discomfort (10/23/2017 12:46 PM)  Pain Rating Prior to Med Admin: 5 (10/23/2017 12:46 PM)  Ray Score: 10 (10/23/2017 12:38 PM)      "

## 2017-10-23 NOTE — DISCHARGE SUMMARY
[facility]  Discharge Note  Short Stay    Admit Date: 10/23/2017    Discharge Date: 10/23/2017    Attending Physician: Adan Samuels MD     Discharge Provider: Adan Samuels    Diagnoses:  Active Hospital Problems    Diagnosis  POA    *Chronic pain associated with significant psychosocial dysfunction [G89.4]  Yes      Resolved Hospital Problems    Diagnosis Date Resolved POA   No resolved problems to display.       Discharged Condition: good    Final Diagnoses: Chronic pain associated with significant psychosocial dysfunction [G89.4]  Lumbar radiculitis [M54.16]    Disposition: Home or Self Care    Hospital Course: no complications, uneventful    Outcome of Hospitalization, Treatment, Procedure, or Surgery:  Patient was admitted for outpatient procedure. The patient underwent procedure without complications and are discharged home    Follow up/Patient Instructions:  Follow up as scheduled/Patient has received instructions and follow up date    Medications:  Continue previous medications      Discharge Procedure Orders  Diet general     Activity as tolerated     Call MD for:  temperature >100.4     Call MD for:  severe uncontrolled pain     Call MD for:  redness, tenderness, or signs of infection (pain, swelling, redness, odor or green/yellow discharge around incision site)     Call MD for:  severe persistent headache     No dressing needed           Discharge Procedure Orders (must include Diet, Follow-up, Activity):    Discharge Procedure Orders (must include Diet, Follow-up, Activity)  Diet general     Activity as tolerated     Call MD for:  temperature >100.4     Call MD for:  severe uncontrolled pain     Call MD for:  redness, tenderness, or signs of infection (pain, swelling, redness, odor or green/yellow discharge around incision site)     Call MD for:  severe persistent headache     No dressing needed

## 2017-10-23 NOTE — INTERVAL H&P NOTE
The patient has been examined and the H&P has been reviewed:    I concur with the findings and no changes have occurred since H&P was written.    Anesthesia/Surgery risks, benefits and alternative options discussed and understood by patient/family.          Active Hospital Problems    Diagnosis  POA    Chronic pain associated with significant psychosocial dysfunction [G89.4]  Yes      Resolved Hospital Problems    Diagnosis Date Resolved POA   No resolved problems to display.

## 2017-10-23 NOTE — ANESTHESIA PREPROCEDURE EVALUATION
10/23/2017  Charmaine Whalen is a 67 y.o., female.    Pre-op Assessment      I have reviewed the Medications.     Review of Systems  Anesthesia Hx:  No problems with previous Anesthesia    Social:  Non-Smoker, No Alcohol Use    Hematology/Oncology:        Hematology Comments: Myelodysplastic disodrer   Cardiovascular:   CAD (nonobstructive)  Dysrhythmias atrial fibrillation    Pulmonary:   Sleep Apnea    Renal/:  Renal/ Normal     Hepatic/GI:   GERD    Musculoskeletal:   Arthritis  Hx spinal fusion Spine Disorders: lumbar Degenerative disease and Chronic Pain    Endocrine:   Hypothyroidism    Psych:   anxiety depression          Physical Exam  General:  Obesity    Airway/Jaw/Neck:  Airway Findings: Mouth Opening: Normal General Airway Assessment: Adult  Mallampati: II  Jaw/Neck Findings:  Neck ROM: Extension Decreased, Mild       Chest/Lungs:  Chest/Lungs Findings: Clear to auscultation, Normal Respiratory Rate     Heart/Vascular:  Heart Findings: Rate: Normal  Rhythm: Regular Rhythm  Sounds: Normal  Heart murmur: negative Vascular Findings: Normal (No carotid bruits.)       Mental Status:  Mental Status Findings:  Cooperative, Alert and Oriented         Anesthesia Plan  Type of Anesthesia, risks & benefits discussed:  Anesthesia Type:  MAC  Patient's Preference:   Intra-op Monitoring Plan:   Intra-op Monitoring Plan Comments:   Post Op Pain Control Plan:   Post Op Pain Control Plan Comments:   Induction:    Beta Blocker:  Patient is not currently on a Beta-Blocker (No further documentation required).       Informed Consent: Patient understands risks and agrees with Anesthesia plan.  Questions answered. Anesthesia consent signed with patient.  ASA Score: 3     Day of Surgery Review of History & Physical:            Ready For Surgery From Anesthesia Perspective.

## 2017-10-23 NOTE — OP NOTE
PROCEDURE DATE: 10/23/2017    Spinal Cord Stimulator Implantation-[facility]  PROCEDURE:   1. Spinal Cord Stimulator leads placement x 2 and implant- 16 contact total  2. Pulse Generator Implantation under flouroscopy  3. Programming greater than 30 mins    Pre-op diagnosis: Chronic pain syndrome, lumbar radiculitis    Post-op diagnosis: Same    Surgeon: Dr. Adan Samuels    Assistant: none     Anesthesia: Monitored Anesthesia Care    Estimated Blood Loss: Minimal- <10ml    Urine Output: Not Measured    IV Fluids- See Anesthesia record    Complications: none    CONSENT: The risks, benefits, and options were discussed thoroughly with the patient. The patient's questions were answered. The patient understands the risk and benefits and wishes to proceed. Informed consent was obtained.     PROCEDURE NOTE:  Patient is s/p a successful trial of spinal cord stimulation and scheduled for a stage 2 implant. After obtaining informed consent, pre-op antibiotic was started 30 minutes prior to incision. Site of the implantable pulse generator was marked on the patients rightt side in the preoperative area. The patient was taken to the OR and placed in the prone position. The anesthesia provider throughout the case provided sedation and cardiopulmonary monitoring. The Patient's back was prepped with Duraprep and then draped in usual sterile fashion.     An AP fluoroscopic view was obtained to identify and adia the midline position of the spinous process. The skin was anesthetized with Lidocaine 1%. Skin incision with a No. 10 scalpel was made and local dissection done with electrocautery as necessary to facilitate access to the paraspinous fascia.     Using a paramedian approach, a Tuohy needle was entered into the right-side T12/L1 epidural space using a loss of resistance technique with 0.5cc of air. A similar approach was done on the left-side. Then, after negative aspiration of blood, CSF, or any paraesthesia, the SCS leads were  advanced to the top of T9 with the right lead and the middle of T9 with the left lead. The leads were attempted to pass to T8 but too much resistance was met.  Because this device does not produce paresthesias, intraoperative stimulation testing was not required.     Then, the Tuohy needles were removed under fluoroscopy and a lead anchor placed over the leads. Using 0-0 Ethibond sutures, leads were anchored to the fascia with 2 sutures for each lead. A relief loop was then placed. Then further local anesthetic was used at the IPG site with 1% lidocaine. Using a No. 10 scalpel, an incision was made over the right buttock and dissection carried out with Bovie and blunt dissection. After obtaining hemostasis, both the incisions were irrigated with antibiotic solutions. Using the tunneling tool, tunneling was completed between the midline and the IPG pocket. The leads were passed to the IPG and connected. Then the IPG was placed in the IPG pocket and system was checked and noted to be in adequate position. Copious antibiotic bulb lavage was irrigated throughout the field, and hemostasis was maintained.     Then the wound was closed with simple interrupted sutures using 0-0 vicryl sutures and then 2-0 vicryl sutures in 2 layers and the skin closed with 4-0 monocryl. Bacitracin was placed over the incision sites and dressings applied. Sponge and needle counts were correct x2 at conclusion of the case.     Patient was then placed supine on the stretcher and transferred to the recovery room. Patient was programmed by the representative of the SCS. Patient was instructed not to perform any abrupt movements with the back, avoid bending, twisting, or lifting >10lbs. The patient has been scheduled to return to the clinic in approx 7 days. The patient tolerated the procedure well.

## 2017-10-23 NOTE — TRANSFER OF CARE
"Anesthesia Transfer of Care Note    Patient: Charmaine Whalen    Procedure(s) Performed: Procedure(s) (LRB):  INSERTION-STIMULATOR-DORSAL COLUMN (N/A)    Patient location: PACU    Anesthesia Type: MAC    Transport from OR: Transported from OR on room air with adequate spontaneous ventilation    Post pain: adequate analgesia    Post assessment: no apparent anesthetic complications    Post vital signs: stable    Level of consciousness: awake    Nausea/Vomiting: no nausea/vomiting    Complications: none    Transfer of care protocol was followed      Last vitals:   Visit Vitals  /71 (BP Location: Right arm, Patient Position: Lying)   Pulse (!) 55   Temp 36.6 °C (97.9 °F) (Skin)   Resp 16   Ht 5' 7" (1.702 m)   Wt 88.5 kg (195 lb)   SpO2 98%   Breastfeeding? No   BMI 30.54 kg/m²     "

## 2017-10-30 ENCOUNTER — OFFICE VISIT (OUTPATIENT)
Dept: PAIN MEDICINE | Facility: CLINIC | Age: 67
End: 2017-10-30
Payer: MEDICARE

## 2017-10-30 VITALS
BODY MASS INDEX: 30.54 KG/M2 | HEART RATE: 76 BPM | DIASTOLIC BLOOD PRESSURE: 74 MMHG | WEIGHT: 195 LBS | RESPIRATION RATE: 18 BRPM | TEMPERATURE: 98 F | SYSTOLIC BLOOD PRESSURE: 120 MMHG

## 2017-10-30 DIAGNOSIS — M54.16 LUMBAR RADICULITIS: ICD-10-CM

## 2017-10-30 DIAGNOSIS — G89.4 CHRONIC PAIN ASSOCIATED WITH SIGNIFICANT PSYCHOSOCIAL DYSFUNCTION: Primary | ICD-10-CM

## 2017-10-30 PROCEDURE — 99999 PR PBB SHADOW E&M-EST. PATIENT-LVL III: CPT | Mod: PBBFAC,,, | Performed by: ANESTHESIOLOGY

## 2017-10-30 PROCEDURE — 99213 OFFICE O/P EST LOW 20 MIN: CPT | Mod: PBBFAC,PO | Performed by: ANESTHESIOLOGY

## 2017-10-30 PROCEDURE — 99024 POSTOP FOLLOW-UP VISIT: CPT | Mod: ,,, | Performed by: ANESTHESIOLOGY

## 2017-10-30 NOTE — PROGRESS NOTES
This note was completed with dictation software and grammatical errors may exist.    CC: back pain    HPI: The patient is a 67 year-old Female with a history of colon CA, sleep apnea, anemia, depression and postlaminectomy syndrome now with myelodysplastic syndrome who presents in referral from Dr. Villanueva.  She returns in follow-up today 1 week status post spinal cord stimulator implantation, Mg.  She reports that the first place she had a great deal back pain and headaches, this is resolving but she still feels fatigued, general malaise.  She denies any fevers or chills, no nausea or vomiting.  The stimulator is turned on but does not seem to be providing much relief at this point like she had during the trial.  However she is feeling better each day.      Pain intervention history:  She is status post caudal epidural steroid injection on 5/7/14 with 90% relief lasting 3 days, then the pain slowly came back over 7 days.  She is status post caudal epidural steroid injection on 6/17/14 with 70% relief.  She is status post caudal epidural steroid injection on 9/10/15 with almost complete relief lasting 5-6 days.  She is status post left L3 and L5 transforaminal epidural steroid injections on 10/14/15 with 0% relief.  She is status post left L4 transforaminal epidural steroid injection on 1/13/16 with about 90% relief lasting one week.  She underwent removal spinal cord stimulator and L4/5 hardware on 3/16/16 with Dr. Aldrich.  She is status post caudal epidural steroid injection on 4/25/17 with 0% relief.  She is status post bilateral L5/S1 facet joint injections on 7/14/17 with 0% relief.     ROS:She reports fatigue, joint pain.  Balance of review of systems is negative.    Medical, surgical, family and social history reviewed elsewhere in record.    Medications/Allergies: See med card    Vitals:    10/30/17 0932   BP: 120/74   Pulse: 76   Resp: 18   Temp: 98.4 °F (36.9 °C)   TempSrc: Oral   Weight: 88.5  kg (195 lb)   PainSc:   4   PainLoc: Back         Physical exam:  Gen: A and O x3, pleasant, well-groomed  Skin: No rashes or obvious lesions  HEENT: PERRLA  CVS: Regular rate and rhythm, normal S1 and S2, no murmurs.  Resp: Clear to auscultation bilaterally, no wheezes or rales.  Abdomen: Soft, NT/ND, normal bowel sounds present.  Musculoskeletal: Nonantalgic gait.    Neuro:  Lower extremities: 5/5 strength bilaterally  Reflexes: Patellar 0+, Achilles 2+ bilaterally.  Sensory:  Intact and symmetrical to light touch and pinprick in L2-S1 dermatomes bilaterally.  Incision sites are clean, dry, intact no erythema or drainage.  No tenderness over the incision sites.  There is some ecchymoses around the IPG insertion site and no major tenderness.    Imaging:  MRI LUMBAR SPINE W/WO CONTRAST ON 8/22/06  1. MINIMAL L3-4 DISC BULGE CAUSING MINIMAL THECAL SAC COMPRESSION, AND THERE IS ALSO MILD BILATERAL DEGENERATIVE FACET ARTHROSIS.   2. L4-5 DISCECTOMY AND L5 LAMINECTOMY WITH NO EVIDENCE OF A SIGNIFICANT SPINAL CANAL COMPROMISE. THERE IS MILD BILATERAL FORAMINAL NARROWING AT THE L4-5 LEVEL   3. DEGENERATION AND MILD BULGING OF THE L5-S1 DISC WITH ASSOCIATED MILD THECAL SAC COMPRESSION.   4. ROUNDED MASS ARISING FROM THE LEFT KIDNEY WHICH MOST LIKELY REPRESENTS A CYST. ULTRASOUND IS RECOMMENDED TO EXCLUDE A RENAL NEOPLASM.    2/11/14 x-ray tib-fib:  No obvious right tibial or fibular fracture is noted. No worrisome abnormality is noted. A linear metallic density is noted on the lateral view overlying the forefoot dorsally near the navicular cuneiform articulation. This could relate to a foreign body or overlying density relating to clothing. Please clinically correlate a small quadriceps patellar spur is noted.    10/1/15 CT lumbar spine  T12/L1: Mild disk bulging is evident and degenerative facet changes are noted at this level. The canal and foramina are grossly intact.  L1/L2: Mild annular disk bulge and mild  degenerative facet changes produce only minimal canal and foraminal distortion to  L2/L3: Degenerative facet changes noted in the mild annular disk and facet disease.  L3/L4: Posterior canal ligamentous hypertrophy and degenerative facet changes are present. There is moderate annular disk bulging. This combines to produce mild canal and left greater than right foraminal narrowing. This is slightly worsened from the previous exam.  L4/L5: The canal is grossly intact as the surgical level. Degenerative facet changes are noted bilaterally.  L5/S1: Moderate degenerative facet changes noted bilaterally and there is suggestion of disk bulging with left greater than right foraminal narrowing.    11/16/16 MRI lumbar spine with and without contrast  There have been prior laminectomies at L4 and L5, with interbody spacer present at L4-5.  Vertebral body height and alignment are within normal limits.  Marrow signal is heterogeneous.  There is moderate marrow enhancement within the L4 and L5 vertebral bodies centered about the disc space which is likely reactive.  The conus terminates at T12-L1.  Disc desiccation is present throughout the lumbar spine.  Mild loss of disc height is present at the L5-S1 level.  L1-L2:  No disc herniation. No spinal canal or neuroforaminal narrowing.  L2-L3:  No disc herniation. No spinal canal or neuroforaminal narrowing.  L3-L4:  There is mild diffuse disc bulging and moderate facet arthropathy with significant thickening of the ligament of flavum.  The findings contribute to mild spinal canal narrowing without significant neuroforaminal narrowing.  L4-L5:  Moderate bilateral neuroforaminal narrowing is present which appears predominantly due to facet arthropathy, with some contribution from enhancing scar tissue formation within the neuroforamen on the right.  No spinal canal narrowing.  L5-S1:  There is mild broad-based posterior bulging of the disc, abdomen and moderate bilateral facet  arthropathy.  There is resultant mild bilateral neuroforaminal narrowing.      Assessment:  The patient is a 67year-old Female with a history of colon CA, sleep apnea, anemia, depression and postlaminectomy syndrome now with myelodysplastic syndrome who presents in referral from Dr. Villanueva.  No diagnosis found.    Plan:  1.  I removed the [place] Bandages and the wound sites look well healed, Steri-Strips still in place.  I've instructed her to begin showering but not too vigorously scrub incision sites and allow the Steri-Strips to stay in place until they fall off in the next 3-5 days.  Do not soak in water for at least 3 weeks.  2.  The device representative is present at the visit today and will be doing some reprogramming so hopefully this should provide good coverage now.  We discussed that we were unable to get the leads as cephalad as hoped secondary to scarring.  However we were able to get the leads in the position where she had relief during the trial.  3.  I will have her return for followup in one month or sooner as needed.

## 2017-10-31 ENCOUNTER — OUTPATIENT CASE MANAGEMENT (OUTPATIENT)
Dept: ADMINISTRATIVE | Facility: OTHER | Age: 67
End: 2017-10-31

## 2017-10-31 NOTE — PROGRESS NOTES
10/31/17-RN CM called patient for follow up. Patient reported that she was resting in bed at this time. Patient stated that she had her spinal cord stimulator procedure on 10/23. Patient reported that she is still dealing with surgical pain as well as back pain. Patient stated that lead placement was not exactly where the trial leads were placed. Patient has a RX for percocet, which she reported that she took last night. Stated that she does not like taking pain medication but took some last night. Inquired about dizziness. Patient reported that it as improved and happens occasionally. Stated that she was directed to go to the ED for further evaluation if she experiences dizziness and chest pain. Encouraged patient to contact OPCM RN CM in the event that needs develop. Verbalized understanding. Will follow up in 2 weeks.       Follow Up Plan:  --Complete med rec-DONE  --Did patient receive resource materials from RN CM?

## 2017-11-14 ENCOUNTER — OUTPATIENT CASE MANAGEMENT (OUTPATIENT)
Dept: ADMINISTRATIVE | Facility: OTHER | Age: 67
End: 2017-11-14

## 2017-11-14 NOTE — PROGRESS NOTES
11/14/17-RN CM called patient for follow up. RN CM inquired about back pain. Stated that she recently had her stimulator reprogrammed as she was not getting adequate pain relief. Inquired about current pain management methods. Reported that she does not like to pain medications. Stated that she has been taking pain medications at night time to help her sleep comfortably but during the day she does not take anything. Stated that she is going to try taking some OTC pain medications. Inquired about dizzy spells. Reported that they are happening less often. Patient appreciative of contact from OPCM RN CM. Encouraged patient to contact OPCM RN CM in the event that needs develop. Verbalized understanding. Will follow up in 3 weeks.       Follow Up Plan:  --Complete med rec-DONE  --Did patient receive resource materials from RN CM?-YES      Patient/caregiver will verbalize 3 noninvasive pain relief measures to help manage the pain within 2 months.  Interventions   · Recognize and provide educational material (CELI).   Status  · Deferred

## 2017-11-28 ENCOUNTER — OFFICE VISIT (OUTPATIENT)
Dept: PAIN MEDICINE | Facility: CLINIC | Age: 67
End: 2017-11-28
Payer: MEDICARE

## 2017-11-28 VITALS
WEIGHT: 198 LBS | RESPIRATION RATE: 18 BRPM | TEMPERATURE: 99 F | HEART RATE: 74 BPM | BODY MASS INDEX: 31.01 KG/M2 | DIASTOLIC BLOOD PRESSURE: 70 MMHG | SYSTOLIC BLOOD PRESSURE: 110 MMHG

## 2017-11-28 DIAGNOSIS — G89.4 CHRONIC PAIN DISORDER: Primary | ICD-10-CM

## 2017-11-28 DIAGNOSIS — M54.16 LUMBAR RADICULOPATHY: ICD-10-CM

## 2017-11-28 DIAGNOSIS — M47.816 LUMBAR SPONDYLOSIS: ICD-10-CM

## 2017-11-28 PROCEDURE — 99214 OFFICE O/P EST MOD 30 MIN: CPT | Mod: PBBFAC,PO | Performed by: ANESTHESIOLOGY

## 2017-11-28 PROCEDURE — 99213 OFFICE O/P EST LOW 20 MIN: CPT | Mod: S$PBB,,, | Performed by: ANESTHESIOLOGY

## 2017-11-28 PROCEDURE — 99999 PR PBB SHADOW E&M-EST. PATIENT-LVL IV: CPT | Mod: PBBFAC,,, | Performed by: ANESTHESIOLOGY

## 2017-11-28 NOTE — PROGRESS NOTES
This note was completed with dictation software and grammatical errors may exist.    CC: back pain    HPI: The patient is a 67 year-old Female with a history of colon CA, sleep apnea, anemia, depression and postlaminectomy syndrome now with myelodysplastic syndrome who presents in referral from Dr. Villanueva.  She is one month status post implantation spinal cord stimulator, Nevro.  She reports that she has gone through several different reprogramming and the most recent that was done about one week ago has provided her with excellent relief.  She currently reports 95% improvement in her back and leg pain.  She states that she does have increased pain if sitting for too long, standing for too long, this is mostly in her back, if she turns a certain way it hurts.  Otherwise she is very pleased with how she is coming along.  Up until several weeks ago she was still having significant pain but now things have greatly improved.    Pain intervention history:  She is status post caudal epidural steroid injection on 5/7/14 with 90% relief lasting 3 days, then the pain slowly came back over 7 days.  She is status post caudal epidural steroid injection on 6/17/14 with 70% relief.  She is status post caudal epidural steroid injection on 9/10/15 with almost complete relief lasting 5-6 days.  She is status post left L3 and L5 transforaminal epidural steroid injections on 10/14/15 with 0% relief.  She is status post left L4 transforaminal epidural steroid injection on 1/13/16 with about 90% relief lasting one week.  She underwent removal spinal cord stimulator and L4/5 hardware on 3/16/16 with Dr. Aldrich.  She is status post caudal epidural steroid injection on 4/25/17 with 0% relief.  She is status post bilateral L5/S1 facet joint injections on 7/14/17 with 0% relief.     ROS:She reports fatigue, joint pain.  Balance of review of systems is negative.    Medical, surgical, family and social history reviewed elsewhere in  record.    Medications/Allergies: See med card    Vitals:    11/28/17 0927   BP: 110/70   Pulse: 74   Resp: 18   Temp: 99.2 °F (37.3 °C)   TempSrc: Oral   Weight: 89.8 kg (197 lb 15.6 oz)   PainSc:   4   PainLoc: Back         Physical exam:  Gen: A and O x3, pleasant, well-groomed  Skin: No rashes or obvious lesions  HEENT: PERRLA  CVS: Regular rate and rhythm, normal S1 and S2, no murmurs.  Resp: Clear to auscultation bilaterally, no wheezes or rales.  Abdomen: Soft, NT/ND, normal bowel sounds present.  Musculoskeletal: Nonantalgic gait.    Neuro:  Lower extremities: 5/5 strength bilaterally  Reflexes: Patellar 0+, Achilles 2+ bilaterally.  Sensory:  Intact and symmetrical to light touch and pinprick in L2-S1 dermatomes bilaterally.  Lumbar spine exam:  Range of motion is mildly reduced with forward flexion and extension with increased pain on extension, oblique extension either side.  Luca's test negative for any pain bilaterally.  Straight leg raise negative bilaterally.  Myofascial exam negative for any major tenderness along the lumbar paraspinous region.    Imaging:  MRI LUMBAR SPINE W/WO CONTRAST ON 8/22/06  1. MINIMAL L3-4 DISC BULGE CAUSING MINIMAL THECAL SAC COMPRESSION, AND THERE IS ALSO MILD BILATERAL DEGENERATIVE FACET ARTHROSIS.   2. L4-5 DISCECTOMY AND L5 LAMINECTOMY WITH NO EVIDENCE OF A SIGNIFICANT SPINAL CANAL COMPROMISE. THERE IS MILD BILATERAL FORAMINAL NARROWING AT THE L4-5 LEVEL   3. DEGENERATION AND MILD BULGING OF THE L5-S1 DISC WITH ASSOCIATED MILD THECAL SAC COMPRESSION.   4. ROUNDED MASS ARISING FROM THE LEFT KIDNEY WHICH MOST LIKELY REPRESENTS A CYST. ULTRASOUND IS RECOMMENDED TO EXCLUDE A RENAL NEOPLASM.    2/11/14 x-ray tib-fib:  No obvious right tibial or fibular fracture is noted. No worrisome abnormality is noted. A linear metallic density is noted on the lateral view overlying the forefoot dorsally near the navicular cuneiform articulation. This could relate to a foreign body or  overlying density relating to clothing. Please clinically correlate a small quadriceps patellar spur is noted.    10/1/15 CT lumbar spine  T12/L1: Mild disk bulging is evident and degenerative facet changes are noted at this level. The canal and foramina are grossly intact.  L1/L2: Mild annular disk bulge and mild degenerative facet changes produce only minimal canal and foraminal distortion to  L2/L3: Degenerative facet changes noted in the mild annular disk and facet disease.  L3/L4: Posterior canal ligamentous hypertrophy and degenerative facet changes are present. There is moderate annular disk bulging. This combines to produce mild canal and left greater than right foraminal narrowing. This is slightly worsened from the previous exam.  L4/L5: The canal is grossly intact as the surgical level. Degenerative facet changes are noted bilaterally.  L5/S1: Moderate degenerative facet changes noted bilaterally and there is suggestion of disk bulging with left greater than right foraminal narrowing.    11/16/16 MRI lumbar spine with and without contrast  There have been prior laminectomies at L4 and L5, with interbody spacer present at L4-5.  Vertebral body height and alignment are within normal limits.  Marrow signal is heterogeneous.  There is moderate marrow enhancement within the L4 and L5 vertebral bodies centered about the disc space which is likely reactive.  The conus terminates at T12-L1.  Disc desiccation is present throughout the lumbar spine.  Mild loss of disc height is present at the L5-S1 level.  L1-L2:  No disc herniation. No spinal canal or neuroforaminal narrowing.  L2-L3:  No disc herniation. No spinal canal or neuroforaminal narrowing.  L3-L4:  There is mild diffuse disc bulging and moderate facet arthropathy with significant thickening of the ligament of flavum.  The findings contribute to mild spinal canal narrowing without significant neuroforaminal narrowing.  L4-L5:  Moderate bilateral  neuroforaminal narrowing is present which appears predominantly due to facet arthropathy, with some contribution from enhancing scar tissue formation within the neuroforamen on the right.  No spinal canal narrowing.  L5-S1:  There is mild broad-based posterior bulging of the disc, abdomen and moderate bilateral facet arthropathy.  There is resultant mild bilateral neuroforaminal narrowing.      Assessment:  The patient is a 67year-old Female with a history of colon CA, sleep apnea, anemia, depression and postlaminectomy syndrome now with myelodysplastic syndrome who presents in referral from Dr. Villanueva.  1. Chronic pain disorder     2. Lumbar radiculopathy         Plan:  1.  Since she is doing well I encouraged her to increase activity, she is going to wear the back brace for one more month.  I will have her follow-up in 3 months or sooner as needed.

## 2017-11-29 ENCOUNTER — OFFICE VISIT (OUTPATIENT)
Dept: FAMILY MEDICINE | Facility: CLINIC | Age: 67
End: 2017-11-29
Payer: MEDICARE

## 2017-11-29 VITALS
DIASTOLIC BLOOD PRESSURE: 72 MMHG | SYSTOLIC BLOOD PRESSURE: 104 MMHG | OXYGEN SATURATION: 97 % | HEART RATE: 71 BPM | HEIGHT: 67 IN | WEIGHT: 198.19 LBS | TEMPERATURE: 100 F | BODY MASS INDEX: 31.11 KG/M2

## 2017-11-29 DIAGNOSIS — J06.9 VIRAL UPPER RESPIRATORY TRACT INFECTION: Primary | ICD-10-CM

## 2017-11-29 PROCEDURE — 99999 PR PBB SHADOW E&M-EST. PATIENT-LVL IV: CPT | Mod: PBBFAC,,, | Performed by: NURSE PRACTITIONER

## 2017-11-29 PROCEDURE — 99214 OFFICE O/P EST MOD 30 MIN: CPT | Mod: PBBFAC,PN | Performed by: NURSE PRACTITIONER

## 2017-11-29 PROCEDURE — 99213 OFFICE O/P EST LOW 20 MIN: CPT | Mod: S$PBB,,, | Performed by: NURSE PRACTITIONER

## 2017-11-29 RX ORDER — FLUTICASONE PROPIONATE 50 MCG
2 SPRAY, SUSPENSION (ML) NASAL DAILY
Qty: 16 G | Refills: 11 | Status: CANCELLED | OUTPATIENT
Start: 2017-11-29

## 2017-11-29 NOTE — PATIENT INSTRUCTIONS
Over the counter flonase 2 sprays each nostril twice daily, and over the counter antihistamine of choice.

## 2017-11-29 NOTE — PROGRESS NOTES
Subjective:       Patient ID: Charmaine Whalen is a 67 y.o. female.    Chief Complaint: Sinus Problem (ear and head x 3 days) and Cough    HPI     Patient presents to clinic with R ear pain, cough - nonproductive, sneezing, fever - subjective but was 99 something at pain management yesterday. She has been exposed to strep throat over the holidays. Has tried OTC mucinex and allegra without relief of sx. Denies COPD, emphysema, and asthma. Reports bronchitis in the past. Denies wheezing and CP. Patient reports chronic SOB with exertion r/t mitral valve prolapse and MDS.   HX of MDS - last blood transfusion yesterday, reports that she is established with Dr Xavier oncology.      Review of Systems   Constitutional: Positive for chills, fatigue and fever (subjective).   HENT: Positive for ear pain (right side), postnasal drip, rhinorrhea, sinus pressure, sneezing, sore throat, trouble swallowing and voice change.    Eyes: Positive for itching. Negative for pain and redness.   Respiratory: Positive for cough and shortness of breath (chronic - mitral valve prolapse and MDS). Negative for wheezing.    Cardiovascular: Positive for palpitations (afib - rate controlled). Negative for chest pain and leg swelling.   Gastrointestinal: Positive for constipation and diarrhea (Hx of IBS). Negative for nausea and vomiting.   Genitourinary: Negative for difficulty urinating and frequency.   Musculoskeletal: Negative for myalgias, neck pain and neck stiffness.   Skin: Negative for rash and wound.   Neurological: Positive for dizziness (Notices when her sinus are full) and headaches. Negative for light-headedness.   Psychiatric/Behavioral: Negative for dysphoric mood and sleep disturbance.       Objective:      Physical Exam   Constitutional: She is oriented to person, place, and time. She appears well-developed and well-nourished.   HENT:   Head: Normocephalic and atraumatic.   Right Ear: Tympanic membrane is not erythematous. A middle  ear effusion is present.   Left Ear: Tympanic membrane is not erythematous. A middle ear effusion is present.   Nose: Mucosal edema and rhinorrhea present. Right sinus exhibits maxillary sinus tenderness. Right sinus exhibits no frontal sinus tenderness. Left sinus exhibits maxillary sinus tenderness. Left sinus exhibits no frontal sinus tenderness.   Mouth/Throat: Uvula is midline and mucous membranes are normal. No oropharyngeal exudate or posterior oropharyngeal erythema.   Eyes: Pupils are equal, round, and reactive to light. Right eye exhibits no discharge. Left eye exhibits no discharge.   Neck: Normal range of motion. Neck supple.   Cardiovascular: Normal rate, regular rhythm and normal heart sounds.    Pulmonary/Chest: Effort normal and breath sounds normal. No respiratory distress. She has no wheezes. She has no rales.   Musculoskeletal: Normal range of motion. She exhibits no edema.   Lymphadenopathy:     She has cervical adenopathy.        Right cervical: Superficial cervical adenopathy present.   Neurological: She is alert and oriented to person, place, and time. No cranial nerve deficit. Coordination normal.   Skin: Skin is warm and dry. No rash noted. No erythema.   Psychiatric: She has a normal mood and affect. Her behavior is normal.   Nursing note and vitals reviewed.      Assessment:       1. Viral upper respiratory tract infection        Plan:   Charmaine was seen today for sinus problem and cough.    Diagnoses and all orders for this visit:    Viral upper respiratory tract infection  POCT Strep negative.   Start flonase twice daily, 2 squirts in each nostril, OTC antihistamine of choice.  RTC if symptoms worsen or fail to improve.

## 2017-12-05 ENCOUNTER — OUTPATIENT CASE MANAGEMENT (OUTPATIENT)
Dept: ADMINISTRATIVE | Facility: OTHER | Age: 67
End: 2017-12-05

## 2017-12-05 NOTE — PROGRESS NOTES
12/5/17-RN CM called patient for follow up. Inquired about back pain. Stated that she has finally gotten some relief since readjustment of her spinal cord stimulator. Reported that she does have breathrough pain when she remains in 1 position for too long, currently does not take any medications for breakthrough and does not want to take any medications for breakthrough pain. Reported that she is wearing a back brace which is also helping with relief of her her back pain. Patient denies any needs at this time. Appreciative of contact from OPCM RN CM. Encouraged patient to contact OPCM RN CM in the event that needs develop. Verbalized understanding. Will follow up in 3 weeks. Will plan on discussing case closure at that time.      Follow Up Plan:  --Complete med rec-DONE  --Did patient receive resource materials from RN CM?-YES

## 2017-12-26 ENCOUNTER — OUTPATIENT CASE MANAGEMENT (OUTPATIENT)
Dept: ADMINISTRATIVE | Facility: OTHER | Age: 67
End: 2017-12-26

## 2017-12-26 NOTE — PROGRESS NOTES
12/26/17-1st Attempt to Follow up for OPCM. No answer. Message left requesting return call. Will attempt to follow up at a later date.

## 2018-01-03 ENCOUNTER — OUTPATIENT CASE MANAGEMENT (OUTPATIENT)
Dept: ADMINISTRATIVE | Facility: OTHER | Age: 68
End: 2018-01-03

## 2018-01-03 NOTE — PROGRESS NOTES
1/3/18-RN NELLIE called patient for follow up. Reported that she continues to do well with her spinal cord stimulator. Reported that she has experienced 90% pain relief. Denies any dizziness. Patient managing needs.  No additional needs notified at this time. RN NELLIE discussed case closure with patient, who is in agreement. Encouraged patient to contact OPCM RN with needs. Verbalized understanding. Will close case at this time.

## 2018-01-09 ENCOUNTER — OUTPATIENT CASE MANAGEMENT (OUTPATIENT)
Dept: ADMINISTRATIVE | Facility: OTHER | Age: 68
End: 2018-01-09

## 2018-01-09 NOTE — PROGRESS NOTES
Please note this patient was mailed an Outpatient Care Management Patient Satisfaction Discharge Survey on 1/9/18.    Please contact [facility] at [phone] with any questions.    Thank you,    Ellie Smith, SSC

## 2018-02-02 ENCOUNTER — PATIENT MESSAGE (OUTPATIENT)
Dept: PAIN MEDICINE | Facility: CLINIC | Age: 68
End: 2018-02-02

## 2018-02-25 RX ORDER — GABAPENTIN 300 MG/1
CAPSULE ORAL
Qty: 360 CAPSULE | Refills: 2 | Status: SHIPPED | OUTPATIENT
Start: 2018-02-25 | End: 2019-02-25 | Stop reason: SDUPTHER

## 2018-02-26 ENCOUNTER — OFFICE VISIT (OUTPATIENT)
Dept: PAIN MEDICINE | Facility: CLINIC | Age: 68
End: 2018-02-26
Payer: MEDICARE

## 2018-02-26 ENCOUNTER — TELEPHONE (OUTPATIENT)
Dept: PAIN MEDICINE | Facility: CLINIC | Age: 68
End: 2018-02-26

## 2018-02-26 VITALS
DIASTOLIC BLOOD PRESSURE: 64 MMHG | WEIGHT: 202.63 LBS | BODY MASS INDEX: 31.73 KG/M2 | HEART RATE: 74 BPM | RESPIRATION RATE: 18 BRPM | SYSTOLIC BLOOD PRESSURE: 130 MMHG | TEMPERATURE: 99 F

## 2018-02-26 DIAGNOSIS — M79.18 MYOFASCIAL PAIN: ICD-10-CM

## 2018-02-26 DIAGNOSIS — R29.898 MUSCULAR DECONDITIONING: ICD-10-CM

## 2018-02-26 DIAGNOSIS — R26.81 GAIT INSTABILITY: ICD-10-CM

## 2018-02-26 DIAGNOSIS — M51.36 DDD (DEGENERATIVE DISC DISEASE), LUMBAR: Primary | ICD-10-CM

## 2018-02-26 DIAGNOSIS — G89.4 CHRONIC PAIN DISORDER: ICD-10-CM

## 2018-02-26 DIAGNOSIS — M54.16 LUMBAR RADICULITIS: ICD-10-CM

## 2018-02-26 PROCEDURE — 99214 OFFICE O/P EST MOD 30 MIN: CPT | Mod: PBBFAC,PN | Performed by: PHYSICIAN ASSISTANT

## 2018-02-26 PROCEDURE — 99999 PR PBB SHADOW E&M-EST. PATIENT-LVL IV: CPT | Mod: PBBFAC,,, | Performed by: PHYSICIAN ASSISTANT

## 2018-02-26 PROCEDURE — 99214 OFFICE O/P EST MOD 30 MIN: CPT | Mod: S$PBB,,, | Performed by: PHYSICIAN ASSISTANT

## 2018-02-26 RX ORDER — HYDROCODONE BITARTRATE AND ACETAMINOPHEN 7.5; 325 MG/1; MG/1
1 TABLET ORAL EVERY 8 HOURS PRN
Qty: 30 TABLET | Refills: 0 | Status: SHIPPED | OUTPATIENT
Start: 2018-02-26 | End: 2019-10-09 | Stop reason: SDUPTHER

## 2018-02-26 NOTE — TELEPHONE ENCOUNTER
Patient seen in clinic.  She is having severe increased pain and requested a prescription for something to be sent to her pharmacy.  She only recently had pain medication following her spinal cord stimulator implant.

## 2018-02-28 NOTE — PROGRESS NOTES
This note was completed with dictation software and grammatical errors may exist.    CC: back and leg pain    HPI: The patient is a 67 year-old Female with a history of colon CA, sleep apnea, anemia, depression and postlaminectomy syndrome now with myelodysplastic syndrome who presents in referral from Dr. Villanueva.  She returns in follow-up today with worsening low back and bilateral leg pain.  She states that about a week ago she was doing some gardening and has had severe pain ever since.  This was the first significant amount of activity that she has done a long time.  Her pain is worse with standing and walking but also present with sitting.  She is requesting some pain medication to take.  She denies numbness but feels that she is generally weak due to lack of activity.  She denies bladder or bowel incontinence.    Pain intervention history:  She is status post caudal epidural steroid injection on 5/7/14 with 90% relief lasting 3 days, then the pain slowly came back over 7 days.  She is status post caudal epidural steroid injection on 6/17/14 with 70% relief.  She is status post caudal epidural steroid injection on 9/10/15 with almost complete relief lasting 5-6 days.  She is status post left L3 and L5 transforaminal epidural steroid injections on 10/14/15 with 0% relief.  She is status post left L4 transforaminal epidural steroid injection on 1/13/16 with about 90% relief lasting one week.  She underwent removal spinal cord stimulator and L4/5 hardware on 3/16/16 with Dr. Aldrich.  She is status post caudal epidural steroid injection on 4/25/17 with 0% relief.  She is status post bilateral L5/S1 facet joint injections on 7/14/17 with 0% relief.     ROS:She reports fatigue, joint pain.  Balance of review of systems is negative.    Medical, surgical, family and social history reviewed elsewhere in record.    Medications/Allergies: See med card    Vitals:    02/26/18 1404   BP: 130/64   Pulse: 74   Resp:  18   Temp: 98.7 °F (37.1 °C)   TempSrc: Oral   Weight: 91.9 kg (202 lb 9.6 oz)   PainSc:   4   PainLoc: Back         Physical exam:  Gen: A and O x3, pleasant, well-groomed  Skin: No rashes or obvious lesions  HEENT: PERRLA  CVS: Regular rate and rhythm, normal S1 and S2, no murmurs.  Resp: Clear to auscultation bilaterally, no wheezes or rales.  Abdomen: Soft, NT/ND, normal bowel sounds present.  Musculoskeletal: Nonantalgic gait.    Neuro:  Lower extremities: 5/5 strength bilaterally  Reflexes: Patellar 0+, Achilles 2+ bilaterally.  Sensory:  Intact and symmetrical to light touch and pinprick in L2-S1 dermatomes bilaterally.    Lumbar spine exam:  Range of motion is mildly reduced with flexion and extension with increased low back pain during each maneuver.  Luca's test causes back pain only.  Straight leg raise causes low back and leg pain bilaterally.  Myofascial exam: Mild tenderness to palpation to the lumbar paraspinous muscles.    Imaging:  MRI LUMBAR SPINE W/WO CONTRAST ON 8/22/06  1. MINIMAL L3-4 DISC BULGE CAUSING MINIMAL THECAL SAC COMPRESSION, AND THERE IS ALSO MILD BILATERAL DEGENERATIVE FACET ARTHROSIS.   2. L4-5 DISCECTOMY AND L5 LAMINECTOMY WITH NO EVIDENCE OF A SIGNIFICANT SPINAL CANAL COMPROMISE. THERE IS MILD BILATERAL FORAMINAL NARROWING AT THE L4-5 LEVEL   3. DEGENERATION AND MILD BULGING OF THE L5-S1 DISC WITH ASSOCIATED MILD THECAL SAC COMPRESSION.   4. ROUNDED MASS ARISING FROM THE LEFT KIDNEY WHICH MOST LIKELY REPRESENTS A CYST. ULTRASOUND IS RECOMMENDED TO EXCLUDE A RENAL NEOPLASM.    2/11/14 x-ray tib-fib:  No obvious right tibial or fibular fracture is noted. No worrisome abnormality is noted. A linear metallic density is noted on the lateral view overlying the forefoot dorsally near the navicular cuneiform articulation. This could relate to a foreign body or overlying density relating to clothing. Please clinically correlate a small quadriceps patellar spur is noted.    10/1/15 CT  lumbar spine  T12/L1: Mild disk bulging is evident and degenerative facet changes are noted at this level. The canal and foramina are grossly intact.  L1/L2: Mild annular disk bulge and mild degenerative facet changes produce only minimal canal and foraminal distortion to  L2/L3: Degenerative facet changes noted in the mild annular disk and facet disease.  L3/L4: Posterior canal ligamentous hypertrophy and degenerative facet changes are present. There is moderate annular disk bulging. This combines to produce mild canal and left greater than right foraminal narrowing. This is slightly worsened from the previous exam.  L4/L5: The canal is grossly intact as the surgical level. Degenerative facet changes are noted bilaterally.  L5/S1: Moderate degenerative facet changes noted bilaterally and there is suggestion of disk bulging with left greater than right foraminal narrowing.    11/16/16 MRI lumbar spine with and without contrast  There have been prior laminectomies at L4 and L5, with interbody spacer present at L4-5.  Vertebral body height and alignment are within normal limits.  Marrow signal is heterogeneous.  There is moderate marrow enhancement within the L4 and L5 vertebral bodies centered about the disc space which is likely reactive.  The conus terminates at T12-L1.  Disc desiccation is present throughout the lumbar spine.  Mild loss of disc height is present at the L5-S1 level.  L1-L2:  No disc herniation. No spinal canal or neuroforaminal narrowing.  L2-L3:  No disc herniation. No spinal canal or neuroforaminal narrowing.  L3-L4:  There is mild diffuse disc bulging and moderate facet arthropathy with significant thickening of the ligament of flavum.  The findings contribute to mild spinal canal narrowing without significant neuroforaminal narrowing.  L4-L5:  Moderate bilateral neuroforaminal narrowing is present which appears predominantly due to facet arthropathy, with some contribution from enhancing scar  tissue formation within the neuroforamen on the right.  No spinal canal narrowing.  L5-S1:  There is mild broad-based posterior bulging of the disc, abdomen and moderate bilateral facet arthropathy.  There is resultant mild bilateral neuroforaminal narrowing.      Assessment:  The patient is a 67year-old Female with a history of colon CA, sleep apnea, anemia, depression and postlaminectomy syndrome now with myelodysplastic syndrome who presents in referral from Dr. Villanueva.  1. DDD (degenerative disc disease), lumbar  Ambulatory Referral to Physical/Occupational Therapy   2. Lumbar radiculitis  Ambulatory Referral to Physical/Occupational Therapy   3. Muscular deconditioning  Ambulatory Referral to Physical/Occupational Therapy   4. Myofascial pain  Ambulatory Referral to Physical/Occupational Therapy   5. Gait instability  Ambulatory Referral to Physical/Occupational Therapy   6. Chronic pain disorder         Plan:  1.  I have contacted the spinal cord stimulator representative for reprogramming.  2.  Dr. Samuels provided a prescription for hydrocodone-acetaminophen 7.5/325 mg #30.    3.  I completed orders for physical therapy at our facility here.  We had a long discussion regarding having a sedentary lifestyle and the importance of regular exercise for overall health but also so that she does not have severe increased pain every time she does a physical activity.  4.  Follow-up in 3 months or sooner as needed.    Greater than 50% of this 30 minute visit was spent on counseling the patient.

## 2018-03-01 ENCOUNTER — CLINICAL SUPPORT (OUTPATIENT)
Dept: REHABILITATION | Facility: HOSPITAL | Age: 68
End: 2018-03-01
Payer: MEDICARE

## 2018-03-01 DIAGNOSIS — G89.29 CHRONIC BILATERAL LOW BACK PAIN WITH BILATERAL SCIATICA: Primary | ICD-10-CM

## 2018-03-01 DIAGNOSIS — M54.42 CHRONIC BILATERAL LOW BACK PAIN WITH BILATERAL SCIATICA: Primary | ICD-10-CM

## 2018-03-01 DIAGNOSIS — M54.41 CHRONIC BILATERAL LOW BACK PAIN WITH BILATERAL SCIATICA: Primary | ICD-10-CM

## 2018-03-01 PROCEDURE — G8978 MOBILITY CURRENT STATUS: HCPCS | Mod: CK,PO

## 2018-03-01 PROCEDURE — 97161 PT EVAL LOW COMPLEX 20 MIN: CPT | Mod: PO

## 2018-03-01 PROCEDURE — G8979 MOBILITY GOAL STATUS: HCPCS | Mod: CK,PO

## 2018-03-01 PROCEDURE — 97110 THERAPEUTIC EXERCISES: CPT | Mod: PO

## 2018-03-01 NOTE — PATIENT INSTRUCTIONS
Home Exercises:  3/1/2018    Pelvic Tilt    Flatten back by tightening stomach muscles and buttocks. Do not hold your breath.  Hold 2 seconds.  Repeat 10 times per set. Do 2 sets per session. Do 1 sessions per day.      Bridging    Flatten back against floor then slowly raise buttocks from floor, keeping stomach tight. Hold 2 seconds.  Repeat 10 times per set. Do 3 sets per session. Do 1 sessions per day.    Strengthening: Hip Abductor - Resisted    Lie flat with band looped around both legs above knees, and push thighs apart, ensuring right and left move together.  Repeat 10 times per set. Do 2 sets per session. Do 1u sessions per day.

## 2018-03-01 NOTE — PLAN OF CARE
Physical Therapy Evaluation    Name: Charmaine Whalen  Clinic Number: [id number]      Diagnosis:   Encounter Diagnosis   Name Primary?    Chronic bilateral low back pain with bilateral sciatica Yes     Physician: Damien Hernandez *  Treatment Orders: PT Eval and Treat    Past Medical History:   Diagnosis Date    *Atrial fibrillation     History of Cardiac Ablation    Allergy     Anemia     Anticoagulant long-term use     ASA 81 mg    Anxiety     Atherosclerotic heart disease of native coronary artery with angina pectoris     2/17 C Mild, moderate diagonal disease. Small distal LAD. Mild diastolic dysfunction.    Cataract     OU    Chronic pain     Leg, lower back    Colon cancer     Colon polyp     DDD (degenerative disc disease), lumbar     h/o sciatica    Depression     Encounter for blood transfusion     Esophageal stenosis     Fibromyalgia     Gastritis     GERD (gastroesophageal reflux disease)     hiatal as well    Hiatal hernia     History of psychiatric hospitalization     1997 at [facility]    History of renal calculi     Hypersomnia with sleep apnea     uses C-pap    Hypothyroidism 1/28/2013    Hashimoto's, with Hypothyroidism    Irritable bowel syndrome     Meningitis     age 3    Myelodysplastic syndrome     bone marrow cancer    CORWIN (obstructive sleep apnea)     doesn't use her cpap    Personal history of colonic polyps     PVD (posterior vitreous detachment)     OU    Retinal detachment     HST-OS    Rheumatic fever     as a child    Supraventricular tachycardia     Thrombocytopenia      Current Outpatient Prescriptions   Medication Sig    ascorbic acid (VITAMIN C) 1000 MG tablet Take 1,000 mg by mouth every morning.     aspirin (ECOTRIN) 81 MG EC tablet Take 1 tablet (81 mg total) by mouth once daily. (Patient taking differently: Take 81 mg by mouth every evening. )    b complex vitamins tablet Take 1 tablet by mouth every morning.     buPROPion (WELLBUTRIN  XL) 300 MG 24 hr tablet Take 1 tablet (300 mg total) by mouth every morning.    cholecalciferol, vitamin D3, (VITAMIN D) 1,000 unit capsule Take 2,000 Units by mouth every morning.     docusate sodium (STOOL SOFTENER) 100 mg capsule Take 100 mg by mouth 2 (two) times daily. (Patient taking differently: Take 100 mg by mouth every evening. )    fluoxetine (PROZAC) 40 MG capsule Take 1 capsule (40 mg total) by mouth every morning.    gabapentin (NEURONTIN) 300 MG capsule TAKE 2 CAPSULES BY MOUTH TWICE DAILY    hydrocodone-acetaminophen 7.5-325mg (NORCO) 7.5-325 mg per tablet Take 1 tablet by mouth every 8 (eight) hours as needed for Pain.    levothyroxine (SYNTHROID) 100 MCG tablet TAKE 1 TABLET BY MOUTH EVERY DAY    oxycodone-acetaminophen (PERCOCET)  mg per tablet Take 1 tablet by mouth every 6 (six) hours as needed for Pain.    pantoprazole (PROTONIX) 40 MG tablet TAKE 1 TABLET(40 MG) BY MOUTH EVERY DAY    ranitidine (ZANTAC) 300 MG tablet TAKE 1 TABLET BY MOUTH EVERY EVENING 30 TO 60 MINUTES BEFORE BEDTIME    trazodone (DESYREL) 100 MG tablet TAKE 1 TO 2 TABLETS BY MOUTH EVERY NIGHT AT BEDTIME FOR SLEEP    vitamin E 400 UNIT capsule Take 400 Units by mouth once daily.     No current facility-administered medications for this visit.      Review of patient's allergies indicates:   Allergen Reactions    Sulfa (sulfonamide antibiotics) Hives     Other reaction(s): Hives     Precautions: none    Evaluation Date: 03/01/2018  Visit # authorized: 1/20  Authorization expiration: 12/31/2018  Plan of Care Expiration: 4/12/2018    Subjective   Onset/MIKI: gradual    Primary concern/ Chief complaints:    Charmaine is a 67 y.o. female with a PMH of myelodysplastic syndrome and postlaminectomy syndrome that presents to [facility] outpatient physical therapy secondary to low back pain with bilateral sciatica.  Injury/surgery occurred in 2015 when the pt took a fall and had an increase in back pain. She had a lumbar  fusion and had PT afterwards. Pt has had a nerve stimulator for a while.     MRI was taken and revealed:  MRI LUMBAR SPINE W/WO CONTRAST ON 8/22/06  1. MINIMAL L3-4 DISC BULGE CAUSING MINIMAL THECAL SAC COMPRESSION, AND THERE IS ALSO MILD BILATERAL DEGENERATIVE FACET ARTHROSIS.   2. L4-5 DISCECTOMY AND L5 LAMINECTOMY WITH NO EVIDENCE OF A SIGNIFICANT SPINAL CANAL COMPROMISE. THERE IS MILD BILATERAL FORAMINAL NARROWING AT THE L4-5 LEVEL   3. DEGENERATION AND MILD BULGING OF THE L5-S1 DISC WITH ASSOCIATED MILD THECAL SAC COMPRESSION.    Previous treatment included physical therapy, steroid shots, spinal fusion in 1999, and revision in 2016. Pt reports having a sedentary lifestyle. Pt is to get a spinal cord stimulator for pain control. Pt reports that bending, stooping increases low back pain and reports low back pain at worst is a 10 on the VAS. Reports that her leg pain and low back pain is about the same in intensity. Pt uses spinal cord stimulator and rest to control low back pain symptoms. Pt has a decreased ability to perform ADLs such as bending, stooping, lifting. Pt is currently limiting her lifting. Pt reports intermittent numbness and tingling in the LLE down to her foot. No cultural, environmental, or spiritual barriers identified to treatment or learning.      Pain Scale: Charmaine rates pain on a scale of 0-10 to be 10 at worst; 2 currently; n/a at best .    Patient Goals: Pt would like to decrease pain and increase function so she can return to pain-free completion of all normal daily activities. Pt would like to garden and shop with decreased pain.        Objective     Observation:    Lumbar Range of Motion:    Degrees Pain   Flexion 30 degrees  (60 is norm) + L low back        Extension 8 degrees  (25 is norm) + R buttock, less painful than flexion        Left Side Bending 25 degrees  (25 is norm) Pain in R QL        Right Side Bending 25 degrees  (25 is norm) -        Left Rotation   80% -        Right  Rotation   80% _             Lower Extremity Strength  Right LE  Left LE    Knee extension: 4-/5 Knee extension: 4-/5   Knee flexion: 4-/5 Knee flexion: 4-/5   Hip flexion: 4-/5 Hip flexion: 4-/5   Hip extension:  4-/5 Hip extension: 4-/5   Hip abduction: 4-/5 Hip abduction: 4-/5   Hip adduction: 4-/5 Hip adduction 4-/5   Ankle dorsiflexion: 4+/5 Ankle dorsiflexion: 4+/5   Ankle plantarflexion: 5/5 Ankle plantarflexion: 5/5       Special Tests:  -SLR Test: + BLE  -Bridge Test: -    Joint Mobility: mildly decreased throughout lumbar spine to grade 1-2 PAs    Palpation: ttp to central low back    Sensation: WNL    Flexibility: decreased in HS as assessed by knee flexion with lumbar flexion    Outcome measures:    OSWESTRY: patient score = 58% impairment    G-code mobility current:  CK (indicating 40-60% impaired)    G-code mobility goal:  CK (indicating 40-60% impaired)    PT reviewed FOTO scores for Charmaine Whalen on 03/01/2018.   FOTO scores were entered into EPIC - see media section.    PT Evaluation Completed? Yes  Discussed Plan of Care with patient: Yes    TREATMENT:  Charmaine received therapeutic exercises to develop strength and endurance, flexibility for 15 minutes including: pelvic tilts, supine hip abduction with yellow band and bridging    Instructed pt. Regarding: Proper technique with all exercises. Pt demo good understanding of the education provided. Charmaine demonstrated good return demonstration of activities.      Assessment     This is a 67 y.o. female referred to outpatient physical therapy and presents with a medical diagnosis of low back pain and demonstrates limitations as described in the problem list. Pt will benefit from physical therapy services in order to maximize pain free functional independence. The following goals were discussed with the patient and patient is in agreement with them as to be addressed in the treatment plan. Pt was given a HEP consisting of pelvic tilts,  supine hip abduction and bridging. Pt verbally understood the instructions as they were given and demonstrated proper form and technique during therapy. Pt was advised to perform these exercises free of pain, and to stop performing them if pain occurs.     Pt presents with limitations in lumbar AROM in flexion and extension and presented with decreased lumbar lordosis due to hardware. Pt presents with tight hamstrings and weak hip and core musculature indicating decreased support of the lumbar spine with completion of ADLs. In order to decrease stresses on the lumbar spine with completion of functional movements, Charmaine Whalen  would greatly benefit from stretching of bilateral hamstrings and hip flexors as well as strengthening of core and hip musculature in order to increase dynamic stability at the lumbar spine and improve biomechanics throughout the lumbar spine. Due to patients presentation of bilateral-sided sciatica, Charmaine Whalen would also benefit from nerve mobilization techniques to decrease impingement of the sciatic nerve.      Patient History Examination Clinical Presentation Clinical Decision Making   Comorbidities:  postlaminectomy syndrome, myelodysplastic syndrome    Personal Factors:  Sedentary lifestyle       Activity and Participation Restriction:  Mobility  General tasks and demands    Body Systems:  Musculoskeletal      Body Regions:  Lumbar Stable and uncomplicated         Low            Medical necessity is demonstrated by the following IMPAIRMENTS/PROBLEM LIST:   1)Increase in pain level limiting function   2)Decreased range of motion limiting function   3)Decreased strength limiting function   4)Impaired posture   5)Lack of HEP    GOALS: Short Term Goals: 3 weeks  1. Report decreased low back pain  <   / =  1  /10  to increase tolerance for completion of ADLs  2. Pt to increase B popliteal angle by > or = 10 degrees in order to improve flexibility and posture.   3. Increased MMT   for  hip extension to 5/5 bilaterally to increase tolerance for ADL and work activities.  4. Pt to achieve 40 degrees of active lumbar flexion in order to demonstrate improvements in mobility.   5. Pt to tolerate HEP to improve ROM and independence with ADL's    Long Term Goals: 6 weeks  1. Report decreased low back pain  <   / =  0 /10  to increase tolerance for completion of ADLs  2. Pt to increase B popliteal angle by > or = 20 degrees in order to improve flexibility and posture.   3. Increased MMT  for  hip abduction to 4+/5 bilaterally to increase tolerance for ADL and work activities.  4. Pt will report at CK level (40-60% impaired) on OSWESTRY score for low back pain disability to demonstrate decrease in disability and improvement in back pain.  5. Pt to be Independent with HEP to improve ROM and independence with ADL's  6. Pt to demonstrate negative Bridge Test in order to show improved core strength for lumbar stabilization.     Plan     Pt will be treated by physical therapy 1-3 times a week for 6 weeks for Pt Education, HEP, therapeutic exercises, neuromuscular re-education, joint mobilizations, modalities prn to achieve established goals. Charmaine may at times be seen by a PTA as part of the Rehab Team.     Cont PT for 6 weeks.     Melia Mendez, JAKE  03/01/2018.     I certify the need for these services furnished under this plan of treatment and while under my care.    ______________________________ Physician/Referring Practitioner  Date of Signature

## 2018-03-07 ENCOUNTER — CLINICAL SUPPORT (OUTPATIENT)
Dept: REHABILITATION | Facility: HOSPITAL | Age: 68
End: 2018-03-07
Payer: MEDICARE

## 2018-03-07 DIAGNOSIS — M54.42 CHRONIC BILATERAL LOW BACK PAIN WITH BILATERAL SCIATICA: Primary | ICD-10-CM

## 2018-03-07 DIAGNOSIS — M54.41 CHRONIC BILATERAL LOW BACK PAIN WITH BILATERAL SCIATICA: Primary | ICD-10-CM

## 2018-03-07 DIAGNOSIS — G89.29 CHRONIC BILATERAL LOW BACK PAIN WITH BILATERAL SCIATICA: Primary | ICD-10-CM

## 2018-03-07 PROCEDURE — 97110 THERAPEUTIC EXERCISES: CPT | Mod: PO

## 2018-03-07 NOTE — PROGRESS NOTES
"                                                    Physical Therapy Daily Note   Name: Charmaine Whalen  Clinic Number: [id number]    Date of start of care: 3/1/2018  Date of treatment: 03/07/2018   Time in: 0900  Time out: 1000  Billable time: 55 min  # Visits: 2/20  Auth: (20) 12/31/2018  POC expiration: 4/12/2018    Diagnosis:   Encounter Diagnosis   Name Primary?    Chronic bilateral low back pain with bilateral sciatica Yes     Physician: Damien Hernandez *  Treatment Orders: PT Eval and Treat    Subjective   Pt reports: feeling tired, but not too bad pain-wise    Pain Scale:  5/10      Objective     TREATMENT: therapeutic exercise x55 min  Pelvic tilts 3x10  DKTC with green physioball 2x10x3"   LTR with green physioball 2x10  Long sitting piriformis stretch 2x30 sec BLE  Long sitting hamstring stretch with strap 2x30 sec BLE  Standing heel raises with glute sets 2x10  Standing hip abduction 2x10 BLE  Standing hamstring curls 2x10 BLE  Mini-squats at mat 2x8  Gastroc stretch on wedge 2x30 sec BLE    Written Home Exercises Provided: Patient educated to continue with previously issued HEP. Includes pelvic tilts, supine hip abduction with yellow band and bridging.  Exercises were reviewed and Charmaine was able to demonstrate them prior to the end of the session. Pt received a written copy of exercises to perform at home. Charmaine demonstrated good  understanding of the education provided.     Assessment   Pt tolerated treatment well and was able to complete all exercises without increases in pain with the exception of supine therex due to too much time in a supine position. Pain decreased with increased rest breaks and changes in body position. Would benefit from continued strengthening as tolerated.    Charmaine is progressing well towards her goals and no updates to goals at this time. Will benefit from con't skilled care.    Anticipated barriers to physical therapy: none  Pt's spiritual, cultural and educational " needs considered and pt agreeable to plan of care and goals.    Plan   Continue with established Plan of Care towards Physical Therapy goals.       Melia Mendez, PT

## 2018-03-13 ENCOUNTER — CLINICAL SUPPORT (OUTPATIENT)
Dept: REHABILITATION | Facility: HOSPITAL | Age: 68
End: 2018-03-13
Payer: MEDICARE

## 2018-03-13 DIAGNOSIS — M54.41 CHRONIC BILATERAL LOW BACK PAIN WITH BILATERAL SCIATICA: Primary | ICD-10-CM

## 2018-03-13 DIAGNOSIS — G89.29 CHRONIC BILATERAL LOW BACK PAIN WITH BILATERAL SCIATICA: Primary | ICD-10-CM

## 2018-03-13 DIAGNOSIS — M54.42 CHRONIC BILATERAL LOW BACK PAIN WITH BILATERAL SCIATICA: Primary | ICD-10-CM

## 2018-03-13 PROCEDURE — 97110 THERAPEUTIC EXERCISES: CPT | Mod: PO

## 2018-03-13 NOTE — PROGRESS NOTES
"                                                    Physical Therapy Daily Note   Name: Charmaine Whalen  Clinic Number: [id number]    Date of start of care: 3/1/2018  Date of treatment: 03/13/2018   Time in: 1000  Time out: 1100  Billable time: 55 min  # Visits: 3/20  Auth: (20) 12/31/2018  POC expiration: 4/12/2018    Diagnosis:   Encounter Diagnosis   Name Primary?    Chronic bilateral low back pain with bilateral sciatica Yes     Physician: Damien Hernandez *  Treatment Orders: PT Eval and Treat    Subjective   Pt reports: Was very sore 2 days after her last session, but had company come over during the weekend and thinks that may have added to her pain.    Pain Scale:  5/10      Objective     TREATMENT: therapeutic exercise x55 min  Pelvic tilts 3x10  Long sitting piriformis stretch 2x30 sec BLE  Long sitting hamstring stretch with strap 2x30 sec BLE   DKTC with green physioball 2x15x3"   LTR with green physioball 2x10  Standing heel raises with glute sets 2x10  Standing hip abduction 2x10 BLE  Standing hamstring curls 2x10 BLE  Mini-squats at mat 2x8  Gastroc stretch on wedge 2x30 sec BLE    Written Home Exercises Provided: Patient educated to continue with previously issued HEP. Includes pelvic tilts, supine hip abduction with yellow band and bridging.  Exercises were reviewed and Charmaine was able to demonstrate them prior to the end of the session. Pt received a written copy of exercises to perform at home. Charmaine demonstrated good  understanding of the education provided.     Assessment   Pt tolerated treatment well but continued to have pain after theraball DKTC today. May omit at next session. Pain decreased with increased rest breaks and changes in body position. Would benefit from continued strengthening as tolerated.    Charmaine is progressing well towards her goals and no updates to goals at this time. Will benefit from con't skilled care.    Anticipated barriers to physical therapy: none  Pt's " spiritual, cultural and educational needs considered and pt agreeable to plan of care and goals.    Plan   Continue with established Plan of Care towards Physical Therapy goals.       Melia Mendez, PT

## 2018-03-15 ENCOUNTER — CLINICAL SUPPORT (OUTPATIENT)
Dept: REHABILITATION | Facility: HOSPITAL | Age: 68
End: 2018-03-15
Payer: MEDICARE

## 2018-03-15 DIAGNOSIS — G89.29 CHRONIC BILATERAL LOW BACK PAIN WITH BILATERAL SCIATICA: Primary | ICD-10-CM

## 2018-03-15 DIAGNOSIS — M54.42 CHRONIC BILATERAL LOW BACK PAIN WITH BILATERAL SCIATICA: Primary | ICD-10-CM

## 2018-03-15 DIAGNOSIS — M54.41 CHRONIC BILATERAL LOW BACK PAIN WITH BILATERAL SCIATICA: Primary | ICD-10-CM

## 2018-03-15 PROCEDURE — 97110 THERAPEUTIC EXERCISES: CPT | Mod: PO

## 2018-03-15 NOTE — PROGRESS NOTES
Physical Therapy Daily Note   Name: Charmaine Whalen  Clinic Number: [id number]    Date of start of care: 3/1/2018  Date of treatment: 03/15/2018   Time in: 1000  Time out: 1100  Billable time: 55 min  # Visits: 4/20  Auth: (20) 12/31/2018  POC expiration: 4/12/2018    Diagnosis:   Encounter Diagnosis   Name Primary?    Chronic bilateral low back pain with bilateral sciatica Yes     Physician: Damien Hrenandez *  Treatment Orders: PT Eval and Treat    Subjective   Pt reports: Doing a little better today.    Pain Scale:  5/10      Objective     TREATMENT: therapeutic exercise x55 min  Pelvic tilts 2x10  Long sitting piriformis stretch 2x30 sec BLE  Long sitting hamstring stretch with strap 2x30 sec BLE   LTR without physioball 2x10  Standing heel raises with glute sets 2x10  Standing hip abduction 2x10 BLE  Standing hamstring curls 2x10 BLE  Mini-squats at mat 2x8  Gastroc stretch on wedge 2x30 sec BLE    Written Home Exercises Provided: Patient educated to continue with previously issued HEP. Includes pelvic tilts, supine hip abduction with yellow band and bridging.  Exercises were reviewed and Charmaine was able to demonstrate them prior to the end of the session. Pt received a written copy of exercises to perform at home. Charmaine demonstrated good  understanding of the education provided.     Assessment   Pt tolerated treatment well with no increases in pain but reported that LTR with physioball felt better than without. Pain level was maintained at a low level with rest breaks and changes in body position. Would benefit from continued strengthening as tolerated.    Charmaine is progressing well towards her goals and no updates to goals at this time. Will benefit from con't skilled care.    Anticipated barriers to physical therapy: none  Pt's spiritual, cultural and educational needs considered and pt agreeable to plan of care and goals.    Plan   Continue with  established Plan of Care towards Physical Therapy goals.       Melia Mendez, PT

## 2018-03-20 ENCOUNTER — CLINICAL SUPPORT (OUTPATIENT)
Dept: REHABILITATION | Facility: HOSPITAL | Age: 68
End: 2018-03-20
Payer: MEDICARE

## 2018-03-20 DIAGNOSIS — M54.41 CHRONIC BILATERAL LOW BACK PAIN WITH BILATERAL SCIATICA: Primary | ICD-10-CM

## 2018-03-20 DIAGNOSIS — G89.29 CHRONIC BILATERAL LOW BACK PAIN WITH BILATERAL SCIATICA: Primary | ICD-10-CM

## 2018-03-20 DIAGNOSIS — M54.42 CHRONIC BILATERAL LOW BACK PAIN WITH BILATERAL SCIATICA: Primary | ICD-10-CM

## 2018-03-20 PROCEDURE — 97110 THERAPEUTIC EXERCISES: CPT | Mod: PO

## 2018-03-20 NOTE — PROGRESS NOTES
Physical Therapy Daily Note   Name: Charmaine Whalen  Clinic Number: [id number]    Date of start of care: 3/1/2018  Date of treatment: 03/20/2018   Time in: 0900  Time out: 1000  Billable time: 55 min  # Visits: 5/20  Auth: (20) 12/31/2018  POC expiration: 4/12/2018    Diagnosis:   Encounter Diagnosis   Name Primary?    Chronic bilateral low back pain with bilateral sciatica Yes     Physician: Damien Hernandez *  Treatment Orders: PT Eval and Treat    Subjective   Pt reports: Doing a little better today.    Pain Scale:  5/10      Objective     TREATMENT: therapeutic exercise x55 min (55 min 1-on-1)  Pelvic tilts 2x10  Long sitting piriformis stretch 2x30 sec BLE  Long sitting hamstring stretch with strap 2x30 sec BLE   LTR without physioball 2x10 (limited in motion to the L) get up to the right side next visit!  Standing heel raises with glute sets 2x10  Standing hip abduction 2x10 BLE  Standing hamstring curls 2x10 BLE  Mini-squats at mat 2x8  Gastroc stretch on wedge 2x30 sec BLE    Written Home Exercises Provided: Patient educated to continue with previously issued HEP. Includes pelvic tilts, supine hip abduction with yellow band and bridging.  Exercises were reviewed and Charmaine was able to demonstrate them prior to the end of the session. Pt received a written copy of exercises to perform at home. Charmaine demonstrated good  understanding of the education provided.     Assessment   Pt tolerated treatment well with no increases in pain except when going from supine to sitting. This may be due to pain with left lumbar rotation and pt is to attempt rising to a seated position going to her right next time. Pain level was maintained at a low level with rest breaks and changes in body position. Would benefit from continued strengthening as tolerated.    Charmaine is progressing well towards her goals and no updates to goals at this time. Will benefit from con't  skilled care.    Anticipated barriers to physical therapy: none  Pt's spiritual, cultural and educational needs considered and pt agreeable to plan of care and goals.    Plan   Continue with established Plan of Care towards Physical Therapy goals.       Melia Mendez, PT

## 2018-03-22 ENCOUNTER — CLINICAL SUPPORT (OUTPATIENT)
Dept: REHABILITATION | Facility: HOSPITAL | Age: 68
End: 2018-03-22
Payer: MEDICARE

## 2018-03-22 DIAGNOSIS — M54.41 CHRONIC BILATERAL LOW BACK PAIN WITH BILATERAL SCIATICA: Primary | ICD-10-CM

## 2018-03-22 DIAGNOSIS — G89.29 CHRONIC BILATERAL LOW BACK PAIN WITH BILATERAL SCIATICA: Primary | ICD-10-CM

## 2018-03-22 DIAGNOSIS — M54.42 CHRONIC BILATERAL LOW BACK PAIN WITH BILATERAL SCIATICA: Primary | ICD-10-CM

## 2018-03-22 PROCEDURE — 97110 THERAPEUTIC EXERCISES: CPT | Mod: PO

## 2018-03-22 NOTE — PROGRESS NOTES
Physical Therapy Daily Note   Name: Charmaine Whalen  Clinic Number: [id number]    Date of start of care: 3/1/2018  Date of treatment: 03/22/2018   Time in: 0900  Time out: 1000  Billable time: 55 min  # Visits: 6/20  Auth: (20) 12/31/2018  POC expiration: 4/12/2018    Diagnosis:   Encounter Diagnosis   Name Primary?    Chronic bilateral low back pain with bilateral sciatica Yes     Physician: Damien Hernandez *  Treatment Orders: PT Eval and Treat    Subjective   Pt reports: Doing a little better today.    Pain Scale:  5/10      Objective     TREATMENT: therapeutic exercise x55 min (55 min 1-on-1)  Pelvic tilts 2x10  Long sitting piriformis stretch 2x30 sec BLE  Long sitting hamstring stretch with strap 2x30 sec BLE   LTR without physioball 2x10 (limited in motion to the L) get up to the right side!  Standing heel raises with glute sets 2x10  Standing hip abduction 2x12 BLE  Standing hamstring curls 2x12 BLE  Mini-squats at mat 2x10  Gastroc stretch on wedge 1x30 sec BLE, 1x45 sec BLE    Written Home Exercises Provided: Patient educated to continue with previously issued HEP. Includes pelvic tilts, supine hip abduction with yellow band and bridging.  Exercises were reviewed and Charmaine was able to demonstrate them prior to the end of the session. Pt received a written copy of exercises to perform at home. Charmaine demonstrated good  understanding of the education provided.     Assessment   Pt tolerated treatment well with decreased pain when getting up to her right side. Tolerated progressions well today. Pain level was maintained at a low level with rest breaks and changes in body position. Would benefit from continued strengthening as tolerated.    Charmaine is progressing well towards her goals and no updates to goals at this time. Will benefit from con't skilled care.    Anticipated barriers to physical therapy: none  Pt's spiritual, cultural and educational  needs considered and pt agreeable to plan of care and goals.    Plan   Continue with established Plan of Care towards Physical Therapy goals.       Melia Mendez, PT

## 2018-03-27 ENCOUNTER — CLINICAL SUPPORT (OUTPATIENT)
Dept: REHABILITATION | Facility: HOSPITAL | Age: 68
End: 2018-03-27
Payer: MEDICARE

## 2018-03-27 DIAGNOSIS — M54.41 CHRONIC BILATERAL LOW BACK PAIN WITH BILATERAL SCIATICA: Primary | ICD-10-CM

## 2018-03-27 DIAGNOSIS — M54.42 CHRONIC BILATERAL LOW BACK PAIN WITH BILATERAL SCIATICA: Primary | ICD-10-CM

## 2018-03-27 DIAGNOSIS — G89.29 CHRONIC BILATERAL LOW BACK PAIN WITH BILATERAL SCIATICA: Primary | ICD-10-CM

## 2018-03-27 NOTE — PROGRESS NOTES
Physical Therapy Daily Note   Name: Charmaine Whalen  Clinic Number: [id number]    Date of start of care: 3/1/2018  Date of treatment: 03/27/2018   Time in: 0900  Time out: 1000  Billable time: 55 min  # Visits: 7/20  Auth: (20) 12/31/2018  POC expiration: 4/12/2018    Diagnosis:   No diagnosis found.  Physician: Damien Hernandez *  Treatment Orders: PT Eval and Treat    Subjective   Pt reports: Doing a little better today.    Pain Scale:  5/10      Objective     TREATMENT: therapeutic exercise x55 min (55 min 1-on-1)  Pelvic tilts 2x10  Long sitting piriformis stretch 2x30 sec BLE  Long sitting hamstring stretch with strap 2x30 sec BLE   LTR without physioball 2x10 (limited in motion to the L) get up to the right side!  Standing heel raises with glute sets 2x10  Standing hip abduction 2x12 BLE  Standing hamstring curls 2x12 BLE  Mini-squats at mat 2x10  Gastroc stretch on wedge 2x30 sec BLE    Written Home Exercises Provided: Patient educated to continue with previously issued HEP. Includes pelvic tilts, supine hip abduction with yellow band and bridging.  Exercises were reviewed and Charmaine was able to demonstrate them prior to the end of the session. Pt received a written copy of exercises to perform at home. Charmaine demonstrated good  understanding of the education provided.     Assessment   Pt tolerated treatment well with decreased pain when getting up to her right side. Tolerated progressions well today. Pain level was maintained at a low level with rest breaks and changes in body position. Would benefit from continued strengthening as tolerated.    Charmaine is progressing well towards her goals and no updates to goals at this time. Will benefit from con't skilled care.    Anticipated barriers to physical therapy: none  Pt's spiritual, cultural and educational needs considered and pt agreeable to plan of care and goals.    Plan   Continue with established  Plan of Care towards Physical Therapy goals.       Melia Mendez, PT

## 2018-03-29 ENCOUNTER — TELEPHONE (OUTPATIENT)
Dept: REHABILITATION | Facility: HOSPITAL | Age: 68
End: 2018-03-29

## 2018-03-29 ENCOUNTER — TELEPHONE (OUTPATIENT)
Dept: FAMILY MEDICINE | Facility: CLINIC | Age: 68
End: 2018-03-29

## 2018-03-29 RX ORDER — TIZANIDINE 2 MG/1
TABLET ORAL
Qty: 30 TABLET | Refills: 1 | Status: SHIPPED | OUTPATIENT
Start: 2018-03-29 | End: 2018-10-19

## 2018-03-29 NOTE — TELEPHONE ENCOUNTER
Spoke with pt, scheduled f/u for Thursday 4/5 at 1:40.  Pt states she received flexeril from Er, and she took half a pill last night but it kept her up all night.  Pt states she would like to have an alternative If there is one that may not give her insomnia, states if so she would only need a few pills.  States she is sure she will not need it for long. Please advise.

## 2018-03-29 NOTE — TELEPHONE ENCOUNTER
Spoke w/ pt. Informed pt about medication changes and recommendations per provider. pt verbalized understanding.

## 2018-03-29 NOTE — TELEPHONE ENCOUNTER
I will send in a prescription for Zanaflex which is a different muscle relaxer.  I would not expect this to cause insomnia but nor would I have expected the flexeril to cause insomnia.  So try with caution as with any new medication.

## 2018-03-29 NOTE — TELEPHONE ENCOUNTER
If possible have her follow up next week.  If she is having a lot of muscle tightness and feels she may benefit from a muscle relaxer I can send something in.  She may wish to take ibuprofen or Aleve regularly for at least a few days to help keep the pain under control.
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
## 2018-04-05 ENCOUNTER — OFFICE VISIT (OUTPATIENT)
Dept: FAMILY MEDICINE | Facility: CLINIC | Age: 68
End: 2018-04-05
Payer: MEDICARE

## 2018-04-05 VITALS
HEART RATE: 64 BPM | SYSTOLIC BLOOD PRESSURE: 116 MMHG | BODY MASS INDEX: 31.59 KG/M2 | WEIGHT: 201.25 LBS | DIASTOLIC BLOOD PRESSURE: 70 MMHG | OXYGEN SATURATION: 99 % | HEIGHT: 67 IN | TEMPERATURE: 99 F

## 2018-04-05 DIAGNOSIS — S39.012A STRAIN OF LUMBAR REGION, INITIAL ENCOUNTER: Primary | ICD-10-CM

## 2018-04-05 DIAGNOSIS — M54.31 RIGHT SIDED SCIATICA: ICD-10-CM

## 2018-04-05 DIAGNOSIS — S16.1XXA STRAIN OF NECK MUSCLE, INITIAL ENCOUNTER: ICD-10-CM

## 2018-04-05 DIAGNOSIS — F32.A DEPRESSION, UNSPECIFIED DEPRESSION TYPE: ICD-10-CM

## 2018-04-05 PROCEDURE — 99214 OFFICE O/P EST MOD 30 MIN: CPT | Mod: S$PBB,,, | Performed by: FAMILY MEDICINE

## 2018-04-05 PROCEDURE — 99999 PR PBB SHADOW E&M-EST. PATIENT-LVL IV: CPT | Mod: PBBFAC,,, | Performed by: FAMILY MEDICINE

## 2018-04-05 PROCEDURE — 99214 OFFICE O/P EST MOD 30 MIN: CPT | Mod: PBBFAC,PN | Performed by: FAMILY MEDICINE

## 2018-04-05 RX ORDER — ZOSTER VACCINE RECOMBINANT, ADJUVANTED 50 MCG/0.5
KIT INTRAMUSCULAR
COMMUNITY
Start: 2018-03-29 | End: 2018-04-05 | Stop reason: CLARIF
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
## 2018-04-09 ENCOUNTER — OFFICE VISIT (OUTPATIENT)
Dept: OPHTHALMOLOGY | Facility: CLINIC | Age: 68
End: 2018-04-09
Payer: MEDICARE

## 2018-04-09 DIAGNOSIS — H33.302 RETINAL BREAK OF LEFT EYE: ICD-10-CM

## 2018-04-09 DIAGNOSIS — H35.413 LATTICE DEGENERATION OF PERIPHERAL RETINA, BILATERAL: Primary | ICD-10-CM

## 2018-04-09 DIAGNOSIS — H43.813 POSTERIOR VITREOUS DETACHMENT OF BOTH EYES: ICD-10-CM

## 2018-04-09 PROCEDURE — 92014 COMPRE OPH EXAM EST PT 1/>: CPT | Mod: S$PBB,,, | Performed by: OPHTHALMOLOGY

## 2018-04-09 PROCEDURE — 92225 PR SPECIAL EYE EXAM, INITIAL: CPT | Mod: 50,PBBFAC,PO | Performed by: OPHTHALMOLOGY

## 2018-04-09 PROCEDURE — 99999 PR PBB SHADOW E&M-EST. PATIENT-LVL II: CPT | Mod: PBBFAC,,, | Performed by: OPHTHALMOLOGY

## 2018-04-09 PROCEDURE — 99212 OFFICE O/P EST SF 10 MIN: CPT | Mod: PBBFAC,PO | Performed by: OPHTHALMOLOGY

## 2018-04-09 PROCEDURE — 92225 PR SPECIAL EYE EXAM, INITIAL: CPT | Mod: 50,S$PBB,, | Performed by: OPHTHALMOLOGY
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
## 2018-04-10 ENCOUNTER — CLINICAL SUPPORT (OUTPATIENT)
Dept: REHABILITATION | Facility: HOSPITAL | Age: 68
End: 2018-04-10
Payer: MEDICARE

## 2018-04-10 DIAGNOSIS — G89.29 CHRONIC BILATERAL LOW BACK PAIN WITH BILATERAL SCIATICA: Primary | ICD-10-CM

## 2018-04-10 DIAGNOSIS — M54.41 CHRONIC BILATERAL LOW BACK PAIN WITH BILATERAL SCIATICA: Primary | ICD-10-CM

## 2018-04-10 DIAGNOSIS — M54.42 CHRONIC BILATERAL LOW BACK PAIN WITH BILATERAL SCIATICA: Primary | ICD-10-CM

## 2018-04-10 PROCEDURE — 97110 THERAPEUTIC EXERCISES: CPT | Mod: PO

## 2018-04-10 PROCEDURE — G8978 MOBILITY CURRENT STATUS: HCPCS | Mod: CK,PO

## 2018-04-10 PROCEDURE — G8979 MOBILITY GOAL STATUS: HCPCS | Mod: CK,PO

## 2018-04-10 NOTE — PROGRESS NOTES
Physical Therapy Daily Note   Name: Charmaine Whalen  Clinic Number: [id number]    Date of start of care: 3/1/2018  Date of treatment: 04/10/2018   Time in: 0900  Time out: 1000  Billable time: 55 min  # Visits: 1/20  Auth: (20) 12/31/2018  POC expiration: 4/12/2018    Diagnosis:   Encounter Diagnosis   Name Primary?    Chronic bilateral low back pain with bilateral sciatica Yes     Physician: Damien Hernandez *  Treatment Orders: PT Eval and Treat    Subjective   Pt reports: Is now getting right leg pain, but now gets neck pain and headaches. Reports that her headaches are pretty random.    Pain Scale:  5/10      Objective     Cervical AROM screen WNL except for side bending which was equally restricted to 50% with tightness and L rotation which was restricted to 50% and had pain    TREATMENT: therapeutic exercise x55 min (55 min 1-on-1)  Pelvic tilts 2x10 (only able to do 17)  Long sitting piriformis stretch 2x30 sec BLE  Long sitting hamstring stretch with strap 2x30 sec BLE   LTR without physioball 2x10 (limited in motion to the L) get up to the right side!  Standing heel raises with glute sets 2x10  Standing hip abduction 2x10 BLE  Standing hamstring curls 2x10 BLE  Mini-squats at mat 2x8 (not performed due to pain)  Gastroc stretch on wedge 2x30 sec BLE  Upper trap stretches (no hand on head) 2x30 sec  Levator scapula stretches (no hand on head) 2x30 sec    Written Home Exercises Provided: Patient educated to continue with previously issued HEP. Includes pelvic tilts, supine hip abduction with yellow band and bridging.  Exercises were reviewed and Charmaine was able to demonstrate them prior to the end of the session. Pt received a written copy of exercises to perform at home. Charmaine demonstrated good  understanding of the education provided.     Assessment   Pt tolerated treatment well with decreased pain when getting up to her right side. Increased  exercises to include neck stretches due to pain and limited AROM. Pain level was maintained at a low level with rest breaks and changes in body position. Would benefit from continued strengthening as tolerated.    Charmaine is progressing well towards her goals and no updates to goals at this time. Will benefit from con't skilled care.    Anticipated barriers to physical therapy: none  Pt's spiritual, cultural and educational needs considered and pt agreeable to plan of care and goals.    Plan   Continue with established Plan of Care towards Physical Therapy goals.       Melia Mendez, PT

## 2018-04-12 ENCOUNTER — CLINICAL SUPPORT (OUTPATIENT)
Dept: REHABILITATION | Facility: HOSPITAL | Age: 68
End: 2018-04-12
Payer: MEDICARE

## 2018-04-12 DIAGNOSIS — G89.29 CHRONIC BILATERAL LOW BACK PAIN WITH BILATERAL SCIATICA: Primary | ICD-10-CM

## 2018-04-12 DIAGNOSIS — M54.41 CHRONIC BILATERAL LOW BACK PAIN WITH BILATERAL SCIATICA: Primary | ICD-10-CM

## 2018-04-12 DIAGNOSIS — M54.42 CHRONIC BILATERAL LOW BACK PAIN WITH BILATERAL SCIATICA: Primary | ICD-10-CM

## 2018-04-12 PROCEDURE — 97110 THERAPEUTIC EXERCISES: CPT | Mod: PO

## 2018-04-12 NOTE — PROGRESS NOTES
Physical Therapy Daily Note   Name: Charmaine Whalen  Clinic Number: [id number]    Date of start of care: 3/1/2018  Date of treatment: 04/12/2018   Time in: 0900  Time out: 1000  Billable time: 55 min  # Visits: 1/20  Auth: (20) 12/31/2018  POC expiration: 4/12/2018    Diagnosis:   Encounter Diagnosis   Name Primary?    Chronic bilateral low back pain with bilateral sciatica Yes     Physician: Damien Hernandez *  Treatment Orders: PT Eval and Treat    Subjective   Pt reports: Is now getting right leg pain, but now gets neck pain and headaches. Reports that her headaches are pretty random.    Pain Scale:  5/10      Objective     Cervical AROM screen WNL except for side bending which was equally restricted to 50% with tightness and L rotation which was restricted to 50% and had pain    TREATMENT: therapeutic exercise x55 min (55 min 1-on-1)  Pelvic tilts 2x10 (only able to do 17)  Long sitting piriformis stretch 2x30 sec BLE  Long sitting hamstring stretch with strap 2x30 sec BLE   LTR without physioball 2x10 (limited in motion to the L with pain) get up to the right side!  Standing heel raises with glute sets 2x10  Standing hip abduction 2x10 BLE  Standing hamstring curls 2x10 BLE  Mini-squats at mat 2x8   Gastroc stretch on wedge 2x30 sec BLE  Upper trap stretches (no hand on head) 2x30 sec  Levator scapula stretches (no hand on head) 2x30 sec    Written Home Exercises Provided: Patient educated to continue with previously issued HEP. Includes pelvic tilts, supine hip abduction with yellow band and bridging.  Exercises were reviewed and Charmaine was able to demonstrate them prior to the end of the session. Pt received a written copy of exercises to perform at home. Charmaine demonstrated good  understanding of the education provided.     Assessment   Pt tolerated treatment well with decreased pain when getting up to her right side. Increased exercises to include  neck stretches due to pain and limited AROM. Pain level was maintained at a low level with rest breaks and changes in body position. Would benefit from continued strengthening as tolerated.    Charmaine is progressing well towards her goals and no updates to goals at this time. Will benefit from con't skilled care.    Anticipated barriers to physical therapy: none  Pt's spiritual, cultural and educational needs considered and pt agreeable to plan of care and goals.    Plan   Continue with established Plan of Care towards Physical Therapy goals.       Melia Mendez, PT

## 2018-04-19 ENCOUNTER — CLINICAL SUPPORT (OUTPATIENT)
Dept: REHABILITATION | Facility: HOSPITAL | Age: 68
End: 2018-04-19
Payer: MEDICARE

## 2018-04-19 DIAGNOSIS — M54.41 CHRONIC BILATERAL LOW BACK PAIN WITH BILATERAL SCIATICA: Primary | ICD-10-CM

## 2018-04-19 DIAGNOSIS — M54.42 CHRONIC BILATERAL LOW BACK PAIN WITH BILATERAL SCIATICA: Primary | ICD-10-CM

## 2018-04-19 DIAGNOSIS — G89.29 CHRONIC BILATERAL LOW BACK PAIN WITH BILATERAL SCIATICA: Primary | ICD-10-CM

## 2018-04-19 PROCEDURE — G8979 MOBILITY GOAL STATUS: HCPCS | Mod: CK,PO | Performed by: PHYSICAL THERAPIST

## 2018-04-19 PROCEDURE — G8978 MOBILITY CURRENT STATUS: HCPCS | Mod: CK,PO | Performed by: PHYSICAL THERAPIST

## 2018-04-19 RX ORDER — LEVOTHYROXINE SODIUM 100 UG/1
TABLET ORAL
Qty: 90 TABLET | Refills: 0 | Status: SHIPPED | OUTPATIENT
Start: 2018-04-19 | End: 2018-07-17 | Stop reason: SDUPTHER

## 2018-04-19 NOTE — PROGRESS NOTES
Physical Therapy Daily Note   Name: Charmaine Whalen  Clinic Number: [id number]    Date of start of care: 3/1/2018  Date of treatment: 04/19/2018   Time in: 1000  Time out: 1055  Billable time: 30 min  # Visits: 11/20  Auth: (20) 12/31/2018  POC expiration: 4/12/2018    Diagnosis:   Encounter Diagnosis   Name Primary?    Chronic bilateral low back pain with bilateral sciatica Yes     Physician: Shashi Villanueva, *  Treatment Orders: PT Eval and Treat    Subjective   Pt reports: She is hurting today, but not too bad. States her most painful position is lying down.     Pain Scale: 2/10      Objective       TREATMENT: therapeutic exercise x 55 min    Glute sets, 10x supine, 10x sitting   Neural flossing sciatic nerve in sitting with lower leg resting on stool, heel floating.  LUmbar extension in sitting with red bolster at T/L junction, 3x10  Seated HSS, 3x20s  Standing hip abduction 3x10 BLE  Standing hamstring curls 2x10 BLE  Mini-squats at mat 2x10    Gastroc stretch on wedge 2x30 sec BLE  Precor HSC, 20#, 10x  Precor LE Ext, 20#, 10x  TA setting in standing, pressing UE on top of ball, 20x5s      Written Home Exercises Provided: Patient educated to continue with previously issued HEP. Includes pelvic tilts, supine hip abduction with yellow band and bridging.  Exercises were reviewed and Charmaine was able to demonstrate them prior to the end of the session. Pt received a written copy of exercises to perform at home. Charmaine demonstrated good  understanding of the education provided.     Assessment   Pt presents with extension bias (flexion pain pattern). She will benefit from avoiding flexion based exercises until her pain is more well controlled.     Charmaine is progressing well towards her goals and no updates to goals at this time. Will benefit from con't skilled care.    Anticipated barriers to physical therapy: none  Pt's spiritual, cultural and educational  needs considered and pt agreeable to plan of care and goals.    Plan   Continue with established Plan of Care towards Physical Therapy goals.       Yvrose Hu, PT

## 2018-04-22 NOTE — PLAN OF CARE
TIME RECORD    Date: 4/19/18    PHYSICAL THERAPY UPDATED PLAN OF TREATMENT    Patient name: Charmaine Whalen  Onset Date:  chronic  SOC Date:  3/1/18  Primary Diagnosis:    1. Chronic bilateral low back pain with bilateral sciatica       Treatment Diagnosis:  Low back pain  Certification Period:  4/19/18 to 4/26/18  Precautions:  Fibromyalgia, h/o lumbar fusion, spine stimulator  Visits from SOC:  11  Functional Level Prior to SOC:  I    Subjective: Continues with low back pain and weakness in BLEs. Limited in tolerance for most functional activities.     Objective:     Pt is unable to tolerate PPT positioning.     Lumbar flexion is limited due to pain.    Lumbar ext is 15* without pain.    Gross BLE strength 4/5.       (50% disability (based on clinical observation), Gcodes current CK, goal CK)   CH 0 percent impaired, limited or restricted  CI At least 1 percent but less than 20 percent impaired, limited or restricted  CJ At least 20 percent but less than 40 percent impaired, limited or restricted  CK At least 40 percent but less than 60 percent impaired, limited or restricted  CL At least 60 percent but less than 80 percent impaired, limited or restricted  CM At least 80 percent but less than 100 percent impaired, limited or restricted   percent impaired, limited or restricted      Updated Assessment:  Pt continues to be limited due to decreased endurance, decrease LE strength, and painful AROM of the lumbar spine. Pt will benefit from a focus on extension based exercises, as this is her preferred movement pattern, and work into lumbar flexion exercises once current pain level is better controlled and strength has improved.     Previous Short Term Goals Status:   Not met  New Short Term Goals Status:   Continue per primary PT original POC.  Long Term Goal Status:   continue per initial plan of care (per primary PT).  Reasons for Recertification of Therapy:   Continued limitations in daily activities due  to weakness and lack of functional mobility of the lumbar spine.     Certification Period: 4/19/18 to 4/26/18  Recommended Treatment Plan: 2 times per week for 1 week, PRIMARY PT WILL THEN PERFORM ANOTHER UPDATED PT POC AS PER DISCUSSION WITH THIS PATIENT ON INITIAL EVALUATION: Gait Training, Manual Therapy, Moist Heat/ Ice, Neuromuscular Re-ed, Patient Education, Therapeutic Activites and Therapeutic Exercise. PTA may be involved in care for this patient under direction of PT.     Other Recommendations: AS PER PRIMARY PT.         Therapist's Name: Yvrose Hu PT, DPT, MTC   Date: 04/22/2018    I CERTIFY THE NEED FOR THESE SERVICES FURNISHED UNDER THIS PLAN OF TREATMENT AND WHILE UNDER MY CARE    Physician's comments: ________________________________________________________________________________________________________________________________________________      Physician's Name: ___________________________________

## 2018-04-24 ENCOUNTER — CLINICAL SUPPORT (OUTPATIENT)
Dept: REHABILITATION | Facility: HOSPITAL | Age: 68
End: 2018-04-24
Payer: MEDICARE

## 2018-04-24 DIAGNOSIS — M54.42 CHRONIC BILATERAL LOW BACK PAIN WITH BILATERAL SCIATICA: Primary | ICD-10-CM

## 2018-04-24 DIAGNOSIS — M54.41 CHRONIC BILATERAL LOW BACK PAIN WITH BILATERAL SCIATICA: Primary | ICD-10-CM

## 2018-04-24 DIAGNOSIS — G89.29 CHRONIC BILATERAL LOW BACK PAIN WITH BILATERAL SCIATICA: Primary | ICD-10-CM

## 2018-04-24 PROCEDURE — 97110 THERAPEUTIC EXERCISES: CPT | Mod: PO
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
## 2018-05-04 ENCOUNTER — OFFICE VISIT (OUTPATIENT)
Dept: FAMILY MEDICINE | Facility: CLINIC | Age: 68
End: 2018-05-04
Payer: MEDICARE

## 2018-05-04 VITALS
SYSTOLIC BLOOD PRESSURE: 126 MMHG | HEART RATE: 69 BPM | BODY MASS INDEX: 31.26 KG/M2 | DIASTOLIC BLOOD PRESSURE: 84 MMHG | WEIGHT: 199.19 LBS | TEMPERATURE: 99 F | OXYGEN SATURATION: 99 % | HEIGHT: 67 IN

## 2018-05-04 DIAGNOSIS — M54.31 RIGHT SIDED SCIATICA: ICD-10-CM

## 2018-05-04 DIAGNOSIS — F32.A DEPRESSION, UNSPECIFIED DEPRESSION TYPE: ICD-10-CM

## 2018-05-04 DIAGNOSIS — S16.1XXA STRAIN OF NECK MUSCLE, INITIAL ENCOUNTER: ICD-10-CM

## 2018-05-04 DIAGNOSIS — S39.012A STRAIN OF LUMBAR REGION, INITIAL ENCOUNTER: Primary | ICD-10-CM

## 2018-05-04 PROCEDURE — 99213 OFFICE O/P EST LOW 20 MIN: CPT | Mod: PBBFAC,PN | Performed by: FAMILY MEDICINE

## 2018-05-04 PROCEDURE — 99999 PR PBB SHADOW E&M-EST. PATIENT-LVL III: CPT | Mod: PBBFAC,,, | Performed by: FAMILY MEDICINE

## 2018-05-04 PROCEDURE — 99214 OFFICE O/P EST MOD 30 MIN: CPT | Mod: S$PBB,,, | Performed by: FAMILY MEDICINE
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
## 2018-05-07 ENCOUNTER — OFFICE VISIT (OUTPATIENT)
Dept: PSYCHIATRY | Facility: CLINIC | Age: 68
End: 2018-05-07
Payer: MEDICARE

## 2018-05-07 DIAGNOSIS — F41.1 GAD (GENERALIZED ANXIETY DISORDER): Primary | ICD-10-CM

## 2018-05-07 DIAGNOSIS — G47.09 OTHER INSOMNIA: ICD-10-CM

## 2018-05-07 DIAGNOSIS — F33.1 MDD (MAJOR DEPRESSIVE DISORDER), RECURRENT EPISODE, MODERATE: ICD-10-CM

## 2018-05-07 PROCEDURE — 99999 PR PBB SHADOW E&M-EST. PATIENT-LVL I: CPT | Mod: PBBFAC,,, | Performed by: SOCIAL WORKER

## 2018-05-07 PROCEDURE — 90791 PSYCH DIAGNOSTIC EVALUATION: CPT | Mod: PBBFAC,PO | Performed by: SOCIAL WORKER

## 2018-05-07 PROCEDURE — 90791 PSYCH DIAGNOSTIC EVALUATION: CPT | Mod: S$PBB,,, | Performed by: SOCIAL WORKER

## 2018-05-07 PROCEDURE — 99211 OFF/OP EST MAY X REQ PHY/QHP: CPT | Mod: PBBFAC,PO,25 | Performed by: SOCIAL WORKER
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
## 2018-05-08 ENCOUNTER — CLINICAL SUPPORT (OUTPATIENT)
Dept: REHABILITATION | Facility: HOSPITAL | Age: 68
End: 2018-05-08
Payer: MEDICARE

## 2018-05-08 DIAGNOSIS — M54.42 CHRONIC BILATERAL LOW BACK PAIN WITH BILATERAL SCIATICA: Primary | ICD-10-CM

## 2018-05-08 DIAGNOSIS — M54.41 CHRONIC BILATERAL LOW BACK PAIN WITH BILATERAL SCIATICA: Primary | ICD-10-CM

## 2018-05-08 DIAGNOSIS — G89.29 CHRONIC BILATERAL LOW BACK PAIN WITH BILATERAL SCIATICA: Primary | ICD-10-CM

## 2018-05-08 PROCEDURE — 97110 THERAPEUTIC EXERCISES: CPT | Mod: PO
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
## 2018-05-08 NOTE — PROGRESS NOTES
Physical Therapy Daily Note   Name: Charmaine Whalen  Clinic Number: [id number]    Date of start of care: 3/1/2018  Date of treatment: 05/08/2018   Time in: 0900  Time out: 1000  Billable time: 30 min  # Visits: 12/20  Auth: ([email or web address]) 12/31/2018  POC expiration: 6/5/2018    Diagnosis:   Encounter Diagnosis   Name Primary?    Chronic bilateral low back pain with bilateral sciatica Yes     Physician: Damien Hernandez *  Treatment Orders: PT Eval and Treat    Subjective   Pt reports: Feeling a little better than last time, but still getting headaches. Feeling about the same in her low back with increased pain on her right side. Unable to attend last week due to scheduling conflicts.    Pain Scale: 2/10      Objective     TREATMENT: therapeutic exercise x 55 min    Glute sets, 10x supine, 10x sitting   Neural flossing sciatic nerve in sitting with lower leg resting on stool, heel floating. 2x10 BLE  Lumbar extension in sitting with red bolster at T/L junction, x15  Seated HSS, 3x20s  Standing hip abduction 3x10 BLE  Standing hamstring curls 2x10 BLE  Mini-squats at mat 2x10    Gastroc stretch on wedge 2x30 sec BLE  Precor HSC, 20#, 20x  Precor LE Ext, 20#, 20x  TA setting in standing, pressing UE on top of ball, 20x5s (not performed 5/8/2018)    Written Home Exercises Provided: Patient educated to continue with previously issued HEP. Includes pelvic tilts, supine hip abduction with yellow band and bridging.  Exercises were reviewed and Charmaine was able to demonstrate them prior to the end of the session. Pt received a written copy of exercises to perform at home. Charmaine demonstrated good  understanding of the education provided.     Assessment   Pt presents with extension bias (flexion pain pattern). She will benefit from avoiding flexion based exercises until her pain is more well controlled. Required increased rest breaks due to fatigue with lower extremity exercise  today. Was able to increase hamstring curl and leg extension machine by 1 set of 10 today without increases of pain in-clinic.    Charmaine is progressing well towards her goals and no updates to goals at this time. Will benefit from con't skilled care.    Anticipated barriers to physical therapy: none  Pt's spiritual, cultural and educational needs considered and pt agreeable to plan of care and goals.    Plan   Continue with established Plan of Care towards Physical Therapy goals.       Melia Mendez, PT

## 2018-05-09 ENCOUNTER — TELEPHONE (OUTPATIENT)
Dept: RESEARCH | Facility: OTHER | Age: 68
End: 2018-05-09

## 2018-05-09 NOTE — TELEPHONE ENCOUNTER
----- Message from Adan Samuels MD sent at 5/9/2018 11:08 AM CDT -----  Yes, please contact  ----- Message -----  From: Eva Wyatt  Sent: 5/9/2018  10:32 AM  To: Adan Samuels MD    Possible Duty cycle?

## 2018-05-09 NOTE — TELEPHONE ENCOUNTER
Called and spoke to patient about clinical trial Prospective Clinical Trial of Duty Cycled Stimulation Using the [employer] Spinal Cord Stimulation (SCS) System (DOSE) scheduled apt for May 24 @1pm

## 2018-05-10 ENCOUNTER — TELEPHONE (OUTPATIENT)
Dept: RESEARCH | Facility: OTHER | Age: 68
End: 2018-05-10

## 2018-05-10 ENCOUNTER — CLINICAL SUPPORT (OUTPATIENT)
Dept: REHABILITATION | Facility: HOSPITAL | Age: 68
End: 2018-05-10
Payer: MEDICARE

## 2018-05-10 DIAGNOSIS — M54.42 CHRONIC BILATERAL LOW BACK PAIN WITH BILATERAL SCIATICA: Primary | ICD-10-CM

## 2018-05-10 DIAGNOSIS — M54.41 CHRONIC BILATERAL LOW BACK PAIN WITH BILATERAL SCIATICA: Primary | ICD-10-CM

## 2018-05-10 DIAGNOSIS — G89.29 CHRONIC BILATERAL LOW BACK PAIN WITH BILATERAL SCIATICA: Primary | ICD-10-CM

## 2018-05-10 DIAGNOSIS — G89.29 CHRONIC MIDLINE LOW BACK PAIN, WITH SCIATICA PRESENCE UNSPECIFIED: ICD-10-CM

## 2018-05-10 DIAGNOSIS — M54.5 CHRONIC MIDLINE LOW BACK PAIN, WITH SCIATICA PRESENCE UNSPECIFIED: ICD-10-CM

## 2018-05-10 PROCEDURE — 97110 THERAPEUTIC EXERCISES: CPT | Mod: PO

## 2018-05-10 NOTE — PROGRESS NOTES
Physical Therapy Daily Note   Name: Charmaine Whalen  Clinic Number: [id number]    Date of start of care: 3/1/2018  Date of treatment: 05/10/2018   Time in: 0900  Time out: 1000  Billable time: 30 min  # Visits: 12/20  Auth: (20) 12/31/2018  POC expiration: 6/5/2018    Diagnosis:   Encounter Diagnoses   Name Primary?    Chronic bilateral low back pain with bilateral sciatica Yes    Chronic midline low back pain, with sciatica presence unspecified      Physician: Damien Hernandez *  Treatment Orders: PT Eval and Treat    Subjective   Pt reports: Had excruciating pain in her back last night from standing and cooking. Still getting headaches on and off. Is going to be in a clinical study focusing on adjustments to her spinal cord stimulator. Requests discontinuation of PT after next week. Requests cutting today's visit short by 20 min due to pain. Refused offers of pain-modulating modalities at end of treatment in favor of rest.    Pain Scale: 1/10 on arrival, 10/10 with lying down with first exercise. 8/10 pain level at end of treatment.      Objective     TREATMENT: therapeutic exercise x 40 min    Glute sets, 10x supine, 10x sitting   Neural flossing sciatic nerve in sitting with lower leg resting on stool, heel floating. 2x10 BLE  Seated HSS, 3x20s  Standing hip abduction 2x10 BLE  Standing hamstring curls 2x10 BLE  Mini-squats at mat 2x10    Precor HSC, 20#, 20x  Precor LE Ext, 20#, 20x      Written Home Exercises Provided: Patient educated to continue with previously issued HEP. Includes pelvic tilts, supine hip abduction with yellow band and bridging.  Exercises were reviewed and Charmaine was able to demonstrate them prior to the end of the session. Pt received a written copy of exercises to perform at home. Charmaine demonstrated good  understanding of the education provided.     Assessment   Pt presents with extension bias (flexion pain pattern). She will  benefit from avoiding flexion based exercises until her pain is more well controlled. Required increased rest breaks due to fatigue and pain with lower extremity exercise today. Was unable to tolerate her full visit today due to pain and would benefit from continuation at next visit if tolerated. At patient's request, she is to be considered for discharge with HEP at the end of her visits due to lack of progress and continued pain.    Charmaine is progressing well towards her goals and no updates to goals at this time. Will benefit from con't skilled care.    Anticipated barriers to physical therapy: none  Pt's spiritual, cultural and educational needs considered and pt agreeable to plan of care and goals.    Plan   Continue with established Plan of Care towards Physical Therapy goals.       Melia Mendez, PT

## 2018-05-17 ENCOUNTER — CLINICAL SUPPORT (OUTPATIENT)
Dept: REHABILITATION | Facility: HOSPITAL | Age: 68
End: 2018-05-17
Payer: MEDICARE

## 2018-05-17 DIAGNOSIS — M54.42 CHRONIC BILATERAL LOW BACK PAIN WITH BILATERAL SCIATICA: Primary | ICD-10-CM

## 2018-05-17 DIAGNOSIS — M54.5 CHRONIC MIDLINE LOW BACK PAIN, WITH SCIATICA PRESENCE UNSPECIFIED: ICD-10-CM

## 2018-05-17 DIAGNOSIS — M54.41 CHRONIC BILATERAL LOW BACK PAIN WITH BILATERAL SCIATICA: Primary | ICD-10-CM

## 2018-05-17 DIAGNOSIS — G89.29 CHRONIC BILATERAL LOW BACK PAIN WITH BILATERAL SCIATICA: Primary | ICD-10-CM

## 2018-05-17 DIAGNOSIS — G89.29 CHRONIC MIDLINE LOW BACK PAIN, WITH SCIATICA PRESENCE UNSPECIFIED: ICD-10-CM

## 2018-05-17 PROCEDURE — 97110 THERAPEUTIC EXERCISES: CPT | Mod: PO

## 2018-05-17 PROCEDURE — G8978 MOBILITY CURRENT STATUS: HCPCS | Mod: CK,PO

## 2018-05-17 PROCEDURE — G8979 MOBILITY GOAL STATUS: HCPCS | Mod: CK,PO

## 2018-05-17 NOTE — PROGRESS NOTES
Physical Therapy Daily Note   Name: Charmaine Whalen  Clinic Number: [id number]    Date of start of care: 3/1/2018  Date of treatment: 05/17/2018   Time in: 0900  Time out: 1000  Billable time: 30 min  # Visits: 12/20  Auth: (20) 12/31/2018  POC expiration: 6/5/2018    Diagnosis:   Encounter Diagnoses   Name Primary?    Chronic bilateral low back pain with bilateral sciatica Yes    Chronic midline low back pain, with sciatica presence unspecified      Physician: Damien Hernandez *  Treatment Orders: PT Eval and Treat    Subjective   Pt reports: Has been having increasing levels of fatigue lately and has been in consistently high pain. Is to have bloodwork done with MD next week. Requests today to be discharge day.    Pain Scale: 1/10 on arrival, 10/10 with lying down with first exercise. 8/10 pain level at end of treatment.      Objective     TREATMENT: therapeutic exercise x 45 min    Glute sets, 2x10 sitting   Neural flossing sciatic nerve in sitting with lower leg resting on stool, heel floating. 2x10 BLE   Seated HSS, 3x20s   Standing hip abduction 2x10 BLE (request early stop due to pain after completing 13 on her LLE)  Standing hamstring curls 2x10 BLE   Mini-squats at mat 2x10   Precor HSC, 20#, 20x   Precor LE Ext, 20#, 20x     Written Home Exercises Provided: Patient educated to continue with previously issued HEP. Includes pelvic tilts, supine hip abduction with yellow band and bridging.  Exercises were reviewed and Charmaine was able to demonstrate them prior to the end of the session. Pt received a written copy of exercises to perform at home. Charmaine demonstrated good  understanding of the education provided.     Assessment   Pt continues to present with pain and fatigue with completion of exercise, and her activity tolerance was mildly decreased today due to pain. Pt has made no significant changes in condition since her last plan of care and has  progressed poorly. Pt was given an updated HEP including all above exercises to complete independently as tolerated before possible discharge on 5/31/2018.     Charmaine is progressing poorly towards her goals and no updates to goals at this time. Will benefit from con't skilled care.    Anticipated barriers to physical therapy: none  Pt's spiritual, cultural and educational needs considered and pt agreeable to plan of care and goals.    Plan   Continue with established Plan of Care towards Physical Therapy goals.       Melia Mendez, PT

## 2018-05-21 ENCOUNTER — OFFICE VISIT (OUTPATIENT)
Dept: PSYCHIATRY | Facility: CLINIC | Age: 68
End: 2018-05-21
Payer: MEDICARE

## 2018-05-21 DIAGNOSIS — F33.1 MDD (MAJOR DEPRESSIVE DISORDER), RECURRENT EPISODE, MODERATE: ICD-10-CM

## 2018-05-21 DIAGNOSIS — F41.1 GAD (GENERALIZED ANXIETY DISORDER): Primary | ICD-10-CM

## 2018-05-21 PROCEDURE — 90834 PSYTX W PT 45 MINUTES: CPT | Mod: PBBFAC,PO | Performed by: SOCIAL WORKER

## 2018-05-21 PROCEDURE — 90834 PSYTX W PT 45 MINUTES: CPT | Mod: S$PBB,,, | Performed by: SOCIAL WORKER
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
## 2018-05-23 ENCOUNTER — TELEPHONE (OUTPATIENT)
Dept: RESEARCH | Facility: OTHER | Age: 68
End: 2018-05-23

## 2018-05-23 NOTE — PROGRESS NOTES
Date: 5/24/2018    Met with patient. Patient is having right leg pain, so in the best interest of the patient, will contact [employer] to reprogram and have her pain stable before enrolling in duty cycle. Gave patient an informed consent form (ICF) to take home and read. Will follow up after pain is stable. [employer] rep here. DESHAWN Morley present.       Study: Prospective Clinical Trial of Duty Cycled Stimulation Using the [employer] Spinal Cord Stimulation (SCS) System  IRB/Protocol #: [email or web address].A/[id number] DC  : Adan Samuels MD  Site Number: 26         Subject Number: -4_ _

## 2018-05-24 ENCOUNTER — RESEARCH ENCOUNTER (OUTPATIENT)
Dept: RESEARCH | Facility: OTHER | Age: 68
End: 2018-05-24

## 2018-05-24 ENCOUNTER — OFFICE VISIT (OUTPATIENT)
Dept: PAIN MEDICINE | Facility: CLINIC | Age: 68
End: 2018-05-24
Payer: MEDICARE

## 2018-05-24 VITALS
TEMPERATURE: 99 F | DIASTOLIC BLOOD PRESSURE: 65 MMHG | RESPIRATION RATE: 18 BRPM | HEART RATE: 60 BPM | SYSTOLIC BLOOD PRESSURE: 131 MMHG | BODY MASS INDEX: 31.77 KG/M2 | WEIGHT: 202.81 LBS | OXYGEN SATURATION: 99 %

## 2018-05-24 DIAGNOSIS — M54.16 LUMBAR RADICULITIS: ICD-10-CM

## 2018-05-24 DIAGNOSIS — G89.4 CHRONIC PAIN DISORDER: Primary | ICD-10-CM

## 2018-05-24 PROCEDURE — 99213 OFFICE O/P EST LOW 20 MIN: CPT | Mod: S$PBB,,, | Performed by: ANESTHESIOLOGY

## 2018-05-24 PROCEDURE — 99999 PR PBB SHADOW E&M-EST. PATIENT-LVL IV: CPT | Mod: PBBFAC,,, | Performed by: ANESTHESIOLOGY

## 2018-05-24 PROCEDURE — 99214 OFFICE O/P EST MOD 30 MIN: CPT | Mod: PBBFAC,PN | Performed by: ANESTHESIOLOGY
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
## 2018-05-30 ENCOUNTER — LAB VISIT (OUTPATIENT)
Dept: LAB | Facility: HOSPITAL | Age: 68
End: 2018-05-30
Attending: INTERNAL MEDICINE
Payer: MEDICARE

## 2018-05-30 DIAGNOSIS — D46.9 MDS (MYELODYSPLASTIC SYNDROME): ICD-10-CM

## 2018-05-30 LAB
ALBUMIN SERPL BCP-MCNC: 3.7 G/DL
ALP SERPL-CCNC: 72 U/L
ALT SERPL W/O P-5'-P-CCNC: 36 U/L
ANION GAP SERPL CALC-SCNC: 7 MMOL/L
AST SERPL-CCNC: 25 U/L
BASOPHILS # BLD AUTO: 0.02 K/UL
BASOPHILS NFR BLD: 0.6 %
BILIRUB SERPL-MCNC: 0.3 MG/DL
BUN SERPL-MCNC: 13 MG/DL
CALCIUM SERPL-MCNC: 8.9 MG/DL
CHLORIDE SERPL-SCNC: 105 MMOL/L
CO2 SERPL-SCNC: 30 MMOL/L
CREAT SERPL-MCNC: 0.74 MG/DL
DIFFERENTIAL METHOD: ABNORMAL
EOSINOPHIL # BLD AUTO: 0 K/UL
EOSINOPHIL NFR BLD: 1.2 %
ERYTHROCYTE [DISTWIDTH] IN BLOOD BY AUTOMATED COUNT: 13.7 %
EST. GFR  (AFRICAN AMERICAN): >60 ML/MIN/1.73 M^2
EST. GFR  (NON AFRICAN AMERICAN): >60 ML/MIN/1.73 M^2
GLUCOSE SERPL-MCNC: 105 MG/DL
HCT VFR BLD AUTO: 29.8 %
HGB BLD-MCNC: 9.8 G/DL
LDH SERPL L TO P-CCNC: 466 U/L
LYMPHOCYTES # BLD AUTO: 1.2 K/UL
LYMPHOCYTES NFR BLD: 35.2 %
MCH RBC QN AUTO: 37.1 PG
MCHC RBC AUTO-ENTMCNC: 32.9 G/DL
MCV RBC AUTO: 113 FL
MONOCYTES # BLD AUTO: 0.3 K/UL
MONOCYTES NFR BLD: 8.3 %
NEUTROPHILS # BLD AUTO: 1.8 K/UL
NEUTROPHILS NFR BLD: 54.7 %
NRBC BLD-RTO: 0 /100 WBC
PLATELET # BLD AUTO: 114 K/UL
PMV BLD AUTO: 9.6 FL
POTASSIUM SERPL-SCNC: 4 MMOL/L
PROT SERPL-MCNC: 6.2 G/DL
RBC # BLD AUTO: 2.64 M/UL
SODIUM SERPL-SCNC: 142 MMOL/L
WBC # BLD AUTO: 3.27 K/UL

## 2018-05-30 PROCEDURE — 83615 LACTATE (LD) (LDH) ENZYME: CPT

## 2018-05-30 PROCEDURE — 85025 COMPLETE CBC W/AUTO DIFF WBC: CPT

## 2018-05-30 PROCEDURE — 80053 COMPREHEN METABOLIC PANEL: CPT | Mod: PN

## 2018-05-30 PROCEDURE — 80053 COMPREHEN METABOLIC PANEL: CPT

## 2018-05-30 PROCEDURE — 85025 COMPLETE CBC W/AUTO DIFF WBC: CPT | Mod: PN

## 2018-05-30 PROCEDURE — 83615 LACTATE (LD) (LDH) ENZYME: CPT | Mod: PN

## 2018-05-30 PROCEDURE — 36415 COLL VENOUS BLD VENIPUNCTURE: CPT | Mod: PN

## 2018-06-04 ENCOUNTER — OFFICE VISIT (OUTPATIENT)
Dept: PSYCHIATRY | Facility: CLINIC | Age: 68
End: 2018-06-04
Payer: MEDICARE

## 2018-06-04 ENCOUNTER — OFFICE VISIT (OUTPATIENT)
Dept: FAMILY MEDICINE | Facility: CLINIC | Age: 68
End: 2018-06-04
Payer: MEDICARE

## 2018-06-04 VITALS
WEIGHT: 205.13 LBS | HEART RATE: 63 BPM | OXYGEN SATURATION: 98 % | DIASTOLIC BLOOD PRESSURE: 80 MMHG | BODY MASS INDEX: 32.2 KG/M2 | SYSTOLIC BLOOD PRESSURE: 122 MMHG | HEIGHT: 67 IN | TEMPERATURE: 99 F

## 2018-06-04 VITALS
HEART RATE: 66 BPM | WEIGHT: 203.69 LBS | BODY MASS INDEX: 31.9 KG/M2 | DIASTOLIC BLOOD PRESSURE: 67 MMHG | SYSTOLIC BLOOD PRESSURE: 129 MMHG

## 2018-06-04 DIAGNOSIS — M54.2 CERVICALGIA: Primary | ICD-10-CM

## 2018-06-04 DIAGNOSIS — G89.4 CHRONIC PAIN SYNDROME: ICD-10-CM

## 2018-06-04 DIAGNOSIS — G47.09 OTHER INSOMNIA: ICD-10-CM

## 2018-06-04 DIAGNOSIS — F41.1 GAD (GENERALIZED ANXIETY DISORDER): ICD-10-CM

## 2018-06-04 DIAGNOSIS — F32.A DEPRESSION, UNSPECIFIED DEPRESSION TYPE: ICD-10-CM

## 2018-06-04 DIAGNOSIS — F33.0 MAJOR DEPRESSIVE DISORDER, RECURRENT EPISODE, MILD: ICD-10-CM

## 2018-06-04 DIAGNOSIS — F33.1 MDD (MAJOR DEPRESSIVE DISORDER), RECURRENT EPISODE, MODERATE: Primary | ICD-10-CM

## 2018-06-04 DIAGNOSIS — R51.9 INTRACTABLE HEADACHE, UNSPECIFIED CHRONICITY PATTERN, UNSPECIFIED HEADACHE TYPE: ICD-10-CM

## 2018-06-04 DIAGNOSIS — F33.0 MDD (MAJOR DEPRESSIVE DISORDER), RECURRENT EPISODE, MILD: ICD-10-CM

## 2018-06-04 DIAGNOSIS — F41.1 GENERALIZED ANXIETY DISORDER: ICD-10-CM

## 2018-06-04 DIAGNOSIS — M54.41 BILATERAL LOW BACK PAIN WITH RIGHT-SIDED SCIATICA, UNSPECIFIED CHRONICITY: ICD-10-CM

## 2018-06-04 PROCEDURE — 99214 OFFICE O/P EST MOD 30 MIN: CPT | Mod: S$PBB,,, | Performed by: FAMILY MEDICINE

## 2018-06-04 PROCEDURE — 99213 OFFICE O/P EST LOW 20 MIN: CPT | Mod: PBBFAC | Performed by: PSYCHIATRY & NEUROLOGY

## 2018-06-04 PROCEDURE — 99999 PR PBB SHADOW E&M-EST. PATIENT-LVL III: CPT | Mod: PBBFAC,,, | Performed by: FAMILY MEDICINE

## 2018-06-04 PROCEDURE — 99213 OFFICE O/P EST LOW 20 MIN: CPT | Mod: PBBFAC,27,PN | Performed by: FAMILY MEDICINE

## 2018-06-04 PROCEDURE — 99214 OFFICE O/P EST MOD 30 MIN: CPT | Mod: S$PBB,,, | Performed by: PSYCHIATRY & NEUROLOGY

## 2018-06-04 PROCEDURE — 99999 PR PBB SHADOW E&M-EST. PATIENT-LVL III: CPT | Mod: PBBFAC,,, | Performed by: PSYCHIATRY & NEUROLOGY

## 2018-06-04 RX ORDER — FLUOXETINE HYDROCHLORIDE 20 MG/1
20 CAPSULE ORAL DAILY
Qty: 30 CAPSULE | Refills: 1 | Status: SHIPPED | OUTPATIENT
Start: 2018-06-04 | End: 2018-07-30
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
## 2018-06-04 NOTE — PATIENT INSTRUCTIONS
Take Savella 12.5 mg one tablet daily with a meal for one week, then increase dose to one tablet twice daily with meals.  After 1 week (week 3), can try to increase dose to 12.5 mg one in AM and 25 mg one in PM with meals.    After 1 more week (week 4), can try to increase dose to 25 mg twice daily with meals.  At this point, cut dose of Prozac to 20 mg daily.    After 2 more weeks, (week 6), increase Savella to 25 mg in AM and 50 mg in PM with meals.   After 2 more weeks (week 8), increase Savella to 50 mg twice daily with meals.  Stop Prozac.   Continue all other current medications with refills as noted.   Return to clinic in about 2 months for follow up appt.

## 2018-06-05 ENCOUNTER — OFFICE VISIT (OUTPATIENT)
Dept: PSYCHIATRY | Facility: CLINIC | Age: 68
End: 2018-06-05
Payer: MEDICARE

## 2018-06-05 DIAGNOSIS — G47.09 OTHER INSOMNIA: ICD-10-CM

## 2018-06-05 DIAGNOSIS — F41.1 GAD (GENERALIZED ANXIETY DISORDER): Primary | ICD-10-CM

## 2018-06-05 DIAGNOSIS — F33.0 MDD (MAJOR DEPRESSIVE DISORDER), RECURRENT EPISODE, MILD: ICD-10-CM

## 2018-06-05 PROCEDURE — 90834 PSYTX W PT 45 MINUTES: CPT | Mod: S$PBB,,, | Performed by: SOCIAL WORKER

## 2018-06-05 PROCEDURE — 90834 PSYTX W PT 45 MINUTES: CPT | Mod: PBBFAC,PO | Performed by: SOCIAL WORKER

## 2018-06-06 NOTE — PROGRESS NOTES
Individual Psychotherapy (PhD/LCSW)    6/5/2018    Site:  [facility], [place]         Therapeutic Intervention: Met with patient.  Outpatient - Insight oriented psychotherapy 45 min - CPT code 30633, Outpatient - Behavior modifying psychotherapy 45 min - CPT code 76396 and Outpatient - Supportive psychotherapy 45 min - CPT Code 32721    Chief complaint/reason for encounter: depression and anxiety     Interval history and content of current session:. Today pt presents as dysphoric, reports feeling mood is depressed, but a little better this afternoon as she was able to do some gardening and enjoyed that. Her pain becomes worse when she gardens, but it's worth it, as it is enjoyable and relaxing for her - per pt.  Pt reports increased irritability, especially with . Her daughter that lives on her property , with LEONEL and grandkids went to [place] to work on the living area and are supposed to be moving there soon. Pt and  going to [place]  Next week for granddaughter bday, hopes she will feel a little better when she sees where daughter is moving, the living conditions. Pt saw her AlAnon friends and enjoyed this, can talk to them about anything.  Pt denies any current SI/HI. Denies any current A/VH. Pt saw Dr. Souza  And meds were adjusted. Hopes increased trazodone will help her sleep more soundly.  Focused on deep breathing, focusing on the present and reminding self of her supports and strengths. Also discussed the couple dynamics / relationship, provided support, validation, reframing.       Treatment plan:  · Target symptoms: depression, anxiety   · Why chosen therapy is appropriate versus another modality: relevant to diagnosis, patient responds to this modality, evidence based practice  · Outcome monitoring methods: self-report, observation  · Therapeutic intervention type: insight oriented psychotherapy, behavior modifying psychotherapy, supportive psychotherapy    Risk parameters:  Patient reports no  suicidal ideation  Patient reports no homicidal ideation  Patient reports no self-injurious behavior  Patient reports no violent behavior    Verbal deficits: None    Patient's response to intervention:  The patient's response to intervention is accepting.    Progress toward goals and other mental status changes:  The patient's progress toward goals is fair .    Diagnosis:     ICD-10-CM ICD-9-CM   1. SERENE (generalized anxiety disorder) F41.1 300.02   2. MDD (major depressive disorder), recurrent episode, mild F33.0 296.31   3. Other insomnia G47.09 780.52     Treatment Goals:  Specify outcomes written in observable, behavioral terms:   Anxiety: reducing time spent worrying (<30 minutes/day)  Depression: reducing negative automatic thoughts     Treatment Plan/Recommendations:   · The treatment plan and follow up plan were reviewed with the patient.  Plan:  individual psychotherapy and consult psychiatrist for medication evaluation    Return to clinic: 2 weeks    Length of Service (minutes): 45

## 2018-06-15 NOTE — PROGRESS NOTES
Name: Charmaine Whalen  Referring Provider: Damien Hernandez *  PT Order: PT evaluate and treat  Clinical #: [id number]  Discharge Summary Date: 6/15/2018  Diagnosis:   Encounter Diagnoses   Name Primary?    Chronic bilateral low back pain with bilateral sciatica Yes    Chronic midline low back pain, with sciatica presence unspecified        Patient was seen for 14 OP PT visits from 3/1/2018 to 6/15/2018. Pt missed/no visit 1 scheduled sessions. At this time, pt requests discontinuation of PT services. Treatment included: evaluation, HEP, pt education, joint mobilizations, ther ex, and stretching. PT unable to fully assess goal achievement as pt did not return for follow up sessions/did not reschedule follow up visits. This patient is discharged from OP PT Services.    Melia Mendez DPT  6/15/2018

## 2018-06-19 ENCOUNTER — OFFICE VISIT (OUTPATIENT)
Dept: PSYCHIATRY | Facility: CLINIC | Age: 68
End: 2018-06-19
Payer: MEDICARE

## 2018-06-19 DIAGNOSIS — G89.4 CHRONIC PAIN SYNDROME: ICD-10-CM

## 2018-06-19 DIAGNOSIS — G47.09 OTHER INSOMNIA: ICD-10-CM

## 2018-06-19 DIAGNOSIS — F33.0 MDD (MAJOR DEPRESSIVE DISORDER), RECURRENT EPISODE, MILD: ICD-10-CM

## 2018-06-19 DIAGNOSIS — F41.1 GAD (GENERALIZED ANXIETY DISORDER): Primary | ICD-10-CM

## 2018-06-19 PROCEDURE — 90834 PSYTX W PT 45 MINUTES: CPT | Mod: PBBFAC,PO | Performed by: SOCIAL WORKER

## 2018-06-19 PROCEDURE — 90834 PSYTX W PT 45 MINUTES: CPT | Mod: S$PBB,,, | Performed by: SOCIAL WORKER

## 2018-06-19 NOTE — PROGRESS NOTES
Individual Psychotherapy (PhD/LCSW)    6/19/2018    Site:  [facility], [place], operated by [facility]         Therapeutic Intervention: Met with patient.  Outpatient - Insight oriented psychotherapy 45 min - CPT code 19421, Outpatient - Behavior modifying psychotherapy 45 min - CPT code 04512 and Outpatient - Supportive psychotherapy 45 min - CPT Code 66312    Chief complaint/reason for encounter: depression and anxiety     Interval history and content of current session:. Today pt presents as flat, reports mood is not as depressed, states the medication seems to be helping, less sadness, but some flatness. Pt also had Stimulator adjusted and pain level has decreased.  Pt liking gardening, also playing cards with friends, but not feeling a lot of yumiko. Trying to accept that she can not change the situation with her Son in law. Pt didn't go to [place] bc  didn't want to leave cat alone. Pt was upset but they are talking again. Pt denies any current SI/HI. Denies any current A/VH. Pt saw Dr. Souza  And meds were adjusted. Pt sleeping more soundly. A little restlessness noted when meds increased recently.  Focused on focusing on the present , use of healthy coping skills and neutral or positive distractions.  Also discussed the couple dynamics / relationship, provided support, validation, reframing.       Treatment plan:  · Target symptoms: depression, anxiety   · Why chosen therapy is appropriate versus another modality: relevant to diagnosis, patient responds to this modality, evidence based practice  · Outcome monitoring methods: self-report, observation  · Therapeutic intervention type: insight oriented psychotherapy, behavior modifying psychotherapy, supportive psychotherapy    Risk parameters:  Patient reports no suicidal ideation  Patient reports no homicidal ideation  Patient reports no self-injurious behavior  Patient reports no violent behavior    Verbal deficits: None    Patient's response to intervention:  The patient's response  to intervention is accepting.    Progress toward goals and other mental status changes:  The patient's progress toward goals is fair .    Diagnosis:     ICD-10-CM ICD-9-CM   1. SERENE (generalized anxiety disorder) F41.1 300.02   2. MDD (major depressive disorder), recurrent episode, mild F33.0 296.31   3. Other insomnia G47.09 780.52   4. Chronic pain syndrome G89.4 338.4     Treatment Goals:  Specify outcomes written in observable, behavioral terms:   Anxiety: reducing time spent worrying (<30 minutes/day)  Depression: reducing negative automatic thoughts     Treatment Plan/Recommendations:   · The treatment plan and follow up plan were reviewed with the patient.  Plan:  individual psychotherapy and consult psychiatrist for medication evaluation    Return to clinic: 2 weeks    Length of Service (minutes): 45

## 2018-06-23 DIAGNOSIS — K21.9 GASTROESOPHAGEAL REFLUX DISEASE, ESOPHAGITIS PRESENCE NOT SPECIFIED: ICD-10-CM

## 2018-06-24 DIAGNOSIS — F33.0 MDD (MAJOR DEPRESSIVE DISORDER), RECURRENT EPISODE, MILD: ICD-10-CM

## 2018-06-24 DIAGNOSIS — F41.1 GAD (GENERALIZED ANXIETY DISORDER): ICD-10-CM

## 2018-06-24 RX ORDER — PANTOPRAZOLE SODIUM 40 MG/1
TABLET, DELAYED RELEASE ORAL
Qty: 90 TABLET | Refills: 3 | Status: SHIPPED | OUTPATIENT
Start: 2018-06-24 | End: 2019-05-27 | Stop reason: ALTCHOICE

## 2018-07-01 RX ORDER — FLUOXETINE HYDROCHLORIDE 20 MG/1
CAPSULE ORAL
Qty: 90 CAPSULE | Refills: 1 | OUTPATIENT
Start: 2018-07-01

## 2018-07-12 ENCOUNTER — OFFICE VISIT (OUTPATIENT)
Dept: PSYCHIATRY | Facility: CLINIC | Age: 68
End: 2018-07-12
Payer: MEDICARE

## 2018-07-12 DIAGNOSIS — G89.4 CHRONIC PAIN SYNDROME: ICD-10-CM

## 2018-07-12 DIAGNOSIS — F41.1 GAD (GENERALIZED ANXIETY DISORDER): Primary | ICD-10-CM

## 2018-07-12 DIAGNOSIS — G47.09 OTHER INSOMNIA: ICD-10-CM

## 2018-07-12 DIAGNOSIS — F33.0 MDD (MAJOR DEPRESSIVE DISORDER), RECURRENT EPISODE, MILD: ICD-10-CM

## 2018-07-12 PROCEDURE — 90834 PSYTX W PT 45 MINUTES: CPT | Mod: PBBFAC,PO | Performed by: SOCIAL WORKER

## 2018-07-12 PROCEDURE — 90834 PSYTX W PT 45 MINUTES: CPT | Mod: S$PBB,,, | Performed by: SOCIAL WORKER

## 2018-07-12 NOTE — PROGRESS NOTES
"Individual Psychotherapy (PhD/LCSW)    7/12/2018    Site:  [facility]         Therapeutic Intervention: Met with patient.  Outpatient - Insight oriented psychotherapy 45 min - CPT code 40084, Outpatient - Behavior modifying psychotherapy 45 min - CPT code 81409 and Outpatient - Supportive psychotherapy 45 min - CPT Code 47067    Chief complaint/reason for encounter: depression and anxiety     Interval history and content of current session:. Today pt presents as dysphoric, tearful, reports things are "terrible", saw grandkids and daughter and they packed up, moving to [place] and the kids said they didn't want to go. 7 yo said they have to go to the bathroom in a bucket, use river water as well. Discussed safety and pt concerns and pt daughter Mikey is a NP , so is a mandated , is checking on the children. Pt called her in session, and Mikey is an NP and has visited the living conditions. She states they have a water filtration system and a composting toilet, which Mikey had used and she stated the living conditions are adequate and she will be checking on the kids next week. Her main concern is the bug bites and the kids getting bitten by ticks. After phone call, talked to pt about contacting child protection if she and daughter have concerns and she can call to tell CPS her concerns and they can decide to investigate. Pt reports  has been negative, critical, tells her she is doing everything wrong. Focused on support, validation, healthy coping skills, encouraged her to spend time with supportive friends. Pt denies any current SI/HI. Denies any current A/VH.       Treatment plan:  · Target symptoms: depression, anxiety   · Why chosen therapy is appropriate versus another modality: relevant to diagnosis, patient responds to this modality, evidence based practice  · Outcome monitoring methods: self-report, observation  · Therapeutic intervention type: insight oriented psychotherapy, behavior " modifying psychotherapy, supportive psychotherapy    Risk parameters:  Patient reports no suicidal ideation  Patient reports no homicidal ideation  Patient reports no self-injurious behavior  Patient reports no violent behavior    Verbal deficits: None    Patient's response to intervention:  The patient's response to intervention is accepting.    Progress toward goals and other mental status changes:  The patient's progress toward goals is limited.    Diagnosis:     ICD-10-CM ICD-9-CM   1. SERENE (generalized anxiety disorder) F41.1 300.02   2. MDD (major depressive disorder), recurrent episode, mild F33.0 296.31   3. Other insomnia G47.09 780.52   4. Chronic pain syndrome G89.4 338.4     Treatment Goals:  Specify outcomes written in observable, behavioral terms:   Anxiety: reducing time spent worrying (<30 minutes/day)  Depression: reducing negative automatic thoughts     Treatment Plan/Recommendations:   · The treatment plan and follow up plan were reviewed with the patient.  Plan:  individual psychotherapy and consult psychiatrist for medication evaluation    Return to clinic: 2 weeks    Length of Service (minutes): 45

## 2018-07-17 DIAGNOSIS — E03.9 HYPOTHYROIDISM, UNSPECIFIED TYPE: Primary | ICD-10-CM

## 2018-07-18 RX ORDER — LEVOTHYROXINE SODIUM 100 UG/1
TABLET ORAL
Qty: 90 TABLET | Refills: 0 | Status: SHIPPED | OUTPATIENT
Start: 2018-07-18 | End: 2018-10-14 | Stop reason: SDUPTHER

## 2018-07-18 NOTE — PROGRESS NOTES
Refill Authorization Note     is requesting a refill authorization.    Brief assessment and rationale for refill: APPROVE; Needs Labs  Amount/Quantity of medication ordered: 90  Date of last appointment: 6/4/2018     Refills Authorized: Yes  If authorized number of refills: 0        Medication-related problems identified:   Requires labs  Therapeutic duplication  Medication Therapy Plan: dz not commented on recently; Needs Labs (TSH) & NTBS; nov-7/30/18; Approve 3 more months; pt should be on fluoxetine 20 mg per pt instructions from Dr. Souza  Name and strength of medication: levothyroxine (SYNTHROID) 100 MCG tablet  How patient will take medication: t1t po qd  Medication reconciliation completed: No  Comments:   Lab Results   Component Value Date    TSH 3.050 02/17/2017    FREET4 1.16 01/08/2015

## 2018-07-18 NOTE — PROGRESS NOTES
Refill Authorization Note     is requesting a refill authorization.    Brief assessment and rationale for refill: APPROVE; Needs Labs  Amount/Quantity of medication ordered: 90  Date of last appointment: 6/4/2018     Refills Authorized: Yes  If authorized number of refills: 0           Medication Therapy Plan: dz not commented on recently; Needs Labs (TSH) & NTBS; nov-7/30/18; Approve 3 more months  Name and strength of medication: levothyroxine (SYNTHROID) 100 MCG tablet  How patient will take medication: t1t po qd  Medication reconciliation completed: No  Comments:     Lab Results   Component Value Date    TSH 3.050 02/17/2017    FREET4 1.16 01/08/2015

## 2018-07-26 ENCOUNTER — OFFICE VISIT (OUTPATIENT)
Dept: PSYCHIATRY | Facility: CLINIC | Age: 68
End: 2018-07-26
Payer: MEDICARE

## 2018-07-26 DIAGNOSIS — F33.0 MDD (MAJOR DEPRESSIVE DISORDER), RECURRENT EPISODE, MILD: ICD-10-CM

## 2018-07-26 DIAGNOSIS — G47.09 OTHER INSOMNIA: ICD-10-CM

## 2018-07-26 DIAGNOSIS — F41.1 GAD (GENERALIZED ANXIETY DISORDER): Primary | ICD-10-CM

## 2018-07-26 DIAGNOSIS — G89.4 CHRONIC PAIN SYNDROME: ICD-10-CM

## 2018-07-26 PROCEDURE — 90834 PSYTX W PT 45 MINUTES: CPT | Mod: S$PBB,,, | Performed by: SOCIAL WORKER

## 2018-07-26 PROCEDURE — 90834 PSYTX W PT 45 MINUTES: CPT | Mod: PBBFAC,PO | Performed by: SOCIAL WORKER

## 2018-07-30 ENCOUNTER — LAB VISIT (OUTPATIENT)
Dept: LAB | Facility: HOSPITAL | Age: 68
End: 2018-07-30
Attending: FAMILY MEDICINE
Payer: MEDICARE

## 2018-07-30 ENCOUNTER — OFFICE VISIT (OUTPATIENT)
Dept: FAMILY MEDICINE | Facility: CLINIC | Age: 68
End: 2018-07-30
Payer: MEDICARE

## 2018-07-30 VITALS
TEMPERATURE: 99 F | DIASTOLIC BLOOD PRESSURE: 70 MMHG | WEIGHT: 207.81 LBS | BODY MASS INDEX: 32.62 KG/M2 | HEIGHT: 67 IN | OXYGEN SATURATION: 97 % | SYSTOLIC BLOOD PRESSURE: 102 MMHG | HEART RATE: 87 BPM

## 2018-07-30 DIAGNOSIS — M54.2 CERVICALGIA: Primary | ICD-10-CM

## 2018-07-30 DIAGNOSIS — E03.9 HYPOTHYROIDISM, UNSPECIFIED TYPE: ICD-10-CM

## 2018-07-30 DIAGNOSIS — F32.A DEPRESSION, UNSPECIFIED DEPRESSION TYPE: ICD-10-CM

## 2018-07-30 DIAGNOSIS — M54.41 BILATERAL LOW BACK PAIN WITH RIGHT-SIDED SCIATICA, UNSPECIFIED CHRONICITY: ICD-10-CM

## 2018-07-30 LAB — TSH SERPL DL<=0.005 MIU/L-ACNC: 2.55 UIU/ML

## 2018-07-30 PROCEDURE — 36415 COLL VENOUS BLD VENIPUNCTURE: CPT | Mod: PN

## 2018-07-30 PROCEDURE — 84443 ASSAY THYROID STIM HORMONE: CPT

## 2018-07-30 PROCEDURE — 99213 OFFICE O/P EST LOW 20 MIN: CPT | Mod: PBBFAC,PN | Performed by: FAMILY MEDICINE

## 2018-07-30 PROCEDURE — 99213 OFFICE O/P EST LOW 20 MIN: CPT | Mod: S$PBB,,, | Performed by: FAMILY MEDICINE

## 2018-07-30 PROCEDURE — 99999 PR PBB SHADOW E&M-EST. PATIENT-LVL III: CPT | Mod: PBBFAC,,, | Performed by: FAMILY MEDICINE

## 2018-07-30 RX ORDER — TRAZODONE HYDROCHLORIDE 100 MG/1
TABLET ORAL
COMMUNITY
End: 2018-07-30 | Stop reason: SDUPTHER

## 2018-07-30 NOTE — PROGRESS NOTES
" THIS DOCUMENT WAS MADE IN PART WITH VOICE RECOGNITION SOFTWARE.  OCCASIONALLY THIS SOFTWARE WILL MISINTERPRET WORDS OR PHRASES.      Charmaine Whalen  1950    Subjective     Chief Complaint   Patient presents with    Follow-up     4 weeks       HPI    Diagnoses and all orders for this visit:    Cervicalgia  Bilateral low back pain with right-sided sciatica, unspecified chronicity  Doing better  Still neck stiff, mostly movement now  Stimulator now working better after a parent setting adjustments.    Depression, unspecified depression type   she continues to consult with her psychiatrist.  On Savella,  She reports this has greatly helped her with her "anxiety" but I suspect this has also significantly helped with her pain      Active Ambulatory Problems     Diagnosis Date Noted    Generalized anxiety disorder 07/24/2012    Lattice degeneration of peripheral retina, bilateral 12/12/2012    Posterior vitreous detachment 12/12/2012    Nuclear sclerosis 12/12/2012    Vitreous hemorrhage 12/12/2012    Retinal tear 12/12/2012    AF (atrial fibrillation) 12/28/2012    MDS (myelodysplastic syndrome) 01/28/2013    Hypothyroidism 01/28/2013    History of colon cancer 07/03/2013    RAMIREZ (dyspnea on exertion) 07/03/2013    Major depressive disorder, recurrent episode, mild 09/21/2013    Pancytopenia 11/18/2013    DDD (degenerative disc disease), lumbar 03/14/2014    Degeneration of lumbar or lumbosacral intervertebral disc 05/07/2014    Retinal break of left eye 10/29/2014    Colon cancer screening 06/19/2015    Spondylosis of lumbar region without myelopathy or radiculopathy 10/06/2015    Lumbar stenosis 10/06/2015    GERD (gastroesophageal reflux disease) 12/01/2015    Lumbar radiculopathy 01/13/2016    Chronic back pain 03/08/2016    Chronic pain syndrome 03/08/2016    Encounter for postoperative wound check 04/19/2016    Need for rabies vaccination 10/10/2016    Chest pain with moderate risk " for cardiac etiology 02/16/2017    CORWIN (obstructive sleep apnea) 02/16/2017    Mitral regurgitation 02/16/2017    Chest pain 02/16/2017    Abnormal thallium stress test     Atherosclerotic heart disease of native coronary artery with angina pectoris     Constipation 02/17/2017    Facet hypertrophy of lumbar region 07/14/2017    Chronic pain associated with significant psychosocial dysfunction 09/18/2017    Chronic bilateral low back pain with bilateral sciatica 03/01/2018     Resolved Ambulatory Problems     Diagnosis Date Noted    No Resolved Ambulatory Problems     Past Medical History:   Diagnosis Date    *Atrial fibrillation     Allergy     Anemia     Anticoagulant long-term use     Anxiety     Atherosclerotic heart disease of native coronary artery with angina pectoris     Cataract     Chronic pain     Colon cancer     Colon polyp     DDD (degenerative disc disease), lumbar     Depression     Encounter for blood transfusion     Esophageal stenosis     Fibromyalgia     Gastritis     GERD (gastroesophageal reflux disease)     Hiatal hernia     History of psychiatric hospitalization     History of renal calculi     Hypersomnia with sleep apnea     Hypothyroidism 1/28/2013    Irritable bowel syndrome     Meningitis     Myelodysplastic syndrome     CORWIN (obstructive sleep apnea)     Personal history of colonic polyps     PVD (posterior vitreous detachment)     Retinal detachment     Rheumatic fever     Supraventricular tachycardia     Thrombocytopenia          Review of Systems   As above. She still has depression but appears improving and denies suicidal thoughts.     She still has stiffness and pain in the neck and back but significantly improved    Objective     Physical Exam  Vitals:    07/30/18 1531   BP: 102/70   BP Location: Left arm   Patient Position: Sitting   BP Method: Large (Manual)   Pulse: 87   Temp: 99.1 °F (37.3 °C)   TempSrc: Oral   SpO2: 97%   Weight: 94.3  "kg (207 lb 12.5 oz)   Height: 5' 7" (1.702 m)      she still has a depressed appearance but appears significantly improved, more relaxed and more comfortable    MOST RECENT LABS IN OUR ELECTRONIC MEDICAL RECORD:         Charmaine was seen today for follow-up.    Diagnoses and all orders for this visit:    Cervicalgia    Bilateral low back pain with right-sided sciatica, unspecified chronicity    Depression, unspecified depression type       overall she looks much better today. She still has significant pain and depression but is in a much better state than last seen. She will continue to follow with her pain doctor and her psychiatrist. I'd like to see her back in three or four months. I'm very encouraged by her progress even though she still has need for further improvement    "

## 2018-08-04 DIAGNOSIS — F33.0 MDD (MAJOR DEPRESSIVE DISORDER), RECURRENT EPISODE, MILD: ICD-10-CM

## 2018-08-04 DIAGNOSIS — G89.4 CHRONIC PAIN SYNDROME: ICD-10-CM

## 2018-08-06 RX ORDER — MILNACIPRAN HYDROCHLORIDE 50 MG/1
TABLET, FILM COATED ORAL
Qty: 60 TABLET | Refills: 2 | Status: SHIPPED | OUTPATIENT
Start: 2018-08-06 | End: 2018-11-08

## 2018-08-07 ENCOUNTER — TELEPHONE (OUTPATIENT)
Dept: RESEARCH | Facility: OTHER | Age: 68
End: 2018-08-07

## 2018-08-07 NOTE — TELEPHONE ENCOUNTER
Contacted patient to follow-up regarding right leg pain since readjusting programming parameters of SCS. She reports 85-90% pain relief overall since reprogramming in May. Patient is interested in re-evaluating enrollment for the Duty Cycle IRB#2017.419 clinical trial at a follow-up visit with Dr. Samuels. Tentatively scheduled an appointment with Dr. Samuels for 8/21/18. Awaiting response from [facility] for his availability.

## 2018-08-09 ENCOUNTER — TELEPHONE (OUTPATIENT)
Dept: RESEARCH | Facility: OTHER | Age: 68
End: 2018-08-09

## 2018-08-24 ENCOUNTER — TELEPHONE (OUTPATIENT)
Dept: RESEARCH | Facility: OTHER | Age: 68
End: 2018-08-24

## 2018-08-27 ENCOUNTER — RESEARCH ENCOUNTER (OUTPATIENT)
Dept: RESEARCH | Facility: OTHER | Age: 68
End: 2018-08-27

## 2018-08-27 NOTE — PROGRESS NOTES
Date: 27 August 2018  Study: Prospective Clinical Trial of Duty Cycled Stimulation Using the Senza Spinal Cord Stimulation (SCS) System  IRB/Protocol #: 2017.419.A/[id number] DC  : Adan Samuels MD  Site Number: 26         Subject Number: -[id number]    Consent:    Subject attended the scheduled visit with her . She reports nearly complete pain relief after the previous parameter adjustments for her right leg pain three months ago. The informed consent form was discussed extensively with ample time provided for subjects questions and answers. An emphasis was placed on HIPPA practices, voluntary participation, and confidentiality language. The IRB was discussed with the subject and contact information was reviewed. Dr. Boss was available to discuss the study indications, procedure(s), inclusion and exclusion criteria, alternative treatments, anticipated risks, expected/anticipated benefits, and to answer any additional questions if necessary. The subject was provided with adequate time to independently read and review the informed consent form. Subject expressed a clear understanding of the risks, benefits, indications, and alternatives to the investigational study and the associated procedure(s). All elements of the informed consent form, study requirements and expectations were reviewed with the patient and all concerns were identified and addressed. The subject was provided with a signed copy of the form to take home and study staff contact information was noted. No study related procedures were performed prior to subjects signing of the consent.    Entry Criteria Evaluation:    Subject confirms the use of a stable analgesic regimen for at least 28 days and states willingness to remain on these medications with no dose adjustments until study completion or study withdrawal. Subject was evaluated for trial eligibility using inclusion/exclusion guidelines. Subject completed all  self-reported questions regarding her pain and was provided with a participant diary to complete prior to her next visit. She reports 90% back and pain relief over the past two weeks. Subject is greater than two years post-menopausal and previously underwent a hysterectomy. Device diagnostics completed by the [facility], Michael Chen, confirms subjects eligibility for enrollment. Subject was diagnosed with myelodysplastic syndrome in 2012, however, her condition has remained stable for the past 6 years. She is scheduled to return for a baseline visit on 09/04/18 with her completed one-week diary.

## 2018-09-04 ENCOUNTER — RESEARCH ENCOUNTER (OUTPATIENT)
Dept: RESEARCH | Facility: OTHER | Age: 68
End: 2018-09-04

## 2018-09-04 NOTE — PROGRESS NOTES
Date: 4 September 2018  Study: Prospective Clinical Trial of Duty Cycled Stimulation Using the Senza Spinal Cord Stimulation (SCS) System  IRB/Protocol #: 2017.419.A/[id number] DC  : Adan Samuels MD  Site Number: 26         Subject Number: -[id number]    End of Baseline/Run-In Screening:    Subject returns to the Clinical Trials Unit with her spouse and her completed participant diary from the past seven days. She reports no changes in her medications or any new adverse events since the last visit. The Subject Satisfaction questionnaire was completed by the patient while her diary entries were assessed by Michael Chen, the . Due to the variability in her self-reported pain scores, she was determined a screen failure from the calculations computed by Michael. Subject does state the instability in her pain may be attributable to her increase in exertion from packing. For this reason, no further testing was performed during the visit.

## 2018-09-10 DIAGNOSIS — F33.0 MDD (MAJOR DEPRESSIVE DISORDER), RECURRENT EPISODE, MILD: ICD-10-CM

## 2018-09-10 DIAGNOSIS — G89.4 CHRONIC PAIN SYNDROME: ICD-10-CM

## 2018-09-14 ENCOUNTER — PATIENT MESSAGE (OUTPATIENT)
Dept: PSYCHIATRY | Facility: CLINIC | Age: 68
End: 2018-09-14

## 2018-09-14 RX ORDER — MILNACIPRAN HYDROCHLORIDE 50 MG/1
TABLET, FILM COATED ORAL
Qty: 60 TABLET | Refills: 0 | OUTPATIENT
Start: 2018-09-14

## 2018-10-14 ENCOUNTER — PATIENT MESSAGE (OUTPATIENT)
Dept: GASTROENTEROLOGY | Facility: CLINIC | Age: 68
End: 2018-10-14

## 2018-10-14 ENCOUNTER — PATIENT MESSAGE (OUTPATIENT)
Dept: FAMILY MEDICINE | Facility: CLINIC | Age: 68
End: 2018-10-14

## 2018-10-14 ENCOUNTER — PATIENT MESSAGE (OUTPATIENT)
Dept: CARDIOLOGY | Facility: CLINIC | Age: 68
End: 2018-10-14

## 2018-10-14 DIAGNOSIS — E03.9 HYPOTHYROIDISM, UNSPECIFIED TYPE: ICD-10-CM

## 2018-10-14 DIAGNOSIS — Z12.31 ENCOUNTER FOR SCREENING MAMMOGRAM FOR BREAST CANCER: Primary | ICD-10-CM

## 2018-10-14 RX ORDER — LEVOTHYROXINE SODIUM 100 UG/1
TABLET ORAL
Qty: 90 TABLET | Refills: 1 | Status: SHIPPED | OUTPATIENT
Start: 2018-10-14 | End: 2019-04-09 | Stop reason: SDUPTHER

## 2018-10-15 ENCOUNTER — HOSPITAL ENCOUNTER (OUTPATIENT)
Dept: RADIOLOGY | Facility: HOSPITAL | Age: 68
Discharge: HOME | End: 2018-10-15
Attending: FAMILY MEDICINE
Payer: MEDICARE

## 2018-10-15 ENCOUNTER — PATIENT MESSAGE (OUTPATIENT)
Dept: GASTROENTEROLOGY | Facility: CLINIC | Age: 68
End: 2018-10-15

## 2018-10-15 ENCOUNTER — PATIENT MESSAGE (OUTPATIENT)
Dept: FAMILY MEDICINE | Facility: CLINIC | Age: 68
End: 2018-10-15

## 2018-10-15 ENCOUNTER — PATIENT MESSAGE (OUTPATIENT)
Dept: CARDIOLOGY | Facility: CLINIC | Age: 68
End: 2018-10-15

## 2018-10-15 DIAGNOSIS — Z12.31 ENCOUNTER FOR SCREENING MAMMOGRAM FOR BREAST CANCER: ICD-10-CM

## 2018-10-15 PROCEDURE — 77063 BREAST TOMOSYNTHESIS BI: CPT | Mod: TC,PO

## 2018-10-15 PROCEDURE — 77067 SCR MAMMO BI INCL CAD: CPT | Mod: 26,,, | Performed by: RADIOLOGY

## 2018-10-15 PROCEDURE — 77067 SCR MAMMO BI INCL CAD: CPT | Mod: TC,PO

## 2018-10-15 PROCEDURE — 77063 BREAST TOMOSYNTHESIS BI: CPT | Mod: 26,,, | Performed by: RADIOLOGY

## 2018-10-22 ENCOUNTER — ANESTHESIA EVENT (OUTPATIENT)
Dept: ENDOSCOPY | Facility: HOSPITAL | Age: 68
End: 2018-10-22
Payer: MEDICARE

## 2018-10-22 ENCOUNTER — HOSPITAL ENCOUNTER (OUTPATIENT)
Facility: HOSPITAL | Age: 68
Discharge: HOME | End: 2018-10-22
Attending: INTERNAL MEDICINE | Admitting: INTERNAL MEDICINE
Payer: MEDICARE

## 2018-10-22 ENCOUNTER — ANESTHESIA (OUTPATIENT)
Dept: ENDOSCOPY | Facility: HOSPITAL | Age: 68
End: 2018-10-22
Payer: MEDICARE

## 2018-10-22 VITALS
HEART RATE: 68 BPM | HEIGHT: 67 IN | DIASTOLIC BLOOD PRESSURE: 67 MMHG | SYSTOLIC BLOOD PRESSURE: 118 MMHG | TEMPERATURE: 98 F | WEIGHT: 203 LBS | RESPIRATION RATE: 16 BRPM | OXYGEN SATURATION: 97 % | BODY MASS INDEX: 31.86 KG/M2

## 2018-10-22 DIAGNOSIS — Z85.038 HX OF COLON CANCER, STAGE II: ICD-10-CM

## 2018-10-22 PROCEDURE — D9220A PRA ANESTHESIA: Mod: PT,ANES,, | Performed by: ANESTHESIOLOGY

## 2018-10-22 PROCEDURE — 88305 TISSUE EXAM BY PATHOLOGIST: CPT | Mod: 26,,, | Performed by: PATHOLOGY

## 2018-10-22 PROCEDURE — D9220A PRA ANESTHESIA: Mod: PT,CRNA,, | Performed by: NURSE ANESTHETIST, CERTIFIED REGISTERED

## 2018-10-22 PROCEDURE — 45385 COLONOSCOPY W/LESION REMOVAL: CPT | Mod: PO | Performed by: INTERNAL MEDICINE

## 2018-10-22 PROCEDURE — 88305 TISSUE EXAM BY PATHOLOGIST: CPT | Performed by: PATHOLOGY

## 2018-10-22 PROCEDURE — 37000008 HC ANESTHESIA 1ST 15 MINUTES: Mod: PO | Performed by: INTERNAL MEDICINE

## 2018-10-22 PROCEDURE — 45385 COLONOSCOPY W/LESION REMOVAL: CPT | Mod: PT,,, | Performed by: INTERNAL MEDICINE

## 2018-10-22 PROCEDURE — 63600175 PHARM REV CODE 636 W HCPCS: Mod: PO | Performed by: NURSE ANESTHETIST, CERTIFIED REGISTERED

## 2018-10-22 PROCEDURE — 25000003 PHARM REV CODE 250: Mod: PO | Performed by: INTERNAL MEDICINE

## 2018-10-22 PROCEDURE — 27201089 HC SNARE, DISP (ANY): Mod: PO | Performed by: INTERNAL MEDICINE

## 2018-10-22 PROCEDURE — 37000009 HC ANESTHESIA EA ADD 15 MINS: Mod: PO | Performed by: INTERNAL MEDICINE

## 2018-10-22 RX ORDER — SODIUM CHLORIDE 0.9 % (FLUSH) 0.9 %
3 SYRINGE (ML) INJECTION
Status: DISCONTINUED | OUTPATIENT
Start: 2018-10-22 | End: 2018-10-22

## 2018-10-22 RX ORDER — SODIUM CHLORIDE, SODIUM LACTATE, POTASSIUM CHLORIDE, CALCIUM CHLORIDE 600; 310; 30; 20 MG/100ML; MG/100ML; MG/100ML; MG/100ML
INJECTION, SOLUTION INTRAVENOUS CONTINUOUS
Status: DISCONTINUED | OUTPATIENT
Start: 2018-10-22 | End: 2018-10-22 | Stop reason: HOSPADM

## 2018-10-22 RX ORDER — PROPOFOL 10 MG/ML
VIAL (ML) INTRAVENOUS CONTINUOUS PRN
Status: DISCONTINUED | OUTPATIENT
Start: 2018-10-22 | End: 2018-10-22

## 2018-10-22 RX ORDER — PROPOFOL 10 MG/ML
VIAL (ML) INTRAVENOUS
Status: DISCONTINUED | OUTPATIENT
Start: 2018-10-22 | End: 2018-10-22

## 2018-10-22 RX ORDER — LIDOCAINE HCL/PF 100 MG/5ML
SYRINGE (ML) INTRAVENOUS
Status: DISCONTINUED | OUTPATIENT
Start: 2018-10-22 | End: 2018-10-22

## 2018-10-22 RX ADMIN — PROPOFOL 150 MCG/KG/MIN: 10 INJECTION, EMULSION INTRAVENOUS at 08:10

## 2018-10-22 RX ADMIN — PROPOFOL 25 MG: 10 INJECTION, EMULSION INTRAVENOUS at 08:10

## 2018-10-22 RX ADMIN — PROPOFOL 100 MG: 10 INJECTION, EMULSION INTRAVENOUS at 08:10

## 2018-10-22 RX ADMIN — LIDOCAINE HYDROCHLORIDE 100 MG: 20 INJECTION, SOLUTION INTRAVENOUS at 08:10

## 2018-10-22 RX ADMIN — SODIUM CHLORIDE, SODIUM LACTATE, POTASSIUM CHLORIDE, AND CALCIUM CHLORIDE: .6; .31; .03; .02 INJECTION, SOLUTION INTRAVENOUS at 08:10

## 2018-10-22 NOTE — TRANSFER OF CARE
"Anesthesia Transfer of Care Note    Patient: Charmaine Whalen    Procedure(s) Performed: Procedure(s) (LRB):  COLONOSCOPY (N/A)    Patient location: PACU    Anesthesia Type: general    Transport from OR: Transported from OR on room air with adequate spontaneous ventilation    Post pain: adequate analgesia    Post assessment: no apparent anesthetic complications    Post vital signs: stable    Level of consciousness: responds to stimulation    Nausea/Vomiting: no nausea/vomiting    Complications: none    Transfer of care protocol was followed      Last vitals:   Visit Vitals  BP (!) 140/65 (BP Location: Right arm, Patient Position: Lying)   Pulse 70   Temp 36.6 °C (97.9 °F) (Skin)   Resp 15   Ht 5' 7" (1.702 m)   Wt 92.1 kg (203 lb)   SpO2 100%   Breastfeeding? No   BMI 31.79 kg/m²     "

## 2018-10-22 NOTE — DISCHARGE INSTRUCTIONS
Recovery After Procedural Sedation (Adult)  You have been given medicine by vein to make you sleep during your surgery. This may have included both a pain medicine and sleeping medicine. Most of the effects have worn off. But you may still have some drowsiness for the next 6 to 8 hours.  Home care  Follow these guidelines when you get home:  · For the next 8 hours, you should be watched by a responsible adult. This person should make sure your condition is not getting worse.  · Don't drink any alcohol for the next 24 hours.  · Don't drive, operate dangerous machinery, or make important business or personal decisions during the next 24 hours.  Note: Your healthcare provider may tell you not to take any medicine by mouth for pain or sleep in the next 4 hours. These medicines may react with the medicines you were given in the hospital. This could cause a much stronger response than usual.  Follow-up care  Follow up with your healthcare provider if you are not alert and back to your usual level of activity within 12 hours.  When to seek medical advice  Call your healthcare provider right away if any of these occur:  · Drowsiness gets worse  · Weakness or dizziness gets worse  · Repeated vomiting  · You can't be awakened   Date Last Reviewed: 10/18/2016  © 7092-5285 [employer]. [address] [place] [address], [place] [address]. All rights reserved. This information is not intended as a substitute for professional medical care. Always follow your healthcare professional's instructions.      PROBIOTICS:  Now that your colon is so cleaned out, now is a good time for a round of PROBIOTICS.  Eat a container of Greek Yogurt, such as OIKOS or CHOBANI,  Or Activia or Dannon    Greek Yogurt.    Or Take a similar Probiotic product such as Align or Culturelle or Edna-Q, every day for a month.                  (The products listed are non-prescription, but you may need to ask the pharmacist for their location.)  Repeat  this at least 5-6 times a year.        High-Fiber Diet  Fiber is in fruits, vegetables, cereals, and grains. Fiber passes through your body undigested. A high-fiber diet helps food move through your intestinal tract. The added bulk is helpful in preventing constipation. In people with diverticulosis, fiber helps clean out the pouches along the colon wall. It also prevents new pouches from forming. A high-fiber diet reduces the risk of colon cancer. It also lowers blood cholesterol and prevents high blood sugar in people with diabetes.    The fiber-rich foods listed below should be part of your diet. If you are not used to high-fiber foods, start with 1 or 2 foods from this list. Every 3 to 4 days add a new one to your diet. Do this until you are eating 4 high-fiber foods per day. This should give you 20 to 35 grams of fiber a day. It is also important to drink a lot of water when you are on this diet. You should have 6 to 8 glasses of water a day. Water makes the fiber swell and increases the benefit.  Foods high in dietary fiber  The following foods are high in dietary fiber:  · Breads. Breads made with 100% whole-wheat flour; hayder, wheat, or rye crackers; whole-grain tortillas, bran muffins.  · Cereals. Whole-grain and bran cereals with bran (shredded wheat, wheat flakes, raisin bran, corn bran); oatmeal, rolled oats, granola, and brown rice.  · Fruits. Fresh fruits and their edible skins (pears, prunes, raisins, berries, apples, and apricots); bananas, citrus fruit, mangoes, pineapple; and prune juice.  · Nuts. Any nuts and seeds.  · Vegetables. Best served raw or lightly cooked. All types, especially: green peas, celery, eggplant, potatoes, spinach, broccoli, [place] sprouts, winter squash, carrots, cauliflower, soybeans, lentils, and fresh and dried beans of all kinds.  · Other. Popcorn, any spices.  Date Last Reviewed: 8/1/2016  © 1931-1823 [employer]. [address] [place] [address], [place]  [address]. All rights reserved. This information is not intended as a substitute for professional medical care. Always follow your healthcare professional's instructions.

## 2018-10-22 NOTE — BRIEF OP NOTE
Discharge Note  Short Stay      SUMMARY     Admit Date: 10/22/2018    Attending Physician: Cooper Rodriguez Jr., MD     Discharge Physician: Cooper Rodriguez Jr., MD    Discharge Date: 10/22/2018 9:44 AM    Final Diagnosis: Hx of colonic polyp [Z86.010]  Impression:          - One benign appearing 2 mm polyp in the rectum,                        removed with a cold snare. Resected and retrieved.                       - One 3 mm polyp in the proximal descending colon,                        removed with a cold snare. Resected and retrieved.                       - One 1 to 2 mm polyp in the distal ascending colon,                        removed with a cold snare. Resected and retrieved.                       - Diverticulosis in the sigmoid colon.                       - Patent functional end-to-end ileo-colonic                        anastomosis, characterized by healthy appearing                        mucosa.                       - EXTREMELY Redundant colon.                       - Non-bleeding internal hemorrhoids.                       - Non-thrombosed external hemorrhoids found on                        digital rectal exam.                       - The examined portion of the ileum was normal.  Recommendation:      - Discharge patient to home.                       - Await pathology results.                       - Repeat colonoscopy in 3 years for surveillance.                       - High fiber diet.                       - Use fiber, for example Citrucel, Fibercon, Konsyl                        or Metamucil.                       - Take a PROBIOTIC, such as a carton of GREEK YOGURT                        (Chobani or Oikos, or Activia or Dannon); or tablets                        of ALIGN or CULTURELLE or GLEN-Q (all                        non-prescription), every day for a month.                       - Miralax 1 capful (17 grams) in 8 ounces of water                        PO daily PRN.                        - Call the [facility] clinic in 2 weeks for reports (if                        you haven't heard from us sooner) [phone].                       - !!! USE ADULT COLONOSCOPE FOR FUTURE EXAMS. !!!                       .                       - Continue present medications.                       - Patient has a contact number available for                        emergencies. The signs and symptoms of potential                        delayed complications were discussed with the                        patient. Return to normal activities tomorrow.                        Written discharge instructions were provided to the                        patient.                       - Return to normal activities tomorrow.  Cooper Rodriguez MD  10/22/2018  Disposition: HOME OR SELF CARE    Patient Instructions:   Current Discharge Medication List      CONTINUE these medications which have NOT CHANGED    Details   aspirin (ECOTRIN) 81 MG EC tablet Take 1 tablet (81 mg total) by mouth once daily.  Refills: 0      b complex vitamins tablet Take 1 tablet by mouth every morning.       buPROPion (WELLBUTRIN XL) 300 MG 24 hr tablet Take 1 tablet (300 mg total) by mouth every morning.  Qty: 90 tablet, Refills: 3    Associated Diagnoses: MDD (major depressive disorder), recurrent episode, mild      cholecalciferol, vitamin D3, (VITAMIN D) 1,000 unit capsule Take 2,000 Units by mouth every morning.       docusate sodium (STOOL SOFTENER) 100 mg capsule Take 100 mg by mouth 2 (two) times daily.  Refills: 0      gabapentin (NEURONTIN) 300 MG capsule TAKE 2 CAPSULES BY MOUTH TWICE DAILY  Qty: 360 capsule, Refills: 2      levothyroxine (SYNTHROID) 100 MCG tablet TAKE 1 TABLET BY MOUTH EVERY DAY  Qty: 90 tablet, Refills: 1    Associated Diagnoses: Hypothyroidism, unspecified type      pantoprazole (PROTONIX) 40 MG tablet TAKE 1 TABLET BY MOUTH EVERY DAY  Qty: 90 tablet, Refills: 3    Associated Diagnoses: Gastroesophageal reflux disease,  esophagitis presence not specified      ranitidine (ZANTAC) 300 MG tablet TAKE 1 TABLET BY MOUTH EVERY EVENING 30 TO 60 MINUTES BEFORE BEDTIME  Qty: 90 tablet, Refills: 3      SAVELLA 50 mg Tab TAKE 1 TABLET BY MOUTH TWICE DAILY  Qty: 60 tablet, Refills: 2    Associated Diagnoses: MDD (major depressive disorder), recurrent episode, mild; Chronic pain syndrome      trazodone (DESYREL) 100 MG tablet TAKE 1 TO 2 TABLETS BY MOUTH EVERY NIGHT AT BEDTIME FOR SLEEP  Qty: 180 tablet, Refills: 3    Associated Diagnoses: SERENE (generalized anxiety disorder); Other insomnia      oxycodone-acetaminophen (PERCOCET)  mg per tablet Take 1 tablet by mouth every 6 (six) hours as needed for Pain.  Qty: 40 tablet, Refills: 0             Discharge Procedure Orders (must include Diet, Follow-up, Activity)    Follow Up:  Follow up with PCP as per your routine.  Please follow a high fiber diet.  Activity as tolerated.    No driving day of procedure.    PROBIOTICS:  Now that your colon is so cleaned out, now is a good time for a round of PROBIOTICS.  Eat a container of Greek Yogurt, such as OIKOS or CHOBANI,  Or Activia or Dannon    Greek Yogurt.    Or Take a similar Probiotic product such as Align or Culturelle or Edna-Q, every day for a month.                  (The products listed are non-prescription, but you may need to ask the pharmacist for their location.)  Repeat this at least 5-6 times a year.

## 2018-10-22 NOTE — PLAN OF CARE
Pt AAOx3. Resp even, unlabored. No complaints of pain at this time. NAD noted. Pt tolerating PO well. Discharge and follow-up instructions discussed. Pt and family verbalize understanding. Pt ready for discharge.

## 2018-10-22 NOTE — ANESTHESIA POSTPROCEDURE EVALUATION
"Anesthesia Post Evaluation    Patient: Charmaine Whalen    Procedure(s) Performed: Procedure(s) (LRB):  COLONOSCOPY (N/A)    Final Anesthesia Type: general  Patient location during evaluation: PACU  Patient participation: Yes- Able to Participate  Level of consciousness: awake and alert and oriented  Post-procedure vital signs: reviewed and stable  Pain management: adequate  Airway patency: patent  PONV status at discharge: No PONV  Anesthetic complications: no      Cardiovascular status: blood pressure returned to baseline and stable  Respiratory status: unassisted and spontaneous ventilation  Hydration status: euvolemic  Follow-up not needed.        Visit Vitals  /67 (BP Location: Left arm, Patient Position: Sitting)   Pulse 68   Temp 36.5 °C (97.7 °F) (Skin)   Resp 16   Ht 5' 7" (1.702 m)   Wt 92.1 kg (203 lb)   SpO2 97%   Breastfeeding? No   BMI 31.79 kg/m²       Pain/Ray Score: Pain Assessment Performed: Yes (10/22/2018  9:32 AM)  Presence of Pain: denies (10/22/2018  9:32 AM)  Ray Score: 10 (10/22/2018  9:32 AM)        "

## 2018-10-22 NOTE — H&P
History & Physical - Short Stay  Gastroenterology      SUBJECTIVE:     Procedure: Colonoscopy    Chief Complaint/Indication for Procedure: Surveillance, Hx of colon cancer and hx of polyps    History of Present Illness:  October 14, 2018   Charmaine Whalen   to Me       10:04 AM   Dr. Rodriguez.can you please set up a colonoscopy for me. I think I saw a streak of blood in my stool yesterday.  I'm concerned due to my history of colon cancer also because of pains in my stomach. Thx       See last Colonoscopy 6/19/2015  Indications:         High risk colon cancer surveillance: Personal                        history of colonic polyps, High risk colon cancer                        surveillance: Personal history of colon cancer ~2011                        (DANI), Last colonoscopy: May 2010 (Ashish)  Impression:  - Four 1 to 4 mm polyps at the hepatic flexure and                        in the distal ascending colon. Resected and                        retrieved.                       - Diverticulosis in the sigmoid colon.                       - Patent end-to-end ileo-colonic anastomosis,                        characterized by healthy appearing mucosa.                       - Redundant colon.                       - The examination was otherwise normal.                       - The examined portion of the ileum was normal.  Recommendation:      - Discharge patient to home.                       - Await pathology results.                       - Repeat colonoscopy in 3 years for surveillance.                       - High fiber diet.                       - Use fiber, for example Citrucel, Fibercon, Konsyl                        or Metamucil.                       - Take a PROBIOTIC, such as a carton of GREEK YOGURT                        (Chobani or Oikos, or Activia or Dannon); or tablets                        of ALIGN or CULTURELLE or GLEN-Q (all                        non-prescription), every day for a month.                        - Call the G.I. clinic in 2 weeks for reports (if                        you haven't heard from us sooner) [phone].                       - Continue present medications.                       - Patient has a contact number available for                        emergencies. The signs and symptoms of potential                        delayed complications were discussed with the                        patient. Return to normal activities tomorrow.                        Written discharge instructions were provided to the                        patient.                       - Return to normal activities tomorrow.                       - !!! USE ADULT COLONOSCOPE FOR FUTURE EXAMS. !!!  Cooper Rodriguez MD  6/19/2015 1    SPECIMEN  1) Ascending colon polyp.  FINAL PATHOLOGIC DIAGNOSIS  COLON, ASCENDING POLYP (BIOPSY):  -Tubulovillous adenoma        PTA Medications   Medication Sig    aspirin (ECOTRIN) 81 MG EC tablet Take 1 tablet (81 mg total) by mouth once daily. (Patient taking differently: Take 81 mg by mouth every evening. )    b complex vitamins tablet Take 1 tablet by mouth every morning.     buPROPion (WELLBUTRIN XL) 300 MG 24 hr tablet Take 1 tablet (300 mg total) by mouth every morning.    cholecalciferol, vitamin D3, (VITAMIN D) 1,000 unit capsule Take 2,000 Units by mouth every morning.     docusate sodium (STOOL SOFTENER) 100 mg capsule Take 100 mg by mouth 2 (two) times daily. (Patient taking differently: Take 100 mg by mouth every evening. )    gabapentin (NEURONTIN) 300 MG capsule TAKE 2 CAPSULES BY MOUTH TWICE DAILY    levothyroxine (SYNTHROID) 100 MCG tablet TAKE 1 TABLET BY MOUTH EVERY DAY    pantoprazole (PROTONIX) 40 MG tablet TAKE 1 TABLET BY MOUTH EVERY DAY    ranitidine (ZANTAC) 300 MG tablet TAKE 1 TABLET BY MOUTH EVERY EVENING 30 TO 60 MINUTES BEFORE BEDTIME    SAVELLA 50 mg Tab TAKE 1 TABLET BY MOUTH TWICE DAILY    trazodone (DESYREL) 100 MG tablet TAKE 1 TO 2  TABLETS BY MOUTH EVERY NIGHT AT BEDTIME FOR SLEEP    oxycodone-acetaminophen (PERCOCET)  mg per tablet Take 1 tablet by mouth every 6 (six) hours as needed for Pain.       Review of patient's allergies indicates:   Allergen Reactions    Sulfa (sulfonamide antibiotics) Hives     Other reaction(s): Hives        Past Medical History:   Diagnosis Date    *Atrial fibrillation     History of Cardiac Ablation    Allergy     Anemia     Anticoagulant long-term use     ASA 81 mg    Anxiety     Atherosclerotic heart disease of native coronary artery with angina pectoris     2/17 [facility] Mild, moderate diagonal disease. Small distal LAD. Mild diastolic dysfunction.    Cataract     OU    Chronic pain     Leg, lower back    Colon cancer     Colon polyp     DDD (degenerative disc disease), lumbar     h/o sciatica    Depression     Encounter for blood transfusion     Esophageal stenosis     Fibromyalgia     Gastritis     GERD (gastroesophageal reflux disease)     hiatal as well    Hiatal hernia     History of psychiatric hospitalization     1997 at [facility]    History of renal calculi     Hypersomnia with sleep apnea     uses C-pap    Hypothyroidism 1/28/2013    Hashimoto's, with Hypothyroidism    Irritable bowel syndrome     Meningitis     age 3    Myelodysplastic syndrome     bone marrow cancer    CORWIN (obstructive sleep apnea)     doesn't use her cpap    Personal history of colonic polyps     PVD (posterior vitreous detachment)     OU    Retinal detachment     HST-OS    Rheumatic fever     as a child    Supraventricular tachycardia     Thrombocytopenia      Past Surgical History:   Procedure Laterality Date    APPENDECTOMY      ATRIAL ABLATION SURGERY      BACK SURGERY      Right Spinal Cord Stimulator    BIOPSY, BONE MARROW N/A 2/22/2012    [facility]    BONE MARROW BIOPSY      myelodysplastic syndrome    BREAST BIOPSY Left 2016    fibrocystic     CHOLECYSTECTOMY  11/26/2001    Laparascopic    COLON SURGERY  ~2011  (Peleas?  LKVW)    Colon Resection (sigmoid?)    COLONOSCOPY  6/2015    Dr. rodriguez, repeat in 3 years    COLONOSCOPY N/A 6/19/2015    Performed by Cooper Rodriguez Jr., MD at [facility] ENDO    COLONOSCOPY W/ BIOPSIES AND POLYPECTOMY  5/05/2010  [place]    COLONOSCOPY W/ POLYPECTOMY  6/19/2015  Michael    Four 1 to 4 mm polyps at the hepatic flexure and distal ascending colon.  -Tubulovillous adenoma.   Diverticulosis in the sigmoid colon.  Patent end-to-end ileo-colonic anastomosis in the proximal/mid ascending colon.   Redundant colon.      Epidural Steroid Injection      Previous injection 17 years ago with Dr. Pang, Pain Management, 1 with Dr. Samuels    NIC-TRANSFORAMINAL L3 and L5 Left 10/14/2015    Performed by Adan Samuels MD at [facility] OR    NIC-TRANSFORAMINAL L4 Left 1/13/2016    Performed by [facility]els [facility] at [facility] OR    ESOPHAGOGASTRODUODENOSCOPY  11/06/2008    GERD & Gastritis    ESOPHAGOGASTRODUODENOSCOPY (EGD) N/A 12/1/2015    Performed by Cooper Rodriguez Jr., MD at [facility] ENDO    EYE SURGERY      Focal Laser OU    HEART CATH-LEFT-RM # 220 B Left 2/18/2017    [facility] CATH    HYSTERECTOMY      ovaries spared    INJECTION, STEROID, SPINE, EPIDURAL, CAUDAL N/A 5/7/2014    Performed by [facility]els [facility] at [facility] OR    INJECTION-FACET L5/S1 Bilateral 7/14/2017    Performed by [facility]els [facility] at [facility] OR    INJECTION-STEROID-EPIDURAL-CAUDAL N/A 4/25/2017    Performed [facility] at [facility] OR    INJECTION-STEROID-EPIDURAL-CAUDAL N/A 9/10/2015    Per[facility] by [facility] at [facility] OR    INJECTION-STEROID-EPIDURAL-CAUDAL N/A 6/17/2014    Performed by [facility] at [facility] OR    INSERTION-STIMULATOR-DORSAL COLUMN N/A 10/23/2017    Performed by [facility]els [facility] at [facility] OR    Lumbar Steroid Injection      Pain management    REMOVAL-HARDWARE SPINE  "N/A 3/16/2016    [facility] OR 2ND FLR    SCS REMOVAL  3/16/16    [facility]    SPINAL FUSION  1999    L4-L5    SPINE SURGERY  3/16/16    L4-5 PEDICLE SCREW & RODS REMOVED/[facility]    TRIAL-STIMULATOR-DORSAL COLUMN N/A 9/18/2017    [facility] OR    TUBAL LIGATION      UPPER GASTROINTESTINAL ENDOSCOPY  12/2015    Dr. Rodriguez    VAGINAL DELIVERY      times 3     Family History   Problem Relation Age of Onset    COPD Mother     Heart disease Mother     Cancer Mother         Breast    Breast cancer Mother 75    Stroke Father     Heart failure Father     Diabetes Father     Diabetes Sister      Social History     Tobacco Use    Smoking status: Never Smoker    Smokeless tobacco: Never Used   Substance Use Topics    Alcohol use: No    Drug use: No         OBJECTIVE:     Vital Signs (Most Recent)  Temp: 97.9 °F (36.6 °C) (10/22/18 0751)  Pulse: 70 (10/22/18 0751)  Resp: 15 (10/22/18 0751)  BP: (!) 140/65 (10/22/18 0751)  SpO2: 100 % (10/22/18 0751)    Physical Exam:  : Ht 5' 7" (1.702 m)   Wt 94.3 kg (207 lb 12.5 oz)   BMI 32.54 kg/m²                               GENERAL:  Comfortable, in no acute distress.                                 HEENT EXAM:  Nonicteric.  No adenopathy.  Oropharynx is clear.               NECK:  Supple.                                                               LUNGS:  Clear.                                                               CARDIAC:  Regular rate and rhythm.  S1, S2.  No murmur.                      ABDOMEN:  Soft, positive bowel sounds, nontender.  No hepatosplenomegaly or masses.  No rebound or guarding.                                             EXTREMITIES:  No edema.     MENTAL STATUS:  Alert and oriented.    ASSESSMENT/PLAN:     Assessment: Surveillance, Hx of colon cancer and hx of polyps    Plan: Colonoscopy    Anesthesia Plan:   MAC / General Anaesthesia    ASA Grade: ASA 2 - Patient with mild " systemic disease with no functional limitations    MALLAMPATI SCORE: II (hard and soft palate, upper portion of tonsils anduvula visible)

## 2018-10-22 NOTE — ANESTHESIA PREPROCEDURE EVALUATION
10/22/2018  Charmaine Whalen is a 68 y.o., female.    Anesthesia Evaluation    I have reviewed the Patient Summary Reports.    I have reviewed the Nursing Notes.   I have reviewed the Medications.     Review of Systems  Anesthesia Hx:  No problems with previous Anesthesia    Social:  Non-Smoker    Hematology/Oncology:        Hematology Comments: Myelodysplastic Syndrome - causing thrombocytopenia   Cardiovascular:   Valvular problems/Murmurs (mod/severe), MR CAD asymptomatic Dysrhythmias (s/p Ablation and resolved now, Hx of SVT) atrial fibrillation Angina, with exertion PVD RAMIREZ EF 55%   Pulmonary:   Shortness of breath Sleep Apnea    Renal/:   renal calculi    Hepatic/GI:   Hiatal Hernia, GERD, well controlled IBS   Musculoskeletal:   Arthritis     Neurological:   Neuromuscular Disease, (Fibromyalgia)    Endocrine:   Hypothyroidism    Psych:   Psychiatric History anxiety          Physical Exam  General:  Well nourished, Obesity    Airway/Jaw/Neck:  Airway Findings: Mouth Opening: Normal Tongue: Normal  General Airway Assessment: Adult  Oropharynx Findings:  Mallampati: II  Jaw/Neck Findings:  Neck ROM: Normal ROM     Eyes/Ears/Nose:  Eyes/Ears/Nose Findings:    Dental:  Dental Findings:   Chest/Lungs:  Chest/Lungs Findings: Normal Respiratory Rate     Heart/Vascular:  Heart Findings: Rate: Normal  Rhythm: Regular Rhythm        Mental Status:  Mental Status Findings:  Cooperative, Alert and Oriented         Anesthesia Plan  Type of Anesthesia, risks & benefits discussed:  Anesthesia Type:  general  Patient's Preference:   Intra-op Monitoring Plan: standard ASA monitors  Intra-op Monitoring Plan Comments:   Post Op Pain Control Plan: multimodal analgesia  Post Op Pain Control Plan Comments:   Induction:   IV  Beta Blocker:  Patient is not currently on a Beta-Blocker (No further documentation required).    "    Informed Consent: Patient understands risks and agrees with Anesthesia plan.  Questions answered. Anesthesia consent signed with patient.  ASA Score: 3     Day of Surgery Review of History & Physical:  There are no significant changes.   H&P completed by Anesthesiologist.   Anesthesia Plan Notes: Pt had previous colonoscopy where she "woke up in the middle" and is concerned about recall.  I advised her we use General now and it won't be a problem.        Ready For Surgery From Anesthesia Perspective.       "

## 2018-10-22 NOTE — [employer] PATIENT INSTRUCTIONS
Discharge Summary/Instructions for after Colonoscopy with   Biopsy/Polypectomy  Charmaine Whalen    Monday, October 22, 2018  Cooper Rodriguez MD  RESTRICTIONS ON ACTIVITY:  - Do not drive a car or operate machinery until the day after the procedure.      - The following day: return to full activity including work.  - For  3 days: No heavy lifting, straining or running.  - Diet: You can have solid foods, but no gassy foods (i.e. beans, broccoli,   cabbage, etc).  TREATMENT FOR COMMON SIDE EFFECTS:  - Mild abdominal pain and bloating or excessive gas: rest, eat lightly and   use a heating pad.  SYMPTOMS TO WATCH FOR AND REPORT TO YOUR PHYSICIAN:  1. Severe abdominal pain.  2. Fever within 24 hours after a procedure.  3. A large amount of rectal bleeding. (A small amount of blood from the   rectum is not serious, especially if hemorrhoids are present.  3.  Because air was put into your colon during the procedure, expelling   large amounts of air from your rectum is normal.  4.  You may not have a bowel movement for 1-3 days because of the   colonoscopy prep.  This is normal.  5.  Call immediately if you notice any of the following:   Chills and/or fever over 101   Persistent vomiting   Severe abdominal pain, other than gas cramps   Severe chest pain   Black, tarry stools   Any bleeding - exceeding one tablespoon  Your doctor recommends these additional instructions:  We are waiting for your pathology results.   Your physician has recommended a repeat colonoscopy in 3 years for   surveillance.   Eat a high fiber diet.   Take a fiber supplement, for example Citrucel, Fibercon, Konsyl or   Metamucil.   Take a PROBIOTIC, such as a carton of GREEK YOGURT (Chobani or Oikos,  or   Activia or Dannon);  or tablets of ALIGN or CULTURELLE or GLEN-Q (all   non-prescription), every day for a month.   And repeat this at least 5-6   times a year.  Take Miralax 1 capful (17 grams) in 8 ounces of water by mouth once a day as    needed (up to 3-4 times a day if needed).   Call the G.I. clinic in 2 weeks for reports (if you haven't heard from us   sooner)  [phone].  None  If you have any questions or problems, please call your physician.  EMERGENCY PHONE NUMBER: [phone]  LAB RESULTS: Call in two (2) weeks for lab results, [phone]  ___________________________________________  Nurse Signature  ___________________________________________  Patient/Designated Responsible Party Signature  Cooper Rodriguez MD  10/22/2018 9:42:35 AM  This report has been verified and signed electronically.  [employer]

## 2018-11-01 DIAGNOSIS — F33.0 MDD (MAJOR DEPRESSIVE DISORDER), RECURRENT EPISODE, MILD: ICD-10-CM

## 2018-11-01 RX ORDER — BUPROPION HYDROCHLORIDE 300 MG/1
TABLET ORAL
Qty: 90 TABLET | Refills: 0 | Status: SHIPPED | OUTPATIENT
Start: 2018-11-01 | End: 2018-12-03 | Stop reason: SDUPTHER

## 2018-11-06 ENCOUNTER — PATIENT MESSAGE (OUTPATIENT)
Dept: PSYCHIATRY | Facility: CLINIC | Age: 68
End: 2018-11-06

## 2018-11-06 DIAGNOSIS — F33.0 MDD (MAJOR DEPRESSIVE DISORDER), RECURRENT EPISODE, MILD: ICD-10-CM

## 2018-11-06 DIAGNOSIS — G89.4 CHRONIC PAIN SYNDROME: ICD-10-CM

## 2018-11-06 RX ORDER — MILNACIPRAN HYDROCHLORIDE 50 MG/1
TABLET, FILM COATED ORAL
Qty: 60 TABLET | Refills: 0 | OUTPATIENT
Start: 2018-11-06

## 2018-11-07 ENCOUNTER — PATIENT MESSAGE (OUTPATIENT)
Dept: GASTROENTEROLOGY | Facility: CLINIC | Age: 68
End: 2018-11-07

## 2018-11-08 DIAGNOSIS — G89.4 CHRONIC PAIN SYNDROME: ICD-10-CM

## 2018-11-08 DIAGNOSIS — F33.0 MDD (MAJOR DEPRESSIVE DISORDER), RECURRENT EPISODE, MILD: ICD-10-CM

## 2018-11-30 ENCOUNTER — PATIENT MESSAGE (OUTPATIENT)
Dept: PSYCHIATRY | Facility: CLINIC | Age: 68
End: 2018-11-30

## 2018-12-03 ENCOUNTER — OFFICE VISIT (OUTPATIENT)
Dept: PSYCHIATRY | Facility: CLINIC | Age: 68
End: 2018-12-03
Payer: MEDICARE

## 2018-12-03 VITALS
BODY MASS INDEX: 32.18 KG/M2 | WEIGHT: 205 LBS | DIASTOLIC BLOOD PRESSURE: 78 MMHG | HEIGHT: 67 IN | SYSTOLIC BLOOD PRESSURE: 134 MMHG | HEART RATE: 71 BPM

## 2018-12-03 DIAGNOSIS — F33.0 MDD (MAJOR DEPRESSIVE DISORDER), RECURRENT EPISODE, MILD: ICD-10-CM

## 2018-12-03 DIAGNOSIS — F41.1 GAD (GENERALIZED ANXIETY DISORDER): ICD-10-CM

## 2018-12-03 DIAGNOSIS — F41.1 GENERALIZED ANXIETY DISORDER: ICD-10-CM

## 2018-12-03 DIAGNOSIS — F33.1 MDD (MAJOR DEPRESSIVE DISORDER), RECURRENT EPISODE, MODERATE: Primary | ICD-10-CM

## 2018-12-03 DIAGNOSIS — G47.09 OTHER INSOMNIA: ICD-10-CM

## 2018-12-03 DIAGNOSIS — G89.4 CHRONIC PAIN SYNDROME: ICD-10-CM

## 2018-12-03 PROCEDURE — 99999 PR PBB SHADOW E&M-EST. PATIENT-LVL III: CPT | Mod: PBBFAC,,, | Performed by: PSYCHIATRY & NEUROLOGY

## 2018-12-03 PROCEDURE — 99214 OFFICE O/P EST MOD 30 MIN: CPT | Mod: S$PBB,,, | Performed by: PSYCHIATRY & NEUROLOGY

## 2018-12-03 PROCEDURE — 99213 OFFICE O/P EST LOW 20 MIN: CPT | Mod: PBBFAC | Performed by: PSYCHIATRY & NEUROLOGY

## 2018-12-03 RX ORDER — FLUOXETINE HYDROCHLORIDE 40 MG/1
40 CAPSULE ORAL DAILY
Qty: 90 CAPSULE | Refills: 1 | Status: SHIPPED | OUTPATIENT
Start: 2018-12-03 | End: 2019-07-27 | Stop reason: SDUPTHER

## 2018-12-03 RX ORDER — TRAZODONE HYDROCHLORIDE 100 MG/1
TABLET ORAL
Qty: 180 TABLET | Refills: 1 | Status: SHIPPED | OUTPATIENT
Start: 2018-12-03 | End: 2019-10-31 | Stop reason: SDUPTHER

## 2018-12-03 RX ORDER — BUPROPION HYDROCHLORIDE 300 MG/1
300 TABLET ORAL EVERY MORNING
Qty: 90 TABLET | Refills: 1 | Status: SHIPPED | OUTPATIENT
Start: 2018-12-03 | End: 2019-08-06 | Stop reason: SDUPTHER

## 2018-12-03 NOTE — PATIENT INSTRUCTIONS
Restart Prozac 40 mg: take 1 capsule every other day for 8 days, then increase to 1 capsule daily.    Cut the 100 mg Savella in 1/2 for 8 days (50 mg/day), then discontinue (when increasing Prozac to 40 mg daily).    Continue all other current medications with refills as noted.

## 2018-12-03 NOTE — PROGRESS NOTES
"Outpatient Psychiatry Follow-Up Visit (MD/NP)   12/3/2018    Clinical Status of Patient: Outpatient (Ambulatory)     Session Length: 30 minutes (E&M)      Chief Complaint: Charmaine Whalen is a 68 y.o. female who presents today for follow-up of depression and anxiety.   Met with patient.     Interval History and Content of Current Session:   General impression: First follow up since 06/04/2018. She notes latest events -- see below.      Target symptoms: Hypersomnia, excessive worry, decreased interests/motivation, excessive guilt, low energy, poor concentration, decreased appetite, crying spells, poor self-esteem, irritability, impatient with increased likelihood of losing temper, feelings of hopelessness and worthlessness; occasional passive S.I.; denies active S.I.     Interim events:    Med plan from last appt:  "Take Savella 12.5 mg one tablet daily with a meal for one week, then increase dose to one tablet twice daily with meals.  After 1 week (week 3), can try to increase dose to 12.5 mg one in AM and 25 mg one in PM with meals.    After 1 more week (week 4), can try to increase dose to 25 mg twice daily with meals.  At this point, cut dose of Prozac to 20 mg daily.    After 2 more weeks, (week 6), increase Savella to 25 mg in AM and 50 mg in PM with meals.   After 2 more weeks (week 8), increase Savella to 50 mg twice daily with meals.  Also, STOP Prozac.    Continue Wellbutrin and trazodone as pt is currently taking."     Message from pt to me last week:  "Dr. Souza, I hate to put this on you at the last minute......I need to get off of the savella. I'm having nausea and have felt sick for the past month or so. I believe it is the savella that's making me feel bad. I hope you get this message before my appointment on Monday. Maybe you'll have time to research how to proceed with this. Thanks (Charmaine)."    I told pt to begin tapering off of the Savella.      In session, pt states she is NOT good today.  She " "states she feels horrible.    She has a lot of stress in her life -- "it's hard to handle it".   She takes trazodone nightly.  She awakens with L leg pain at night.    She is having a hard time falling back to sleep -- "my mind won't stop; it won't slow".    Discussed about trial of another antidepressant medication -- pt agreed with a trial of Prozac.     Also, touched on possibility for ECT.  I gave pt some information about this treatment modality.  Pt plans to f/u with a mental health provider in [place] after the move.      Pt and her  are still planning to move north of [place], [place], to be closer to family.    They want to live close to her other daughter (Kriss); she has a son (now 3 yo) and lives north of [place].  This daughter has a Masters degree in Nursing and is an NP.  Her  is a  for [employer]    She notes her  retired in April 2017; he is 69 yo.          She still goes to Al-Anon meetings and she tries to play cards once a week.      Current SIGECAPS:    Sleep -- Poor due mainly to pain.  She continues to take trazodone 100 - 200 mg qhs nightly.    Interests -- decreased   Guilt -- some due to can't do what she wants/needs to do  Energy -- low  Concentration -- poor ("I sit there and stare")  Appetite -- "OK"; not losing wt.    Psychomotor -- decreased   Suicidal ideation -- denies   "I don't want to even think about the future".      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    She generally does not take anything for pain.    Pain limits her overall function.    She will only take oxycodone if she is in severe pain.  "I don't want the addiction".      Pt also suffers from the Myeloid Dysplastic Syndrome Dx in March 2012 and continues to f/u with her oncologist (Christiano Burgos), now every 6 months. She has pancytopenia, hypothyroidism, and previously had a vitamin D deficiency (still taking supplements), as well as chronic pain.  She still constantly feels " "tired.     From previous sessions:  Her mom passed away in March '15.  She feels OK, though she still needs to bury her ashes in a plot right next to her second  (the ashes are in an urn on her mantle).  She also has an alexus that she wants to have placed where she wants her ashes buried -- I recommended she speak with the man who owns and manages the cemetary property.      Past Psychiatric History: First episode of MDD in '97 when hospitalized for 3 weeks on acute psych. unit @ [facility]. Denies other psych. hospitalizations. Denies prior suicide attempts. Denies Hx. of manic or psychotic Sx. Has had previous therapists for psychotherapy and medication management.     Prior Psychotropic Medications: Prozac (13 yrs ago; stopped working), Effexor XR x 2 yrs, then stopped working (max. dose = 225 mg/d), Cymbalta ("stared at the walls"), Wellbutrin (? max dosage; also stopped working), Pristiq ("never worked"), Abilify ("no effect"), May have also taken Zoloft, Paxil and Celexa? Has never tried Lamictal, Trileptal, lithium, Seroquel, Risperdal, Zyprexa.     PSYCHOTHERAPY -- N/A.      Review of Systems   · PSYCHIATRIC: Pertinant items are noted in the narrative.  · CONSTITUTIONAL: No significant recent wt changes.    · MUSCULOSKELETAL: No pain or stiffness of the joints.  · NEUROLOGIC: No weakness, sensory changes, seizures, confusion, memory loss, tremor or other abnormal movements.  · ENDOCRINE: No polydipsia or polyuria.  · INTEGUMENTARY: No rashes or lacerations.  · EYES: No exophthalmos, jaundice or blindness.  · ENT: No dizziness, tinnitus or hearing loss.  · RESPIRATORY: No shortness of breath.  · CARDIOVASCULAR: No tachycardia. + occasional chest pain.  · GASTROINTESTINAL: No nausea, vomiting, pain, constipation or diarrhea.  · GENITOURINARY: No frequency, dysuria.    Current Psychotropic Medications:   traZODONE (Trazodone) 100mg   1 - 2 Tablet(s) Oral (by mouth) At bedtime (GENERALLY TAKES JUST 100 MG " "AT NIGHT)   FLUoxetine (Fluoxetine) 40mg   1 Capsule(s) Oral (by mouth) Every day   Wellbutrin XL (bupropion HCl) 300mg (Tablet Sustained Release 24 hr)  1 Tablet(s) Oral (by mouth) Every morning    Gabapentin 300 mg   1 tab TID (Rx BY ANOTHER PROVIDER)    Compliance: yes     Side effects: None     Risk Parameters:   Patient reports no suicidal ideation   Patient reports no homicidal ideation   Patient reports no self-injurious behavior   Patient reports no violent behavior     Patient's Response to Intervention:   The patient's response to intervention is accepting.     Progress Toward Goals and Other Mental Status Changes:   The patient's progress toward goals is fair.     Vitals: Most recent vital signs were reviewed:     Vitals - 1 value per visit 7/30/2018 10/19/2018 10/22/2018 12/3/2018   SYSTOLIC 102  118 134   DIASTOLIC 70  67 78   PULSE 87  68 71   TEMPERATURE 99.1  97.7    RESPIRATIONS   16    SPO2 97  97    Weight (lb) 207.78 203  205.03   Weight (kg) 94.25 92.08  93   HEIGHT 5' 7" 5' 7"  5' 7"   BODY MASS INDEX 32.54  31.79 32.11   VISIT REPORT       Pain Score  0          Vitals - 1 value per visit 4/5/2018 5/4/2018 5/24/2018 6/4/2018   SYSTOLIC 116 126 131 129   DIASTOLIC 70 84 65 67   PULSE 64 69 60 66   TEMPERATURE 99.2 98.9 99.3    RESPIRATIONS   18    SPO2 99 99 99    Weight (lb) 201.28 199.2 202.82 203.71   Weight (kg) 91.3 90.357 92 92.4   HEIGHT 5' 7" 5' 7"     BODY MASS INDEX 31.52 31.2 31.77 31.9   VISIT REPORT       Pain Score  0 1 4        Vitals - 1 value per visit 3/17/2016 3/30/2016 4/19/2016 4/27/2016   SYSTOLIC 120 122 116 138   DIASTOLIC 62 65 67 66   PULSE 78 65 69 67   TEMPERATURE 98.2 98.3 98.7    RESPIRATIONS 17      SPO2 97      Weight (lb)  205 213 213   HEIGHT  5' 7" 5' 7" 5' 7"   BODY MASS INDEX  32.1 33.35 33.36   VISIT REPORT           Vitals - 1 value per visit 7/15/2014 12/4/2014 1/7/2015   SYSTOLIC 110 102 120   DIASTOLIC 70 70 80   PULSE 60 60 60   TEMPERATURE  98.5  " "  RESPIRATIONS 18     Weight (lb) 205.5 203 210.8   HEIGHT  5' 7"    BODY MASS INDEX 31.25 31.79 33.01   VISIT REPORT          Mental Status Evaluation      Appearance:  unremarkable, age appropriate, casually dressed, neatly groomed    Behavior:  normal, cooperative, eye contact normal    Speech:  normal tone, normal pitch, normal volume, minimally spontaneous   Mood:  "OK" (denies feeling significant depression)    Affect:  Grave, constricted   Thought Process:  goal-directed, logical    Thought Content:  normal, no suicidality, no homicidality, delusions, or paranoia    Sensorium:  grossly intact    Attention Span & Concentration  able to focus    Cognition:  fund of knowledge intact and appropriate to age and level of education    Insight:  good    Judgment:  adequate to circumstances, good      Impression:   Major Depressive Disorder, Recurrent, Moderate  Generalized Anxiety Disorder   Chronic Pain Syndrome    Plan:   Medication management -- Restart Prozac 40 mg: take 1 capsule every other day for 8 days, then increase to 1 capsule daily.    Cut the 100 mg Savella in 1/2 for 8 days, then discontinue (when increasing Prozac to daily).    Continue all other current medications with refills as noted.      Additional Notes: N/A.      Return to Clinic:  PRN.  Pt plans to find a psychiatrist in the [place] area.  I highly recommended [facility] Dept of Psychiatry clinic for both psychotherapy, med management and possible ECT.    "

## 2018-12-05 ENCOUNTER — LAB VISIT (OUTPATIENT)
Dept: LAB | Facility: HOSPITAL | Age: 68
End: 2018-12-05
Attending: INTERNAL MEDICINE
Payer: MEDICARE

## 2018-12-05 DIAGNOSIS — D46.9 MDS (MYELODYSPLASTIC SYNDROME): ICD-10-CM

## 2018-12-05 DIAGNOSIS — Z85.038 HISTORY OF COLON CANCER: ICD-10-CM

## 2018-12-05 LAB
ALBUMIN SERPL BCP-MCNC: 4.2 G/DL
ALP SERPL-CCNC: 82 U/L
ALT SERPL W/O P-5'-P-CCNC: 24 U/L
ANION GAP SERPL CALC-SCNC: 7 MMOL/L
AST SERPL-CCNC: 28 U/L
BASOPHILS # BLD AUTO: 0.01 K/UL
BASOPHILS NFR BLD: 0.3 %
BILIRUB SERPL-MCNC: 0.3 MG/DL
BUN SERPL-MCNC: 16 MG/DL
CALCIUM SERPL-MCNC: 9.4 MG/DL
CHLORIDE SERPL-SCNC: 104 MMOL/L
CO2 SERPL-SCNC: 30 MMOL/L
CREAT SERPL-MCNC: 0.72 MG/DL
DIFFERENTIAL METHOD: ABNORMAL
EOSINOPHIL # BLD AUTO: 0 K/UL
EOSINOPHIL NFR BLD: 1.1 %
ERYTHROCYTE [DISTWIDTH] IN BLOOD BY AUTOMATED COUNT: 13 %
EST. GFR  (AFRICAN AMERICAN): >60 ML/MIN/1.73 M^2
EST. GFR  (NON AFRICAN AMERICAN): >60 ML/MIN/1.73 M^2
GLUCOSE SERPL-MCNC: 92 MG/DL
HCT VFR BLD AUTO: 31.3 %
HGB BLD-MCNC: 10.3 G/DL
LDH SERPL L TO P-CCNC: 523 U/L
LYMPHOCYTES # BLD AUTO: 1.6 K/UL
LYMPHOCYTES NFR BLD: 43.5 %
MCH RBC QN AUTO: 36.7 PG
MCHC RBC AUTO-ENTMCNC: 32.9 G/DL
MCV RBC AUTO: 111 FL
MONOCYTES # BLD AUTO: 0.3 K/UL
MONOCYTES NFR BLD: 8.9 %
NEUTROPHILS # BLD AUTO: 1.7 K/UL
NEUTROPHILS NFR BLD: 46.2 %
NRBC BLD-RTO: 0 /100 WBC
PLATELET # BLD AUTO: 135 K/UL
PMV BLD AUTO: 9.7 FL
POTASSIUM SERPL-SCNC: 4.3 MMOL/L
PROT SERPL-MCNC: 7.1 G/DL
RBC # BLD AUTO: 2.81 M/UL
SODIUM SERPL-SCNC: 141 MMOL/L
WBC # BLD AUTO: 3.59 K/UL

## 2018-12-05 PROCEDURE — 83615 LACTATE (LD) (LDH) ENZYME: CPT | Mod: PN

## 2018-12-05 PROCEDURE — 80053 COMPREHEN METABOLIC PANEL: CPT | Mod: PN

## 2018-12-05 PROCEDURE — 36415 COLL VENOUS BLD VENIPUNCTURE: CPT | Mod: PN

## 2018-12-05 PROCEDURE — 85025 COMPLETE CBC W/AUTO DIFF WBC: CPT

## 2018-12-05 PROCEDURE — 80053 COMPREHEN METABOLIC PANEL: CPT

## 2018-12-05 PROCEDURE — 83615 LACTATE (LD) (LDH) ENZYME: CPT

## 2018-12-05 PROCEDURE — 85025 COMPLETE CBC W/AUTO DIFF WBC: CPT | Mod: PN

## 2018-12-10 ENCOUNTER — TELEPHONE (OUTPATIENT)
Dept: CARDIOLOGY | Facility: CLINIC | Age: 68
End: 2018-12-10

## 2018-12-10 NOTE — TELEPHONE ENCOUNTER
----- Message from Cherise Kevin sent at 12/10/2018 11:50 AM CST -----  Type:  Sooner Apoointment Request    Caller is requesting a sooner appointment.  Caller declined first available appointment listed below.  Caller will not accept being placed on the waitlist and is requesting a message be sent to doctor.    Name of Caller:  Self   When is the first available appointment?  Symptoms:  NA   Best Call Back Number:  [phone]  Additional Information: Patient was admitted into [facility] for 3 days, patient was discharged Friday 12/07/2018. Patient was advised to followup with the doctor. Pt asking to be seen earlier than January2019

## 2018-12-27 DIAGNOSIS — F41.1 GAD (GENERALIZED ANXIETY DISORDER): ICD-10-CM

## 2018-12-27 DIAGNOSIS — G47.09 OTHER INSOMNIA: ICD-10-CM

## 2018-12-27 RX ORDER — TRAZODONE HYDROCHLORIDE 100 MG/1
TABLET ORAL
Qty: 180 TABLET | Refills: 0 | OUTPATIENT
Start: 2018-12-27

## 2019-01-15 ENCOUNTER — OFFICE VISIT (OUTPATIENT)
Dept: CARDIOLOGY | Facility: CLINIC | Age: 69
End: 2019-01-15
Payer: MEDICARE

## 2019-01-15 VITALS
BODY MASS INDEX: 32.94 KG/M2 | HEIGHT: 67 IN | WEIGHT: 209.88 LBS | SYSTOLIC BLOOD PRESSURE: 122 MMHG | DIASTOLIC BLOOD PRESSURE: 72 MMHG

## 2019-01-15 DIAGNOSIS — I48.0 PAROXYSMAL ATRIAL FIBRILLATION: Primary | Chronic | ICD-10-CM

## 2019-01-15 DIAGNOSIS — I34.0 NON-RHEUMATIC MITRAL REGURGITATION: ICD-10-CM

## 2019-01-15 DIAGNOSIS — R94.39 ABNORMAL THALLIUM STRESS TEST: ICD-10-CM

## 2019-01-15 DIAGNOSIS — I25.119 ATHEROSCLEROSIS OF NATIVE CORONARY ARTERY OF NATIVE HEART WITH ANGINA PECTORIS: ICD-10-CM

## 2019-01-15 PROCEDURE — 99214 PR OFFICE/OUTPT VISIT, EST, LEVL IV, 30-39 MIN: ICD-10-PCS | Mod: S$PBB,,, | Performed by: INTERNAL MEDICINE

## 2019-01-15 PROCEDURE — 99214 OFFICE O/P EST MOD 30 MIN: CPT | Mod: S$PBB,,, | Performed by: INTERNAL MEDICINE

## 2019-01-15 PROCEDURE — 99999 PR PBB SHADOW E&M-EST. PATIENT-LVL III: ICD-10-PCS | Mod: PBBFAC,,, | Performed by: INTERNAL MEDICINE

## 2019-01-15 PROCEDURE — 99999 PR PBB SHADOW E&M-EST. PATIENT-LVL III: CPT | Mod: PBBFAC,,, | Performed by: INTERNAL MEDICINE

## 2019-01-15 PROCEDURE — 99213 OFFICE O/P EST LOW 20 MIN: CPT | Mod: PBBFAC,PO | Performed by: INTERNAL MEDICINE

## 2019-01-15 RX ORDER — AMLODIPINE BESYLATE 2.5 MG/1
2.5 TABLET ORAL NIGHTLY
Qty: 90 TABLET | Refills: 5 | Status: SHIPPED | OUTPATIENT
Start: 2019-01-15 | End: 2020-01-23

## 2019-01-15 RX ORDER — NITROGLYCERIN 0.4 MG/1
0.4 TABLET SUBLINGUAL EVERY 5 MIN PRN
Qty: 30 TABLET | Refills: 12 | Status: SHIPPED | OUTPATIENT
Start: 2019-01-15 | End: 2020-01-15

## 2019-01-15 NOTE — PROGRESS NOTES
Subjective:    Patient ID:  Charmaine Whalen is a 68 y.o. female who presents for follow-up of Hospital Follow Up (sob with exertion)      HPI  Here for follow up of MR/non-obs CAD (12/18 LHC)/new PAF. Patient denies palpitations, syncope, presyncope, lightheadedness or dizziness.  Was in hosp with CP with neg LHC.     Review of Systems   Constitution: Negative for malaise/fatigue.   Eyes: Negative for blurred vision.   Cardiovascular: Negative for chest pain, claudication, cyanosis, dyspnea on exertion, irregular heartbeat, leg swelling, near-syncope, orthopnea, palpitations, paroxysmal nocturnal dyspnea and syncope.   Respiratory: Negative for cough and shortness of breath.    Hematologic/Lymphatic: Does not bruise/bleed easily.   Musculoskeletal: Negative for back pain, falls, joint pain, muscle cramps, muscle weakness and myalgias.   Gastrointestinal: Negative for abdominal pain, change in bowel habit, nausea and vomiting.   Genitourinary: Negative for urgency.   Neurological: Negative for dizziness, focal weakness and light-headedness.        Objective:    Physical Exam   Constitutional: She is oriented to person, place, and time. She appears well-developed and well-nourished.   Neck: Normal range of motion. No JVD present.   Cardiovascular: Normal rate, regular rhythm, normal heart sounds and intact distal pulses.   Pulmonary/Chest: Effort normal and breath sounds normal.   Neurological: She is alert and oriented to person, place, and time.   Skin: Skin is warm and dry.   Psychiatric: She has a normal mood and affect.   Nursing note and vitals reviewed.        ..    Chemistry        Component Value Date/Time     12/05/2018 1559    K 3.7 12/05/2018 1559     12/05/2018 1559    CO2 27 12/05/2018 1559    BUN 16 12/05/2018 1559    CREATININE 0.67 12/05/2018 1559    GLU 96 12/05/2018 1559        Component Value Date/Time    CALCIUM 9.2 12/05/2018 1559    ALKPHOS 96 12/05/2018 1559    AST 64 (H)  12/05/2018 1559    AST 24 11/09/2015 1449    ALT 40 12/05/2018 1559    BILITOT 0.5 12/05/2018 1559    ESTGFRAFRICA >60 12/05/2018 1559    EGFRNONAA >60 12/05/2018 1559            ..  Lab Results   Component Value Date    CHOL 150 12/06/2018    CHOL 143 02/17/2017    CHOL 184 01/08/2015     Lab Results   Component Value Date    HDL 45 12/06/2018    HDL 43 02/17/2017    HDL 53 01/08/2015     Lab Results   Component Value Date    LDLCALC 90.2 12/06/2018    LDLCALC 86.6 02/17/2017    LDLCALC 116.4 01/08/2015     Lab Results   Component Value Date    TRIG 74 12/06/2018    TRIG 67 02/17/2017    TRIG 73 01/08/2015     Lab Results   Component Value Date    CHOLHDL 30.0 12/06/2018    CHOLHDL 30.1 02/17/2017    CHOLHDL 28.8 01/08/2015     ..  Lab Results   Component Value Date    WBC 4.73 12/05/2018    HGB 10.5 (L) 12/05/2018    HCT 30.1 (L) 12/05/2018     (H) 12/05/2018     (L) 12/05/2018       Test(s) Reviewed  I have reviewed the following in detail:  [] Stress test   [] Angiography   [] Echocardiogram   [x] Labs   [] Other:       Assessment:         ICD-10-CM ICD-9-CM   1. Paroxysmal atrial fibrillation I48.0 427.31   2. Non-rheumatic mitral regurgitation I34.0 424.0   3. Atherosclerosis of native coronary artery of native heart with angina pectoris I25.119 414.01     413.9   4. Abnormal thallium stress test R94.39 794.39     Problem List Items Addressed This Visit     Mitral regurgitation    Atherosclerotic heart disease of native coronary artery with angina pectoris    Overview     2/17 [facility]  Mild, moderate diagonal disease.  Small distal LAD.  Mild diastolic dysfunction.         AF (atrial fibrillation) - Primary (Chronic)    Overview     PAF in setting of iatrogenic hyperthyroid                                    s/p AFL RFA 2008         Abnormal thallium stress test    Overview     2/17  Mild, moderate diagonal disease.  Small distal LAD.  Mild diastolic dysfunction.                Plan:           Return  to clinic 6 months   Low level/low impact aerobic exercise 5x's/wk. Heart healthy diet and risk factor modification.    See labs and med orders.  Sop coreg start norvasc. Wean off imdur if sx's increase resume it but sx's appear esophageal reflux.

## 2019-02-25 RX ORDER — GABAPENTIN 300 MG/1
CAPSULE ORAL
Qty: 360 CAPSULE | Refills: 3 | Status: SHIPPED | OUTPATIENT
Start: 2019-02-25 | End: 2020-02-24 | Stop reason: SDUPTHER

## 2019-04-09 DIAGNOSIS — E03.9 HYPOTHYROIDISM, UNSPECIFIED TYPE: ICD-10-CM

## 2019-04-09 RX ORDER — LEVOTHYROXINE SODIUM 100 UG/1
TABLET ORAL
Qty: 90 TABLET | Refills: 0 | Status: SHIPPED | OUTPATIENT
Start: 2019-04-09 | End: 2019-06-27 | Stop reason: SDUPTHER

## 2019-05-03 ENCOUNTER — NURSE TRIAGE (OUTPATIENT)
Dept: ADMINISTRATIVE | Facility: CLINIC | Age: 69
End: 2019-05-03

## 2019-05-03 NOTE — TELEPHONE ENCOUNTER
Reason for Disposition   SEVERE chest pain   Pain also present in shoulder(s) or arm(s) or jaw     Right arm pain    Protocols used: CHEST PAIN-A-OH    Charmaine's  Harlan called to say she has been having severe chest pain.  He gave her 4-5 tums, per Dr Alcantar's suggestion, and no response.  He gave her NTG SL and the chest pain lessened.  She now is reporting right arm pain.  Per [facility] triage protocol, recommend ED for evaluation. [facility] in [facility].  Message to Shashi Villanueva MD, pcp, and Dr Alcantar, her cardiologist.  Please contact caller directly with any additional care advice.        CALL BACK IF:  * Severe chest pain  * Constant chest pain lasting longer than 5 minutes  * Difficulty breathing  * Fever  * You become worse   Detail Level: Generalized Include Location In Plan?: No

## 2019-05-15 ENCOUNTER — TELEPHONE (OUTPATIENT)
Dept: GASTROENTEROLOGY | Facility: CLINIC | Age: 69
End: 2019-05-15

## 2019-05-15 NOTE — TELEPHONE ENCOUNTER
----- Message from Tammy VELOZ Tolu sent at 5/15/2019  5:33 PM CDT -----  Contact: PT Portal Request  Appointment Request From: Charmaine Whalen    With Provider: Dr Rodriguez    Preferred Date Range: 5/20/2019 - 5/20/2019    Preferred Times: Any time    Reason for visit: Reoccuring chest pains could be esophogeal spasms....ER visit on May 3rd    Comments:   Find out what is causing my chest pain. Dr Alcantar says it may be my esophagus spasming. Having a lot of reflux.

## 2019-05-20 ENCOUNTER — PATIENT MESSAGE (OUTPATIENT)
Dept: CARDIOLOGY | Facility: CLINIC | Age: 69
End: 2019-05-20

## 2019-05-20 NOTE — TELEPHONE ENCOUNTER
Spoke with pt; she wants to keep July appt with Dr Alcantar, declined to make sooner follow up with Daniela

## 2019-05-27 ENCOUNTER — LAB VISIT (OUTPATIENT)
Dept: LAB | Facility: HOSPITAL | Age: 69
End: 2019-05-27
Attending: NURSE PRACTITIONER
Payer: MEDICARE

## 2019-05-27 ENCOUNTER — OFFICE VISIT (OUTPATIENT)
Dept: GASTROENTEROLOGY | Facility: CLINIC | Age: 69
End: 2019-05-27
Payer: MEDICARE

## 2019-05-27 VITALS
WEIGHT: 207.25 LBS | HEIGHT: 67 IN | RESPIRATION RATE: 18 BRPM | BODY MASS INDEX: 32.53 KG/M2 | SYSTOLIC BLOOD PRESSURE: 101 MMHG | HEART RATE: 68 BPM | DIASTOLIC BLOOD PRESSURE: 67 MMHG

## 2019-05-27 DIAGNOSIS — Z90.49 S/P CHOLECYSTECTOMY: ICD-10-CM

## 2019-05-27 DIAGNOSIS — R10.811 RIGHT UPPER QUADRANT ABDOMINAL TENDERNESS WITHOUT REBOUND TENDERNESS: ICD-10-CM

## 2019-05-27 DIAGNOSIS — Z85.038 HISTORY OF COLON CANCER: ICD-10-CM

## 2019-05-27 DIAGNOSIS — Z79.01 ANTICOAGULANT LONG-TERM USE: ICD-10-CM

## 2019-05-27 DIAGNOSIS — Z86.2 HISTORY OF ANEMIA: ICD-10-CM

## 2019-05-27 DIAGNOSIS — Z87.898 HISTORY OF CHEST PAIN: ICD-10-CM

## 2019-05-27 DIAGNOSIS — D46.9 MYELODYSPLASTIC SYNDROME: ICD-10-CM

## 2019-05-27 DIAGNOSIS — R12 HEARTBURN: Primary | ICD-10-CM

## 2019-05-27 DIAGNOSIS — Z90.49 HISTORY OF COLON RESECTION: ICD-10-CM

## 2019-05-27 DIAGNOSIS — R74.01 ELEVATED AST (SGOT): ICD-10-CM

## 2019-05-27 DIAGNOSIS — K59.09 CHRONIC CONSTIPATION: ICD-10-CM

## 2019-05-27 LAB
ALBUMIN SERPL BCP-MCNC: 3.7 G/DL (ref 3.5–5.2)
ALP SERPL-CCNC: 112 U/L (ref 55–135)
ALT SERPL W/O P-5'-P-CCNC: 27 U/L (ref 10–44)
AST SERPL-CCNC: 27 U/L (ref 10–40)
BILIRUB DIRECT SERPL-MCNC: 0.2 MG/DL (ref 0.1–0.3)
BILIRUB SERPL-MCNC: 0.4 MG/DL (ref 0.1–1)
PROT SERPL-MCNC: 6.5 G/DL (ref 6–8.4)

## 2019-05-27 PROCEDURE — 99499 RISK ADDL DX/OHS AUDIT: ICD-10-PCS | Mod: S$PBB,,, | Performed by: NURSE PRACTITIONER

## 2019-05-27 PROCEDURE — 99999 PR PBB SHADOW E&M-EST. PATIENT-LVL V: CPT | Mod: PBBFAC,,, | Performed by: NURSE PRACTITIONER

## 2019-05-27 PROCEDURE — 99214 OFFICE O/P EST MOD 30 MIN: CPT | Mod: S$PBB,,, | Performed by: NURSE PRACTITIONER

## 2019-05-27 PROCEDURE — 99999 PR PBB SHADOW E&M-EST. PATIENT-LVL V: ICD-10-PCS | Mod: PBBFAC,,, | Performed by: NURSE PRACTITIONER

## 2019-05-27 PROCEDURE — 99215 OFFICE O/P EST HI 40 MIN: CPT | Mod: PBBFAC,PO | Performed by: NURSE PRACTITIONER

## 2019-05-27 PROCEDURE — 80074 ACUTE HEPATITIS PANEL: CPT

## 2019-05-27 PROCEDURE — 80076 HEPATIC FUNCTION PANEL: CPT

## 2019-05-27 PROCEDURE — 99214 PR OFFICE/OUTPT VISIT, EST, LEVL IV, 30-39 MIN: ICD-10-PCS | Mod: S$PBB,,, | Performed by: NURSE PRACTITIONER

## 2019-05-27 PROCEDURE — 99499 UNLISTED E&M SERVICE: CPT | Mod: S$PBB,,, | Performed by: NURSE PRACTITIONER

## 2019-05-27 PROCEDURE — 36415 COLL VENOUS BLD VENIPUNCTURE: CPT | Mod: PO

## 2019-05-27 RX ORDER — POLYETHYLENE GLYCOL 3350 17 G/17G
17 POWDER, FOR SOLUTION ORAL DAILY
Qty: 510 G | Refills: 2 | Status: SHIPPED | OUTPATIENT
Start: 2019-05-27 | End: 2019-06-26

## 2019-05-27 RX ORDER — ASPIRIN 81 MG/1
81 TABLET ORAL DAILY
COMMUNITY

## 2019-05-27 RX ORDER — OMEPRAZOLE 40 MG/1
40 CAPSULE, DELAYED RELEASE ORAL
Qty: 30 CAPSULE | Refills: 3 | Status: SHIPPED | OUTPATIENT
Start: 2019-05-27 | End: 2019-08-20 | Stop reason: SDUPTHER

## 2019-05-27 RX ORDER — BISACODYL 5 MG
5 TABLET, DELAYED RELEASE (ENTERIC COATED) ORAL DAILY PRN
COMMUNITY

## 2019-05-27 NOTE — PATIENT INSTRUCTIONS
"  GERD (Adult)    The esophagus is a tube that carries food from the mouth to the stomach. A valve at the lower end of the esophagus prevents stomach acid from flowing upward. When this valve doesn't work properly, stomach contents may repeatedly flow back up (reflux) into the esophagus. This is called gastroesophageal reflux disease (GERD). GERD can irritate the esophagus. It can cause problems with swallowing or breathing. In severe cases, GERD can cause recurrent pneumonia or other serious problems.  Symptoms of reflux include burning, pressure or sharp pain in the upper abdomen or mid to lower chest. The pain can spread to the neck, back, or shoulder. There may be belching, an acid taste in the back of the throat, chronic cough, or sore throat or hoarseness. GERD symptoms often occur during the day after a big meal. They can also occur at night when lying down.   Home care  Lifestyle changes can help reduce symptoms. If needed, medicines may be prescribed. Symptoms often improve with treatment, but if treatment is stopped, the symptoms often return after a few months. So most persons with GERD will need to continue treatment.  Lifestyle changes  · Limit or avoid fatty, fried, and spicy foods, as well as coffee, chocolate, mint, and foods with high acid content such as tomatoes and citrus fruit and juices (orange, grapefruit, lemon).  · Dont eat large meals, especially at night. Frequent, smaller meals are best. Do not lie down right after eating. And dont eat anything 3 hours before going to bed.  · Avoid drinking alcohol and smoking. As much as possible, stay away from second hand smoke.  · If you are overweight, losing weight will reduce symptoms.   · Avoid wearing tight clothing around your stomach area.  · If your symptoms occur during sleep, use a foam wedge to elevate your upper body (not just your head.) Or, place 4" blocks under the head of your bed.  Medicines  If needed, medicines can help relieve " the symptoms of GERD and prevent damage to the esophagus. Discuss a medicine plan with your healthcare provider. This may include one or more of the following medicines:  · Antacids to help neutralize the normal acids in your stomach.  · Acid blockers (H2 blockers) to decrease acid production.  · Acid inhibitors (PPIs) to decrease acid production in a different way than the blockers. They may work better, but can take a little longer to take effect.  Take an antacid 30-60 minutes after eating and at bedtime, but not at the same time as an acid blocker.  Try not to take medicines such as ibuprofen and aspirin. If you are taking aspirin for your heart or other medical reasons, talk to your healthcare provider about stopping it.  Follow-up care  Follow up with your healthcare provider or as advised by our staff.  When to seek medical advice  Call your healthcare provider if any of the following occur:  · Stomach pain gets worse or moves to the lower right abdomen (appendix area)  · Chest pain appears or gets worse, or spreads to the back, neck, shoulder, or arm  · Frequent vomiting (cant keep down liquids)  · Blood in the stool or vomit (red or black in color)  · Feeling weak or dizzy  · Fever of 100.4ºF (38ºC) or higher, or as directed by your healthcare provider  Date Last Reviewed: 6/23/2015 © 2000-2017 [employer]. [address] [place] [address], [place] [address]. All rights reserved. This information is not intended as a substitute for professional medical care. Always follow your healthcare professional's instructions.        Uncertain Causes of Chest Pain    Chest pain can happen for a number of reasons. Sometimes the cause can't be determined. If your condition does not seem serious, and your pain does not appear to be coming from your heart, your healthcare provider may recommend watching it closely. Sometimes the signs of a serious problem take more time to appear. Many problems not related to your  heart can cause chest pain.These include:  · Musculoskeletal. Costochondritis, an inflammation of the tissues around the ribs that can occur from trauma or overuse injuries  · Respiratory. Pneumonia, pneumothorax, or pneumonitis (inflammation of the lining of the chest and lungs)  · Gastrointestinal. Esophageal reflux, heartburn, or gallbladder disease  · Anxiety and panic disorders  · Nerve compression and neuritis  · Miscellaneous problems such as aortic aneurysm or pulmonary embolism (a blood clot in the lungs)  Home care  After your visit, follow these recommendations:  · Rest today and avoid strenuous activity.  · Take any prescribed medicine as directed.  · Be aware of any recurrent chest pain and notice any changes  Follow-up care  Follow up with your healthcare provider if you do not start to feel better within 24 hours, or as advised.  Call 911  Call 911 if any of these occur:  · A change in the type of pain: if it feels different, becomes more severe, lasts longer, or begins to spread into your shoulder, arm, neck, jaw or back  · Shortness of breath or increased pain with breathing  · Weakness, dizziness, or fainting  · Rapid heart beat  · Crushing sensation in your chest  When to seek medical advice  Call your healthcare provider right away if any of the following occur:  · Cough with dark colored sputum (phlegm) or blood  · Fever of 100.4ºF (38ºC) or higher, or as directed by your healthcare provider  · Swelling, pain or redness in one leg  · Shortness of breath  Date Last Reviewed: 12/30/2015  © 6384-1877 [employer]. [address] [place] [address], [place] [address]. All rights reserved. This information is not intended as a substitute for professional medical care. Always follow your healthcare professional's instructions.

## 2019-05-27 NOTE — PROGRESS NOTES
Subjective:       Patient ID: Charmaine Whalen is a 69 y.o.  female Body mass index is 32.46 kg/m².    Chief Complaint: Gastroesophageal Reflux (ER f/u )    Established patient of Dr. Rodriguez & myself.    Patient is here for follow-up from ER visit for chest pain on 5/3/19 (visit note reviewed).    Gastroesophageal Reflux   She complains of belching (frequent), chest pain (recurred on 5/3/19 (had 2 prior episodes),no relief with tums; relieved with nitro; seeing Dr. Alcantar who thinks she may have esophageal spasms, denies any chest pain since then), globus sensation, heartburn (a few times a week) and nausea (occasional). She reports no abdominal pain, no choking, no coughing, no dysphagia, no hoarse voice, no sore throat or no water brash. This is a chronic (for several years) problem. Nothing aggravates the symptoms. Associated symptoms include anemia. Pertinent negatives include no fatigue, melena or weight loss (on portion control diet since 4/2019; no weight loss yet). Risk factors include obesity, hiatal hernia and caffeine use. She has tried a PPI and a histamine-2 antagonist (protonix 40 mg once daily in the AM and zantac 300 mg once daily in PM, PAST: probiotics, nexium worked but stopped due to cost) for the symptoms. The treatment provided significant relief. Past procedures include an EGD.     Review of Systems   Constitutional: Negative for appetite change, chills, diaphoresis, fatigue, fever and weight loss (on portion control diet since 4/2019; no weight loss yet).   HENT: Negative for congestion, hoarse voice, sore throat and trouble swallowing.    Respiratory: Negative for cough, choking, chest tightness and shortness of breath.    Cardiovascular: Positive for chest pain (recurred on 5/3/19 (had 2 prior episodes),no relief with tums; relieved with nitro; seeing Dr. Alcantar who thinks she may have esophageal spasms, denies any chest pain since then).   Gastrointestinal: Positive for constipation  (chronic, bowel movements once a week hard stools, denies straining; takes colace 100 mg daily, dulcolax prn- about once a week), heartburn (a few times a week) and nausea (occasional). Negative for abdominal distention, abdominal pain, anal bleeding, blood in stool, diarrhea, dysphagia, melena, rectal pain and vomiting.   Genitourinary: Negative for difficulty urinating, dysuria, flank pain, frequency and pelvic pain.   Musculoskeletal: Positive for back pain (history of back pain; percocet PRN- takes rarely). Negative for myalgias.   Neurological: Negative for weakness.       No LMP recorded. Patient has had a hysterectomy.    Past Medical History:   Diagnosis Date    *Atrial fibrillation     History of Cardiac Ablation    Allergy     Anemia     Anticoagulant long-term use     ASA 81 mg    Anxiety     Atherosclerotic heart disease of native coronary artery with angina pectoris     2/17 [facility] Mild, moderate diagonal disease. Small distal LAD. Mild diastolic dysfunction.    Cataract     OU    Chronic pain     Leg, lower back    Colon cancer     Colon polyp     DDD (degenerative disc disease), lumbar     h/o sciatica    Depression     Encounter for blood transfusion     Esophageal stenosis     Fatty liver     Fibromyalgia     Gastritis     GERD (gastroesophageal reflux disease)     hiatal as well    Hiatal hernia     History of psychiatric hospitalization     1997 at [place]    History of renal calculi     Hypersomnia with sleep apnea     uses C-pap    Hypothyroidism 1/28/2013    Hashimoto's, with Hypothyroidism    Irritable bowel syndrome     Meningitis     age 3    Myelodysplastic syndrome     bone marrow cancer    CORWIN (obstructive sleep apnea)     doesn't use her cpap    PVD (posterior vitreous detachment)     OU    Retinal detachment     HST-OS    Rheumatic fever     as a child    Supraventricular tachycardia     Thrombocytopenia      Past Surgical History:   Procedure Laterality  Date    APPENDECTOMY      ATRIAL ABLATION SURGERY      BACK SURGERY      Right Spinal Cord Stimulator    BIOPSY, BONE MARROW N/A 2/22/2012    [facility] Fletcher [facility] at [facility] OR    BONE MARROW BIOPSY      myelodysplastic syndrome    BREAST BIOPSY Left 2016    fibrocystic    CHOLECYSTECTOMY  11/26/2001    Laparascopic    COLON SURGERY  7/2011  ([facility]?  LKVW)     ~2011  ([facility]?  LKVW)    Colon Resection:  Cecum, for polyp with tubulovillous adenocarcinoma.    COLONOSCOPY  6/2015    Dr. rodriguez, repeat in 3 years    COLONOSCOPY  10/22/2018    Dr. Rodriguez: 3 colon polyps removed, redundant colon, diverticulosis, repeat in 3 years for surveillance; biopsy tubular adenomas x 2, 1 hyperplastic polyp    COLONOSCOPY N/A 10/22/2018    Performed by Cooper Rodriguez Jr., MD at [facility] ENDO    COLONOSCOPY N/A 6/19/2015    Performed by Cooper Rodriguez Jr., MD at [facility] ENDO    COLONOSCOPY W/ BIOPSIES AND POLYPECTOMY  5/05/2010  [place]    COLONOSCOPY W/ POLYPECTOMY  6/19/2015  [facility]    Four 1 to 4 mm polyps at the hepatic flexure and distal ascending colon.  -Tubulovillous adenoma.   Diverticulosis in the sigmoid colon.  Patent end-to-end ileo-colonic anastomosis in the proximal/mid ascending colon.   Redundant colon.      Epidural Steroid Injection      Previous injection 17 years ago with Dr. Pang, Pain Management, 1 with Dr. Samuels    NIC-TRANSFORAMINAL L3 and L5 Left 10/14/2015    Performed by Adan Samuels MD at [facility] OR    NIC-TRANSFORAMINAL L4 Left 1/13/2016    Performed by [facility]an [facility]els MD at [facility] OR    ESOPHAGOGASTRODUODENOSCOPY  11/06/2008    GERD & Gastritis    ESOPHAGOGASTRODUODENOSCOPY (EGD) N/A 12/1/2015    Performed by Cooper Rodriguez Jr., MD at [facility] ENDO    EYE SURGERY      Focal Laser OU    HEART CATH-LEFT-RM # 220 B Left 2/18/2017    [facility]    HYSTERECTOMY      ovaries spared    INJECTION, STEROID, SPINE, EPIDURAL, CAUDAL N/A 5/7/2014     Per[facility] by [facility]els [facility] at [facility] OR    INJECTION-FACET L5/S1 Bilateral 7/14/2017    Performed by [facility]an [facility]els MD at [facility] OR    INJECTION-STEROID-EPIDURAL-CAUDAL N/A 4/25/2017    Performed by [facility]els MD at [facility] OR    INJECTION-STEROID-EPIDURAL-CAUDAL N/A 9/10/2015    Performed by [facility] Sam[facility]els MD at [facility] OR    INJECTION-STEROID-EPIDURAL-CAUDAL N/A 6/17/2014    Performed by [facility]els MD at [facility] OR    INSERTION-STIMULATOR-DORSAL COLUMN N/A 10/23/2017    Performed by [facility] Sam[facility]els MD at [facility] OR    Left heart cath Right 12/7/2018    Performed by [facility]re Wheatley [facility] at [facility] CATH    Lumbar Steroid Injection      Pain management    REMOVAL-HARDWARE SPINE N/A 3/16/2016    Performed by [facility]io [facility]rich [facility] at [facility] OR 2ND FLR    SCS REMOVAL  3/16/16    [place]    SPINAL FUSION  1999    L4-L5    SPINE SURGERY  3/16/16    L4-5 PEDICLE SCREW & RODS REMOVED/[place]    TRIAL-STIMULATOR-DORSAL COLUMN N/A 9/18/2017    Performed by [facility]els MD at [facility] OR    TUBAL LIGATION      UPPER GASTROINTESTINAL ENDOSCOPY  12/2015    Dr. Rodriguez    VAGINAL DELIVERY      times 3     Family History   Problem Relation Age of Onset    COPD Mother     Heart disease Mother     Cancer Mother         Breast    Breast cancer Mother 75    Stroke Father     Heart failure Father     Diabetes Father     Diabetes Sister      Wt Readings from Last 10 Encounters:   05/27/19 94 kg (207 lb 3.7 oz)   05/03/19 94.3 kg (207 lb 14.3 oz)   01/15/19 95.2 kg (209 lb 14.1 oz)   12/14/18 92.4 kg (203 lb 9.6 oz)   12/07/18 91.4 kg (201 lb 8 oz)   10/19/18 92.1 kg (203 lb)   07/30/18 94.3 kg (207 lb 12.5 oz)   06/14/18 91.4 kg (201 lb 6.4 oz)   06/04/18 93.1 kg (205 lb 2.2 oz)   05/24/18 92 kg (202 lb 13.2 oz)     Lab Results   Component Value Date    WBC 3.74 (L) 05/03/2019    HGB 9.6 (L) 05/03/2019    HCT 28.2 (L) 05/03/2019     (H) 05/03/2019     (L) 05/03/2019      CMP  Sodium   Date Value Ref Range Status   05/03/2019 141 136 - 145 mmol/L Final     Potassium   Date Value Ref Range Status   05/03/2019 3.8 3.5 - 5.1 mmol/L Final     Chloride   Date Value Ref Range Status   05/03/2019 105 95 - 110 mmol/L Final     CO2   Date Value Ref Range Status   05/03/2019 27 22 - 31 mmol/L Final     Glucose   Date Value Ref Range Status   05/03/2019 131 (H) 70 - 110 mg/dL Final     Comment:     The ADA recommends the following guidelines for fasting glucose:  Normal:       less than 100 mg/dL  Prediabetes:  100 mg/dL to 125 mg/dL  Diabetes:     126 mg/dL or higher       BUN, Bld   Date Value Ref Range Status   05/03/2019 14 7 - 18 mg/dL Final     Creatinine   Date Value Ref Range Status   05/03/2019 0.74 0.50 - 1.40 mg/dL Final     Calcium   Date Value Ref Range Status   05/03/2019 9.4 8.4 - 10.2 mg/dL Final     Total Protein   Date Value Ref Range Status   05/03/2019 6.5 6.0 - 8.4 g/dL Final     Albumin   Date Value Ref Range Status   05/03/2019 4.2 3.5 - 5.2 g/dL Final     Total Bilirubin   Date Value Ref Range Status   05/03/2019 0.5 0.2 - 1.3 mg/dL Final     Alkaline Phosphatase   Date Value Ref Range Status   05/03/2019 97 38 - 145 U/L Final     AST ([place])   Date Value Ref Range Status   11/09/2015 24 14 - 36 U/L Final     AST   Date Value Ref Range Status   05/03/2019 71 (H) 14 - 36 U/L Final     ALT   Date Value Ref Range Status   05/03/2019 44 10 - 44 U/L Final     Anion Gap   Date Value Ref Range Status   05/03/2019 9 8 - 16 mmol/L Final     eGFR if    Date Value Ref Range Status   05/03/2019 >60 >60 mL/min/1.73 m^2 Final     eGFR if non    Date Value Ref Range Status   05/03/2019 >60 >60 mL/min/1.73 m^2 Final     Comment:     Calculation used to obtain the estimated glomerular filtration  rate (eGFR) is the CKD-EPI equation.        Lab Results   Component Value Date    IRON 124 10/27/2011    TIBC 254.0 10/27/2011    FERRITIN 272  "10/27/2011     Lab Results   Component Value Date    TSH 2.553 07/30/2018     Reviewed prior medical records including radiology report of 1/12/11 abdominal ultrasound & endoscopy history (see surgical history).    12/1/15 EGD was reviewed and procedure report states:   " Impression: - Normal oropharynx.  - Benign-appearing esophageal stenosis. Dilated.  - Z-line regular, 39 cm from the incisors.  - Normal esophagus.  - Small hiatus hernia.  - Multiple gastric polyps. Resected and retrieved.  - Gastritis. Biopsied.  - Normal stomach otherwise.  - Normal examined duodenum.  Recommendation: - Discharge patient to home.  - Await pathology results.  - Continue present medications.  - Use Protonix (pantoprazole) 40 mg PO daily.  - Add Zantac (ranitidine) 300 mg PO nightly.  - Follow an antireflux regimen.  - Observe patient's clinical course following   today's procedure with therapeutic intervention.  - Call the G.I. clinic in 2 weeks for reports (if   you haven't heard from us sooner) [phone].  - Return to GI clinic in 6 weeks. ".  Biopsy results:   CLOtest negative  "1. Stomach, antrum, biopsy:  Mild chronic gastritis.  No evidence of Helicobacter species on H&E stain.  2. Stomach, polyps, biopsy:  Benign fundic gland polyps."  Objective:      Physical Exam   Constitutional: She is oriented to person, place, and time. She appears well-developed and well-nourished. No distress.   HENT:   Mouth/Throat: Oropharynx is clear and moist and mucous membranes are normal. No oral lesions. No oropharyngeal exudate.   Eyes: Pupils are equal, round, and reactive to light. Conjunctivae are normal. No scleral icterus.   Pulmonary/Chest: Effort normal and breath sounds normal. No respiratory distress. She has no wheezes.   Abdominal: Soft. Normal appearance and bowel sounds are normal. She exhibits no distension, no abdominal bruit and no mass. There is tenderness (mild) in the right upper quadrant. There is no rigidity, no rebound, " no guarding, no tenderness at McBurney's point and negative Charles's sign.   Well-healed surgical scars noted.   Neurological: She is alert and oriented to person, place, and time.   Skin: Skin is warm and dry. No rash noted. She is not diaphoretic. No erythema. No pallor.   Non-jaundiced   Psychiatric: She has a normal mood and affect. Her behavior is normal. Judgment and thought content normal.   Nursing note and vitals reviewed.      Assessment:       1. Heartburn    2. History of chest pain    3. Anticoagulant long-term use    4. History of anemia    5. Myelodysplastic syndrome    6. Right upper quadrant abdominal tenderness without rebound tenderness    7. Elevated AST (SGOT)    8. S/P cholecystectomy    9. History of colon cancer    10. History of colon resection    11. Chronic constipation        Plan:       Heartburn  - DISCONTINUE PROTONIX DUE TO ALTERNATE THERAPY  -  START   omeprazole (PRILOSEC) 40 MG capsule; Take 1 capsule (40 mg total) by mouth before breakfast.  Dispense: 30 capsule; Refill: 3, - take in the morning 30-60 minutes before breakfast, discussed about possible long term use of medication (prefer to use lowest effective dose or discontinuing if possible), pt verbalized understanding  -discussed about the different types of medications used to treat reflux and how to use them, antacids can be used PRN for breakthrough heartburn symptoms by reducing stomach acid that is already produced, H2 blockers (zantac) work by limiting the amount acid production, & PPI's work to block acid production and are taken daily, patient verbalized understanding.  -Educated patient on lifestyle modifications to help control/reduce reflux/abdominal pain including: avoid large meals, avoid eating within 2-3 hours of bedtime (avoid late night eating & lying down soon after eating), elevate head of bed if nocturnal symptoms are present, smoking cessation (if current smoker), & weight loss (if overweight).    -Educated to avoid known foods which trigger reflux symptoms & to minimize/avoid high-fat foods, chocolate, caffeine, citrus, alcohol, & tomato products.  -Advised to avoid/limit use of NSAID's, since they can cause GI upset, bleeding, and/or ulcers. If needed, take with food.   - schedule EGD, discussed procedure with patient, patient verbalized understanding    History of chest pain  -     US Abdomen Limited (LIVER); Future; Expected date: 05/27/2019  - follow-up with PCP &/or cardiologist for continued evaluation and management  - if experiencing symptoms of headache, chest pain, shortness of breath, and/or blurred vision, recommend going to ER for further evaluation and management    Anticoagulant long-term use  - informed patient that the anticoagulant(s) will likely need to be held for endoscopy, nurse will confirm with cardiologist/PCP.    History of anemia & Myelodysplastic syndrome  Recommend follow-up with HEM/ONC for continued evaluation and management.    Right upper quadrant abdominal tenderness without rebound tenderness  -     US Abdomen Limited (LIVER); Future; Expected date: 05/27/2019  - schedule EGD, discussed procedure with patient, patient verbalized understanding    Elevated AST (SGOT), & S/P cholecystectomy  -     Hepatic function panel; Future; Expected date: 05/27/2019  -     Hepatitis panel, acute; Future; Expected date: 05/27/2019  -     US Abdomen Limited (LIVER); Future; Expected date: 05/27/2019  - discussed with patient that certain medications, such as Lipitor, may be contributing to symptoms. I recommend follow-up with provider who manages medication to discuss about possible alternative therapy, patient verbalized understanding    History of colon cancer & History of colon resection  Recommend follow-up with HEM/ONC & DR. TONEY for continued evaluation and management.  - next surveillance colonoscopy due 10/2021    Chronic constipation  -  START   polyethylene glycol (GLYCOLAX) 17  gram/dose powder; Take 17 g by mouth once daily.  Dispense: 510 g; Refill: 2  Recommend daily exercise as tolerated, adequate water intake (six 8-oz glasses of water daily), and high fiber diet. OTC fiber supplements are recommended if diet does not reach daily fiber goal (20-30 grams daily), such as Metamucil, Citrucel, or FiberCon (take as directed, separate from other oral medications by >2 hours).  - CONTINUE OTC stool softener such as Colace as directed to avoid hard stools and straining with bowel movements PRN  - If no improvement with above recommendations, try intermittently dosed Dulcolax OTC as directed (every 3-4  days) PRN to facilitate bowel movements  -If still no improvement with these measures, call/follow-up  - discussed with patient that a side effect of narcotic pain medications is constipation, advised patient to talk to provider who manages pain medication, patient verbalized understanding    Follow up in about 1 month (around 6/27/2019), or if symptoms worsen or fail to improve.    If no improvement in symptoms or symptoms worsen, call/follow-up at clinic or go to ER.

## 2019-05-28 ENCOUNTER — TELEPHONE (OUTPATIENT)
Dept: GASTROENTEROLOGY | Facility: CLINIC | Age: 69
End: 2019-05-28

## 2019-05-28 DIAGNOSIS — K21.9 GASTROESOPHAGEAL REFLUX DISEASE, ESOPHAGITIS PRESENCE NOT SPECIFIED: ICD-10-CM

## 2019-05-28 DIAGNOSIS — F33.0 MDD (MAJOR DEPRESSIVE DISORDER), RECURRENT EPISODE, MILD: ICD-10-CM

## 2019-05-28 LAB
HAV IGM SERPL QL IA: NEGATIVE
HBV CORE IGM SERPL QL IA: NEGATIVE
HBV SURFACE AG SERPL QL IA: NEGATIVE
HCV AB SERPL QL IA: NEGATIVE

## 2019-05-28 NOTE — TELEPHONE ENCOUNTER
Spoke with pt and informed of results and recommendations per Tamy Finney NP. Pt verbalized understanding.

## 2019-05-28 NOTE — TELEPHONE ENCOUNTER
----- Message from SOULEYMANE Rice sent at 5/28/2019 12:31 PM CDT -----  Please call to inform & review the results with the patient- Lab results are normal.  Continue with previous recommendations.  Thanks,  Ana COMER-C

## 2019-05-29 RX ORDER — PANTOPRAZOLE SODIUM 40 MG/1
TABLET, DELAYED RELEASE ORAL
Qty: 90 TABLET | Refills: 3 | Status: ON HOLD | OUTPATIENT
Start: 2019-05-29 | End: 2019-06-06

## 2019-05-30 RX ORDER — BUPROPION HYDROCHLORIDE 300 MG/1
TABLET ORAL
Qty: 90 TABLET | Refills: 0 | OUTPATIENT
Start: 2019-05-30

## 2019-06-06 ENCOUNTER — HOSPITAL ENCOUNTER (OUTPATIENT)
Facility: HOSPITAL | Age: 69
Discharge: HOME | End: 2019-06-06
Attending: INTERNAL MEDICINE | Admitting: INTERNAL MEDICINE
Payer: MEDICARE

## 2019-06-06 ENCOUNTER — ANESTHESIA (OUTPATIENT)
Dept: ENDOSCOPY | Facility: HOSPITAL | Age: 69
End: 2019-06-06
Payer: MEDICARE

## 2019-06-06 ENCOUNTER — PATIENT MESSAGE (OUTPATIENT)
Dept: GASTROENTEROLOGY | Facility: CLINIC | Age: 69
End: 2019-06-06

## 2019-06-06 ENCOUNTER — ANESTHESIA EVENT (OUTPATIENT)
Dept: ENDOSCOPY | Facility: HOSPITAL | Age: 69
End: 2019-06-06
Payer: MEDICARE

## 2019-06-06 VITALS
SYSTOLIC BLOOD PRESSURE: 116 MMHG | RESPIRATION RATE: 18 BRPM | HEIGHT: 67 IN | DIASTOLIC BLOOD PRESSURE: 63 MMHG | TEMPERATURE: 98 F | HEART RATE: 64 BPM | BODY MASS INDEX: 32.18 KG/M2 | OXYGEN SATURATION: 98 % | WEIGHT: 205 LBS

## 2019-06-06 DIAGNOSIS — K21.9 GERD (GASTROESOPHAGEAL REFLUX DISEASE): ICD-10-CM

## 2019-06-06 LAB — H PYLORI INDEX VALUE: NEGATIVE

## 2019-06-06 PROCEDURE — 25000003 PHARM REV CODE 250: Mod: PO | Performed by: INTERNAL MEDICINE

## 2019-06-06 PROCEDURE — D9220A PRA ANESTHESIA: ICD-10-PCS | Mod: CRNA,,, | Performed by: NURSE ANESTHETIST, CERTIFIED REGISTERED

## 2019-06-06 PROCEDURE — 43248 EGD GUIDE WIRE INSERTION: CPT | Mod: PO | Performed by: INTERNAL MEDICINE

## 2019-06-06 PROCEDURE — 37000008 HC ANESTHESIA 1ST 15 MINUTES: Mod: PO | Performed by: INTERNAL MEDICINE

## 2019-06-06 PROCEDURE — D9220A PRA ANESTHESIA: Mod: CRNA,,, | Performed by: NURSE ANESTHETIST, CERTIFIED REGISTERED

## 2019-06-06 PROCEDURE — D9220A PRA ANESTHESIA: Mod: ANES,,, | Performed by: ANESTHESIOLOGY

## 2019-06-06 PROCEDURE — 63600175 PHARM REV CODE 636 W HCPCS: Mod: PO | Performed by: NURSE ANESTHETIST, CERTIFIED REGISTERED

## 2019-06-06 PROCEDURE — 88305 TISSUE EXAM BY PATHOLOGIST: CPT | Mod: 26,,, | Performed by: PATHOLOGY

## 2019-06-06 PROCEDURE — 27201012 HC FORCEPS, HOT/COLD, DISP: Mod: PO | Performed by: INTERNAL MEDICINE

## 2019-06-06 PROCEDURE — 88305 TISSUE EXAM BY PATHOLOGIST: CPT | Performed by: PATHOLOGY

## 2019-06-06 PROCEDURE — 43239 EGD BIOPSY SINGLE/MULTIPLE: CPT | Mod: 59,,, | Performed by: INTERNAL MEDICINE

## 2019-06-06 PROCEDURE — 87449 NOS EACH ORGANISM AG IA: CPT | Mod: PO | Performed by: INTERNAL MEDICINE

## 2019-06-06 PROCEDURE — 43248 EGD GUIDE WIRE INSERTION: CPT | Mod: ,,, | Performed by: INTERNAL MEDICINE

## 2019-06-06 PROCEDURE — 43239 EGD BIOPSY SINGLE/MULTIPLE: CPT | Mod: PO | Performed by: INTERNAL MEDICINE

## 2019-06-06 PROCEDURE — 43239 PR EGD, FLEX, W/BIOPSY, SGL/MULTI: ICD-10-PCS | Mod: 59,,, | Performed by: INTERNAL MEDICINE

## 2019-06-06 PROCEDURE — 43248 PR EGD, FLEX, W/DILATION OVER GUIDEWIRE: ICD-10-PCS | Mod: ,,, | Performed by: INTERNAL MEDICINE

## 2019-06-06 PROCEDURE — D9220A PRA ANESTHESIA: ICD-10-PCS | Mod: ANES,,, | Performed by: ANESTHESIOLOGY

## 2019-06-06 PROCEDURE — 37000009 HC ANESTHESIA EA ADD 15 MINS: Mod: PO | Performed by: INTERNAL MEDICINE

## 2019-06-06 PROCEDURE — 88305 TISSUE SPECIMEN TO PATHOLOGY - SURGERY: ICD-10-PCS | Mod: 26,,, | Performed by: PATHOLOGY

## 2019-06-06 RX ORDER — SUCRALFATE 1 G/1
1 TABLET ORAL
Qty: 60 TABLET | Refills: 3 | Status: SHIPPED | OUTPATIENT
Start: 2019-06-06 | End: 2019-08-06 | Stop reason: SDUPTHER

## 2019-06-06 RX ORDER — SODIUM CHLORIDE, SODIUM LACTATE, POTASSIUM CHLORIDE, CALCIUM CHLORIDE 600; 310; 30; 20 MG/100ML; MG/100ML; MG/100ML; MG/100ML
INJECTION, SOLUTION INTRAVENOUS CONTINUOUS
Status: DISCONTINUED | OUTPATIENT
Start: 2019-06-06 | End: 2019-06-06 | Stop reason: HOSPADM

## 2019-06-06 RX ORDER — SODIUM CHLORIDE 0.9 % (FLUSH) 0.9 %
10 SYRINGE (ML) INJECTION
Status: DISCONTINUED | OUTPATIENT
Start: 2019-06-06 | End: 2019-06-06 | Stop reason: HOSPADM

## 2019-06-06 RX ORDER — SUCRALFATE 1 G/1
TABLET ORAL
Qty: 360 TABLET | Refills: 3 | OUTPATIENT
Start: 2019-06-06

## 2019-06-06 RX ORDER — PROPOFOL 10 MG/ML
VIAL (ML) INTRAVENOUS
Status: DISCONTINUED | OUTPATIENT
Start: 2019-06-06 | End: 2019-06-06

## 2019-06-06 RX ORDER — LIDOCAINE HCL/PF 100 MG/5ML
SYRINGE (ML) INTRAVENOUS
Status: DISCONTINUED | OUTPATIENT
Start: 2019-06-06 | End: 2019-06-06

## 2019-06-06 RX ORDER — HYOSCYAMINE SULFATE 0.12 MG/1
0.25 TABLET SUBLINGUAL EVERY 4 HOURS PRN
Qty: 30 TABLET | Refills: 11 | Status: SHIPPED | OUTPATIENT
Start: 2019-06-06 | End: 2020-02-10

## 2019-06-06 RX ADMIN — LIDOCAINE HYDROCHLORIDE 75 MG: 20 INJECTION, SOLUTION INTRAVENOUS at 08:06

## 2019-06-06 RX ADMIN — PROPOFOL 30 MG: 10 INJECTION, EMULSION INTRAVENOUS at 08:06

## 2019-06-06 RX ADMIN — PROPOFOL 50 MG: 10 INJECTION, EMULSION INTRAVENOUS at 08:06

## 2019-06-06 RX ADMIN — SODIUM CHLORIDE, SODIUM LACTATE, POTASSIUM CHLORIDE, AND CALCIUM CHLORIDE: .6; .31; .03; .02 INJECTION, SOLUTION INTRAVENOUS at 08:06

## 2019-06-06 NOTE — TRANSFER OF CARE
"Anesthesia Transfer of Care Note    Patient: Charmaine Whalen    Procedure(s) Performed: Procedure(s) (LRB):  EGD (ESOPHAGOGASTRODUODENOSCOPY) (N/A)    Patient location: PACU    Anesthesia Type: general    Transport from OR: Transported from OR on room air with adequate spontaneous ventilation    Post pain: adequate analgesia    Post assessment: no apparent anesthetic complications    Post vital signs: stable    Level of consciousness: awake and sedated    Nausea/Vomiting: no nausea/vomiting    Complications: none    Transfer of care protocol was followed      Last vitals:   Visit Vitals  /63 (BP Location: Left arm, Patient Position: Lying)   Pulse 74   Temp 36.6 °C (97.8 °F) (Skin)   Resp 18   Ht 5' 7" (1.702 m)   Wt 93 kg (205 lb)   SpO2 98%   Breastfeeding? No   BMI 32.11 kg/m²     "

## 2019-06-06 NOTE — BRIEF OP NOTE
Discharge Note  Short Stay      SUMMARY     Admit Date: 6/6/2019    Attending Physician: Cooper Rodriguez Jr., MD     Discharge Physician: Cooper Rodriguez Jr., MD    Discharge Date: 6/6/2019 9:11 AM    Final Diagnosis: Heartburn [R12]  Chest pain, unspecified type [R07.9]  Anemia, unspecified type [D64.9]  Impression:          - Normal oropharynx.                       - Normal esophagus.                       - Z-line regular, 40 cm from the incisors.                       - Small hiatal hernia.                       - Multiple gastric polyps.                       - Gastritis. Biopsied.                       - Normal stomach.                       - Normal examined duodenum.                       - No endoscopic esophageal abnormality to explain                        patient's dysphagia. Esophagus dilated. Dilated.                       - Non-erosive esophageal reflux (NERD) disease                        present.                       - The examination was suspicious for an esophageal                        spasm.  Recommendation:      - Discharge patient to home.                       - Await pathology results.                       - Follow an antireflux regimen.                       - Continue present medications.                       - Use Prilosec (omeprazole) 40 mg PO daily.                       - Use Zantac (ranitidine) 300 mg PO daily.                       - Use sucralfate tablets 1 gram PO QID for 2 weeks.                       - Use Levsin, NuLev (hyoscyamine) 0.125 mg 1-2 tabs                        SL q 4 hours PRN.                       - Call the G.I. clinic in 2 weeks for reports (if                        you haven't heard from us sooner) [phone].                       - Return to GI clinic in 4-6 weeks.                       - If no improvement, next perform a modified barium                        swallow. (And consider for esophageal motility                        study).  Cooper Ford  ARLEEN Rodriguez MD  6/6/2019  Disposition: HOME OR SELF CARE    Patient Instructions:   Current Discharge Medication List      START taking these medications    Details   hyoscyamine (LEVSIN/SL) 0.125 mg Subl Take 2 tablets (0.25 mg total) by mouth every 4 (four) hours as needed. 1 or 2 SL, q 3-4 hrs PRN, esophageal spasm.  Qty: 30 tablet, Refills: 11      sucralfate (CARAFATE) 1 gram tablet Take 1 tablet (1 g total) by mouth 4 (four) times daily before meals and nightly.  Qty: 60 tablet, Refills: 3         CONTINUE these medications which have NOT CHANGED    Details   amLODIPine (NORVASC) 2.5 MG tablet Take 1 tablet (2.5 mg total) by mouth every evening.  Qty: 90 tablet, Refills: 5    Associated Diagnoses: Paroxysmal atrial fibrillation; Non-rheumatic mitral regurgitation; Atherosclerosis of native coronary artery of native heart with angina pectoris      ascorbic acid, vitamin C, (VITAMIN C) 1000 MG tablet Take 1,000 mg by mouth once daily.      aspirin (ECOTRIN) 81 MG EC tablet Take 81 mg by mouth once daily.      atorvastatin (LIPITOR) 80 MG tablet Take 1 tablet (80 mg total) by mouth every evening.  Qty: 90 tablet, Refills: 3      b complex vitamins tablet Take 1 tablet by mouth every morning.       bisacodyl (DULCOLAX) 5 mg EC tablet Take 5 mg by mouth daily as needed for Constipation.      buPROPion (WELLBUTRIN XL) 300 MG 24 hr tablet Take 1 tablet (300 mg total) by mouth every morning.  Qty: 90 tablet, Refills: 1    Associated Diagnoses: MDD (major depressive disorder), recurrent episode, mild      cholecalciferol, vitamin D3, (VITAMIN D) 1,000 unit capsule Take 2,000 Units by mouth every morning.       docusate sodium (STOOL SOFTENER) 100 mg capsule Take 100 mg by mouth 2 (two) times daily.  Refills: 0      FLUoxetine (PROZAC) 40 MG capsule Take 1 capsule (40 mg total) by mouth once daily.  Qty: 90 capsule, Refills: 1    Associated Diagnoses: MDD (major depressive disorder), recurrent episode, mild; SERENE  (generalized anxiety disorder)      gabapentin (NEURONTIN) 300 MG capsule TAKE 2 CAPSULES BY MOUTH TWICE DAILY  Qty: 360 capsule, Refills: 3      levothyroxine (SYNTHROID) 100 MCG tablet TAKE 1 TABLET BY MOUTH EVERY DAY  Qty: 90 tablet, Refills: 0    Associated Diagnoses: Hypothyroidism, unspecified type      nitroGLYCERIN (NITROSTAT) 0.4 MG SL tablet Place 1 tablet (0.4 mg total) under the tongue every 5 (five) minutes as needed for Chest pain.  Qty: 30 tablet, Refills: 12      omeprazole (PRILOSEC) 40 MG capsule Take 1 capsule (40 mg total) by mouth before breakfast.  Qty: 30 capsule, Refills: 3    Associated Diagnoses: Heartburn      polyethylene glycol (GLYCOLAX) 17 gram/dose powder Take 17 g by mouth once daily.  Qty: 510 g, Refills: 2    Associated Diagnoses: Chronic constipation      ranitidine (ZANTAC) 300 MG tablet TAKE 1 TABLET BY MOUTH EVERY NIGHT 30 TO 60 MINUTES BEFORE BEDTIME  Qty: 90 tablet, Refills: 3      traZODone (DESYREL) 100 MG tablet TAKE 1 TO 2 TABLETS BY MOUTH EVERY NIGHT AT BEDTIME FOR SLEEP  Qty: 180 tablet, Refills: 1    Associated Diagnoses: SERENE (generalized anxiety disorder); Other insomnia      oxycodone-acetaminophen (PERCOCET)  mg per tablet Take 1 tablet by mouth every 6 (six) hours as needed for Pain.  Qty: 40 tablet, Refills: 0         STOP taking these medications       pantoprazole (PROTONIX) 40 MG tablet Comments:   Reason for Stopping:               Discharge Procedure Orders (must include Diet, Follow-up, Activity)    Follow Up:  Follow up with PCP as per your routine.  Please follow an anti reflux diet and a high fiber diet.  Activity as tolerated.    No driving day of procedure.

## 2019-06-06 NOTE — ANESTHESIA PREPROCEDURE EVALUATION
06/06/2019  Charmaine Whalen is a 69 y.o., female.    Anesthesia Evaluation    I have reviewed the Patient Summary Reports.    I have reviewed the Nursing Notes.   I have reviewed the Medications.     Review of Systems  Cardiovascular:   CAD   Angina RAMIREZ  Disorder of Cardiac Rhythm, Atrial Fibrillation, Paroxysmal Atrial Fibrillation    Pulmonary:   Shortness of breath  Obstructive Sleep Apnea (CORWIN), CPAP used.   Hepatic/GI:   Hiatal Hernia, GERD    Musculoskeletal:   Arthritis     Neurological:   Neuromuscular Disease,    Endocrine:   Hypothyroidism    Psych:   Psychiatric History          Physical Exam  General:  Obesity    Airway/Jaw/Neck:  Airway Findings: Mouth Opening: Normal Tongue: Normal  General Airway Assessment: Adult  Mallampati: III                 Anesthesia Plan  Type of Anesthesia, risks & benefits discussed:  Anesthesia Type:  general  Patient's Preference:   Intra-op Monitoring Plan: standard ASA monitors  Intra-op Monitoring Plan Comments:   Post Op Pain Control Plan:   Post Op Pain Control Plan Comments:   Induction:   IV  Beta Blocker:  Patient is not currently on a Beta-Blocker (No further documentation required).       Informed Consent: Patient understands risks and agrees with Anesthesia plan.  Questions answered.   ASA Score: 3     Day of Surgery Review of History & Physical:            Ready For Surgery From Anesthesia Perspective.

## 2019-06-06 NOTE — H&P
History & Physical - Short Stay  Gastroenterology      SUBJECTIVE:     Procedure: Gastroscopy    Chief Complaint/Indication for Procedure: Atypical chest pain.  GERD.    History of Present Illness:  Office Visit     5/27/2019  [place] - Gastroenterology      [facility]   Gastroenterology   Heartburn +10 more   Dx   Gastroesophageal Reflux     Reason for Visit       Progress Notes        Subjective:       Patient ID: Charmaine Whalen is a 69 y.o.  female Body mass index is 32.46 kg/m².     Chief Complaint: Gastroesophageal Reflux (ER f/u )     Established patient of Dr. Rodriguez & myself.     Patient is here for follow-up from ER visit for chest pain on 5/3/19 (visit note reviewed).     Gastroesophageal Reflux   She complains of belching (frequent), chest pain (recurred on 5/3/19 (had 2 prior episodes),no relief with tums; relieved with nitro; seeing Dr. Alcantar who thinks she may have esophageal spasms, denies any chest pain since then), globus sensation, heartburn (a few times a week) and nausea (occasional). She reports no abdominal pain, no choking, no coughing, no dysphagia, no hoarse voice, no sore throat or no water brash. This is a chronic (for several years) problem. Nothing aggravates the symptoms. Associated symptoms include anemia. Pertinent negatives include no fatigue, melena or weight loss (on portion control diet since 4/2019; no weight loss yet). Risk factors include obesity, hiatal hernia and caffeine use. She has tried a PPI and a histamine-2 antagonist (protonix 40 mg once daily in the AM and zantac 300 mg once daily in PM, PAST: probiotics, nexium worked but stopped due to cost) for the symptoms. The treatment provided significant relief. Past procedures include an EGD.     Cardiovascular: Positive for chest pain (recurred on 5/3/19 (had 2 prior episodes),no relief with tums; relieved with nitro; seeing Dr. Alcantar who thinks she may have esophageal spasms, denies any chest pain  "since then).   Gastrointestinal: Positive for constipation (chronic, bowel movements once a week hard stools, denies straining; takes colace 100 mg daily, dulcolax prn- about once a week), heartburn (a few times a week) and nausea (occasional). Negative for abdominal distention, abdominal pain, anal bleeding, blood in stool, diarrhea, dysphagia, melena, rectal pain and vomiting.    12/1/15 EGD was reviewed and procedure report states:   " Impression: - Normal oropharynx.  - Benign-appearing esophageal stenosis. Dilated.  - Z-line regular, 39 cm from the incisors.  - Normal esophagus.  - Small hiatus hernia.  - Multiple gastric polyps. Resected and retrieved.  - Gastritis. Biopsied.  - Normal stomach otherwise.  - Normal examined duodenum.  Recommendation: - Discharge patient to home.  - Await pathology results.  - Continue present medications.  - Use Protonix (pantoprazole) 40 mg PO daily.  - Add Zantac (ranitidine) 300 mg PO nightly.  - Follow an antireflux regimen.  - Observe patient's clinical course following   today's procedure with therapeutic intervention.  - Call the G.I. clinic in 2 weeks for reports (if   you haven't heard from us sooner) [phone].  - Return to GI clinic in 6 weeks. ".  Biopsy results:   CLOtest negative  "1. Stomach, antrum, biopsy:  Mild chronic gastritis.  No evidence of Helicobacter species on H&E stain.  2. Stomach, polyps, biopsy:  Benign fundic gland polyps."    Assessment:       1. Heartburn    2. History of chest pain    3. Anticoagulant long-term use    4. History of anemia    5. Myelodysplastic syndrome    6. Right upper quadrant abdominal tenderness without rebound tenderness    7. Elevated AST (SGOT)    8. S/P cholecystectomy    9. History of colon cancer    10. History of colon resection    11. Chronic constipation        Plan:       Heartburn  - DISCONTINUE PROTONIX DUE TO ALTERNATE THERAPY  -  START   omeprazole (PRILOSEC) 40 MG capsule; Take 1 capsule (40 mg total) by " mouth before breakfast.  Dispense: 30 capsule; Refill: 3, - take in the morning 30-60 minutes before breakfast, discussed about possible long term use of medication (prefer to use lowest effective dose or discontinuing if possible), pt verbalized understanding  -discussed about the different types of medications used to treat reflux and how to use them, antacids can be used PRN for breakthrough heartburn symptoms by reducing stomach acid that is already produced, H2 blockers (zantac) work by limiting the amount acid production, & PPI's work to block acid production and are taken daily, patient verbalized understanding.  -Educated patient on lifestyle modifications to help control/reduce reflux/abdominal pain including: avoid large meals, avoid eating within 2-3 hours of bedtime (avoid late night eating & lying down soon after eating), elevate head of bed if nocturnal symptoms are present, smoking cessation (if current smoker), & weight loss (if overweight).   -Educated to avoid known foods which trigger reflux symptoms & to minimize/avoid high-fat foods, chocolate, caffeine, citrus, alcohol, & tomato products.  -Advised to avoid/limit use of NSAID's, since they can cause GI upset, bleeding, and/or ulcers. If needed, take with food.   - schedule EGD, discussed procedure with patient, patient verbalized understanding     History of chest pain  -     US Abdomen Limited (LIVER); Future; Expected date: 05/27/2019  - follow-up with PCP &/or cardiologist for continued evaluation and management  - if experiencing symptoms of headache, chest pain, shortness of breath, and/or blurred vision, recommend going to ER for further evaluation and management     Anticoagulant long-term use  - informed patient that the anticoagulant(s) will likely need to be held for endoscopy, nurse will confirm with cardiologist/PCP.     History of anemia & Myelodysplastic syndrome  Recommend follow-up with HEM/ONC for continued evaluation and  management.     Right upper quadrant abdominal tenderness without rebound tenderness  -     US Abdomen Limited (LIVER); Future; Expected date: 05/27/2019  - schedule EGD, discussed procedure with patient, patient verbalized understanding     Elevated AST (SGOT), & S/P cholecystectomy  -     Hepatic function panel; Future; Expected date: 05/27/2019  -     Hepatitis panel, acute; Future; Expected date: 05/27/2019  -     US Abdomen Limited (LIVER); Future; Expected date: 05/27/2019  - discussed with patient that certain medications, such as Lipitor, may be contributing to symptoms. I recommend follow-up with provider who manages medication to discuss about possible alternative therapy, patient verbalized understanding     History of colon cancer & History of colon resection  Recommend follow-up with HEM/ONC & DR. TONEY for continued evaluation and management.  - next surveillance colonoscopy due 10/2021     Chronic constipation  -  START   polyethylene glycol (GLYCOLAX) 17 gram/dose powder; Take 17 g by mouth once daily.  Dispense: 510 g; Refill: 2  Recommend daily exercise as tolerated, adequate water intake (six 8-oz glasses of water daily), and high fiber diet. OTC fiber supplements are recommended if diet does not reach daily fiber goal (20-30 grams daily), such as Metamucil, Citrucel, or FiberCon (take as directed, separate from other oral medications by >2 hours).  - CONTINUE OTC stool softener such as Colace as directed to avoid hard stools and straining with bowel movements PRN  - If no improvement with above recommendations, try intermittently dosed Dulcolax OTC as directed (every 3-4  days) PRN to facilitate bowel movements  -If still no improvement with these measures, call/follow-up  - discussed with patient that a side effect of narcotic pain medications is constipation, advised patient to talk to provider who manages pain medication, patient verbalized understanding     Follow up in about 1 month (around  6/27/2019), or if symptoms worsen or fail to improve.              PTA Medications   Medication Sig    amLODIPine (NORVASC) 2.5 MG tablet Take 1 tablet (2.5 mg total) by mouth every evening.    ascorbic acid, vitamin C, (VITAMIN C) 1000 MG tablet Take 1,000 mg by mouth once daily.    aspirin (ECOTRIN) 81 MG EC tablet Take 81 mg by mouth once daily.    atorvastatin (LIPITOR) 80 MG tablet Take 1 tablet (80 mg total) by mouth every evening.    b complex vitamins tablet Take 1 tablet by mouth every morning.     bisacodyl (DULCOLAX) 5 mg EC tablet Take 5 mg by mouth daily as needed for Constipation.    buPROPion (WELLBUTRIN XL) 300 MG 24 hr tablet Take 1 tablet (300 mg total) by mouth every morning.    cholecalciferol, vitamin D3, (VITAMIN D) 1,000 unit capsule Take 2,000 Units by mouth every morning.     docusate sodium (STOOL SOFTENER) 100 mg capsule Take 100 mg by mouth 2 (two) times daily. (Patient taking differently: Take 100 mg by mouth every evening. )    FLUoxetine (PROZAC) 40 MG capsule Take 1 capsule (40 mg total) by mouth once daily.    gabapentin (NEURONTIN) 300 MG capsule TAKE 2 CAPSULES BY MOUTH TWICE DAILY    levothyroxine (SYNTHROID) 100 MCG tablet TAKE 1 TABLET BY MOUTH EVERY DAY    nitroGLYCERIN (NITROSTAT) 0.4 MG SL tablet Place 1 tablet (0.4 mg total) under the tongue every 5 (five) minutes as needed for Chest pain.    omeprazole (PRILOSEC) 40 MG capsule Take 1 capsule (40 mg total) by mouth before breakfast.    polyethylene glycol (GLYCOLAX) 17 gram/dose powder Take 17 g by mouth once daily.    ranitidine (ZANTAC) 300 MG tablet TAKE 1 TABLET BY MOUTH EVERY NIGHT 30 TO 60 MINUTES BEFORE BEDTIME    traZODone (DESYREL) 100 MG tablet TAKE 1 TO 2 TABLETS BY MOUTH EVERY NIGHT AT BEDTIME FOR SLEEP    oxycodone-acetaminophen (PERCOCET)  mg per tablet Take 1 tablet by mouth every 6 (six) hours as needed for Pain.    [DISCONTINUED] pantoprazole (PROTONIX) 40 MG tablet TAKE 1 TABLET BY  MOUTH EVERY DAY       Review of patient's allergies indicates:   Allergen Reactions    Sulfa (sulfonamide antibiotics) Hives     Other reaction(s): Hives        Past Medical History:   Diagnosis Date    *Atrial fibrillation     History of Cardiac Ablation    Abnormal stress test     Allergy     Anemia     Anticoagulant long-term use     ASA 81 mg    Anxiety     Atherosclerotic heart disease of native coronary artery with angina pectoris     2/17 [facility] Mild, moderate diagonal disease. Small distal LAD. Mild diastolic dysfunction.    Cataract     OU    Cholelithiasis     Chronic pain     Leg, lower back    Colon cancer     Colon polyp     DDD (degenerative disc disease), lumbar     h/o sciatica    Depression     Encounter for blood transfusion     Esophageal stenosis     Fatty liver     Fibromyalgia     Gastritis     GERD (gastroesophageal reflux disease)     hiatal as well    Hiatal hernia     History of psychiatric hospitalization     1997 at [place]    History of renal calculi     Hypersomnia with sleep apnea     uses C-pap    Hypothyroidism 1/28/2013    Hashimoto's, with Hypothyroidism    Irritable bowel syndrome     Meningitis     age 3    Myelodysplastic syndrome     bone marrow cancer    CORWIN (obstructive sleep apnea)     doesn't use her cpap    PVD (posterior vitreous detachment)     OU    Retinal detachment     HST-OS    Rheumatic fever     as a child    Supraventricular tachycardia     Thrombocytopenia      Past Surgical History:   Procedure Laterality Date    APPENDECTOMY      ATRIAL ABLATION SURGERY      BACK SURGERY      Right Spinal Cord Stimulator    BIOPSY, BONE MARROW N/A 2/22/2012    [facility]    BONE MARROW BIOPSY      myelodysplastic syndrome    BREAST BIOPSY Left 2016    fibrocystic    CHOLECYSTECTOMY  11/26/2001    Laparascopic    COLON SURGERY  7/2011  ([facility]?  LKVW)     ~2011  ([facility]?  LKVW)    Colon Resection:   Cecum, for polyp with tubulovillous adenocarcinoma.    COLONOSCOPY  6/2015    Dr. rodriguez, repeat in 3 years    COLONOSCOPY  10/22/2018    Dr. Rodriguez: 3 colon polyps removed, redundant colon, diverticulosis, repeat in 3 years for surveillance; biopsy tubular adenomas x 2, 1 hyperplastic polyp    COLONOSCOPY N/A 10/22/2018    Performed by Cooper Rodriguez Jr[facility] at [facility] ENDO    COLONOSCOPY N/A 6/19/2015    Performed by Cooper Rodriguez Jr[facility] MD at [facility] ENDO    COLONOSCOPY W/ BIOPSIES AND POLYPECTOMY  5/05/2010  [place]    COLONOSCOPY W/ POLYPECTOMY  6/19/2015  [facility]    Four 1 to 4 mm polyps at the hepatic flexure and distal ascending colon.  -Tubulovillous adenoma.   Diverticulosis in the sigmoid colon.  Patent end-to-end ileo-colonic anastomosis in the proximal/mid ascending colon.   Redundant colon.      Epidural Steroid Injection      Previous injection 17 years ago with Dr. Pang, Pain Management, 1 with Dr. Samuels    NIC-TRANSFORAMINAL L3 and L5 Left 10/14/2015    [facility]an [facility]els [facility] at [facility] OR    NIC-TRANSFORAMINAL L4 Left 1/13/2016    [facility] by [facility]els [facility] at [facility] OR    ESOPHAGOGASTRODUODENOSCOPY  11/06/2008    GERD & Gastritis    ESOPHAGOGASTRODUODENOSCOPY (EGD) N/A 12/1/2015    Performed by Cooper Rodriguez Jr., MD at [facility] ENDO    EYE SURGERY      Focal Laser OU    HEART CATH-LEFT-RM # 220 B Left 2/18/2017    [facility]ATH    HYSTERECTOMY      ovaries spared    INJECTION, STEROID, SPINE, EPIDURAL, CAUDAL N/A 5/7/2014    Performed by [facility]uels [facility] at [facility] OR    INJECTION-FACET L5/S1 Bilateral 7/14/2017    [facility] by [facility]els [facility] at [facility] OR    INJECTION-STEROID-EPIDURAL-CAUDAL N/A 4/25/2017    Per[facility] by [facility]els [facility] at [facility] OR    INJECTION-STEROID-EPIDURAL-CAUDAL N/A 9/10/2015    [facility]els [facility] at [facility] OR    INJECTION-STEROID-EPIDURAL-CAUDAL N/A 6/17/2014    Performed by Adan  "CHRISTIE Sam[facility]els MD at [facility] OR    INSERTION-STIMULATOR-DORSAL COLUMN N/A 10/23/2017    [facility] at [facility] OR    Left heart cath Right 12/7/2018    [facility] at [facility] CATH    Lumbar Steroid Injection      Pain management    REMOVAL-HARDWARE SPINE N/A 3/16/2016    Per[facility] by [facility] at [facility] OR 2ND FLR    SCS REMOVAL  3/16/16    [place]    SPINAL FUSION  1999    L4-L5    SPINE SURGERY  3/16/16    L4-5 PEDICLE SCREW & RODS REMOVED/[place]    TRIAL-STIMULATOR-DORSAL COLUMN N/A 9/18/2017    [facility] at [facility] OR    TUBAL LIGATION      UPPER GASTROINTESTINAL ENDOSCOPY  12/2015    Dr. Rodriguez    VAGINAL DELIVERY      times 3     Family History   Problem Relation Age of Onset    COPD Mother     Heart disease Mother     Cancer Mother         Breast    Breast cancer Mother 75    Stroke Father     Heart failure Father     Diabetes Father     Diabetes Sister     Crohn's disease Other     Colon cancer Neg Hx     Ulcerative colitis Neg Hx     Stomach cancer Neg Hx     Esophageal cancer Neg Hx      Social History     Tobacco Use    Smoking status: Never Smoker    Smokeless tobacco: Never Used   Substance Use Topics    Alcohol use: No    Drug use: No         OBJECTIVE:     Vital Signs (Most Recent)  Temp: 98.2 °F (36.8 °C) (06/06/19 0806)  Pulse: 64 (06/06/19 0806)  Resp: 19 (06/06/19 0806)  BP: 116/63 (06/06/19 0806)  SpO2: 97 % (06/06/19 0806)    Physical Exam:  :Ht 5' 7" (1.702 m)   Wt 94 kg (207 lb 3.7 oz)   BMI 32.46 kg/m²     GENERAL:  Comfortable, in no acute distress.                              HEENT EXAM:  Nonicteric.  No adenopathy.  Oropharynx is clear.               NECK:  Supple.                                                               LUNGS:  Clear.                                                               CARDIAC:  Regular rate and rhythm.  S1, S2.  No murmur.          ABDOMEN:  Soft, " positive bowel sounds, nontender.  No hepatosplenomegaly or masses.  No rebound or guarding.           EXTREMITIES:  No edema.     MENTAL STATUS:  Alert and oriented.    ASSESSMENT/PLAN:     Assessment: Atypical chest pain.  GERD.    Plan: Gastroscopy    Anesthesia Plan:   MAC / General Anaesthesia    ASA Grade: ASA 2 - Patient with mild systemic disease with no functional limitations    MALLAMPATI SCORE: II (hard and soft palate, upper portion of tonsils anduvula visible)

## 2019-06-06 NOTE — [employer] PATIENT INSTRUCTIONS
Discharge Summary/Instructions after an Endoscopic Procedure  Patient Name: Charmaine Whalen  Patient MRN: [id number]  Patient YOB: 1950  Thursday, June 06, 2019  Cooper Rodriguez MD  RESTRICTIONS:  During your procedure today, you received medications for sedation.  These   medications may affect your judgment, balance and coordination.  Therefore,   for 24 hours, you have the following restrictions:   - DO NOT drive a car, operate machinery, make legal/financial decisions,   sign important papers or drink alcohol.    ACTIVITY:  Today: no heavy lifting, straining or running due to procedural   sedation/anesthesia.  The following day: return to full activity including work.  DIET:  Eat and drink normally unless instructed otherwise.     TREATMENT FOR COMMON SIDE EFFECTS:  - Mild abdominal pain, nausea, belching, bloating or excessive gas:  rest,   eat lightly and use a heating pad.  - Sore Throat: treat with throat lozenges and/or gargle with warm salt   water.  - Because air was used during the procedure, expelling large amounts of air   from your rectum or belching is normal.  - If a bowel prep was taken, you may not have a bowel movement for 1-3 days.    This is normal.  SYMPTOMS TO WATCH FOR AND REPORT TO YOUR PHYSICIAN:  1. Abdominal pain or bloating, other than gas cramps.  2. Chest pain.  3. Back pain.  4. Signs of infection such as: chills or fever occurring within 24 hours   after the procedure.  5. Rectal bleeding, which would show as bright red, maroon, or black stools.   (A tablespoon of blood from the rectum is not serious, especially if   hemorrhoids are present.)  6. Vomiting.  7. Weakness or dizziness.  GO DIRECTLY TO THE NEAREST EMERGENCY ROOM IF YOU HAVE ANY OF THE FOLLOWING:      Difficulty breathing              Chills and/or fever over 101 F   Persistent vomiting and/or vomiting blood   Severe abdominal pain   Severe chest pain   Black, tarry stools   Bleeding- more than one  tablespoon   Any other symptom or condition that you feel may need urgent attention  Your doctor recommends these additional instructions:  If any biopsies were taken, your doctors clinic will contact you in 1 to 2   weeks with any results.  Follow an antireflux regimen.  This includes:       - Do not lie down for at least 3 to 4 hours after meals.        - Raise the head of the bed 4 to 6 inches.        - Decrease excess weight.        - Avoid citrus juices and other acidic foods, alcohol, chocolate, mints,   coffee and other caffeinated beverages, carbonated beverages, fatty and   fried foods.        - Avoid tight-fitting clothing.        - Avoid cigarettes and other tobacco products.   Continue your present medications.   Take Prilosec (omeprazole) 40 mg by mouth once a day.   Take Zantac (ranitidine) 300 mg by mouth once a day.   Add Carafate (sucralfate) tablets 1 gram by mouth four times a day for two   weeks.   Take Levsin, NuLev (hyoscyamine) 0.125 mg 1-2 tablets under your tongue   every four hours as needed, as an anti-spasmotic.   Return to your GI clinic in 4-6 weeks.   For questions, problems or results please call your physician - Cooper Rodriguez MD at Work:  [phone].  EMERGENCY PHONE NUMBER: [phone], LAB RESULTS: [phone]  IF A COMPLICATION OR EMERGENCY SITUATION ARISES AND YOU ARE UNABLE TO REACH   YOUR PHYSICIAN - GO DIRECTLY TO THE EMERGENCY ROOM.  ___________________________________________  Nurse Signature  ___________________________________________  Patient/Designated Responsible Party Signature  Cooper Rodriguez MD  6/6/2019 9:08:08 AM  This report has been verified and signed electronically.  [employer]

## 2019-06-06 NOTE — ANESTHESIA POSTPROCEDURE EVALUATION
Anesthesia Post Evaluation    Patient: Charmaine Whalen    Procedure(s) Performed: Procedure(s) (LRB):  EGD (ESOPHAGOGASTRODUODENOSCOPY) (N/A)    Final Anesthesia Type: general  Patient location during evaluation: PACU  Patient participation: Yes- Able to Participate  Level of consciousness: awake and alert, oriented and awake  Post-procedure vital signs: reviewed and stable  Pain management: adequate  Airway patency: patent  PONV status at discharge: No PONV  Anesthetic complications: no      Cardiovascular status: blood pressure returned to baseline and hemodynamically stable  Respiratory status: unassisted, spontaneous ventilation and room air  Hydration status: euvolemic  Follow-up not needed.          Vitals Value Taken Time   /63 6/6/2019  9:20 AM   Temp 36.6 °C (97.8 °F) 6/6/2019  8:47 AM   Pulse 64 6/6/2019  9:20 AM   Resp 18 6/6/2019  9:20 AM   SpO2 98 % 6/6/2019  9:20 AM         Event Time     Out of Recovery 09:32:36          Pain/Ray Score: Ray Score: 10 (6/6/2019  9:20 AM)

## 2019-06-06 NOTE — DISCHARGE INSTRUCTIONS
Recovery After Procedural Sedation (Adult)  You have been given medicine by vein to make you sleep during your surgery. This may have included both a pain medicine and sleeping medicine. Most of the effects have worn off. But you may still have some drowsiness for the next 6 to 8 hours.  Home care  Follow these guidelines when you get home:  · For the next 8 hours, you should be watched by a responsible adult. This person should make sure your condition is not getting worse.  · Don't drink any alcohol for the next 24 hours.  · Don't drive, operate dangerous machinery, or make important business or personal decisions during the next 24 hours.  Note: Your healthcare provider may tell you not to take any medicine by mouth for pain or sleep in the next 4 hours. These medicines may react with the medicines you were given in the hospital. This could cause a much stronger response than usual.  Follow-up care  Follow up with your healthcare provider if you are not alert and back to your usual level of activity within 12 hours.  When to seek medical advice  Call your healthcare provider right away if any of these occur:  · Drowsiness gets worse  · Weakness or dizziness gets worse  · Repeated vomiting  · You can't be awakened   Date Last Reviewed: 10/18/2016  © 5497-2530 [employer]. [address] [place] [address], [place] [address]. All rights reserved. This information is not intended as a substitute for professional medical care. Always follow your healthcare professional's instructions.      Tips to Control Acid Reflux    To control acid reflux, youll need to make some basic diet and lifestyle changes. The simple steps outlined below may be all youll need to ease discomfort.  Watch what you eat  · Avoid fatty foods and spicy foods.  · Eat fewer acidic foods, such as citrus and tomato-based foods. These can increase symptoms.  · Limit drinking alcohol, caffeine, and fizzy beverages. All increase acid  reflux.  · Try limiting chocolate, peppermint, and spearmint. These can worsen acid reflux in some people.  Watch when you eat  · Avoid lying down for 3 hours after eating.  · Do not snack before going to bed.  Raise your head  Raising your head and upper body by 4 to 6 inches helps limit reflux when youre lying down. Put blocks under the head of your bed frame to raise it.  Other changes  · Lose weight, if you need to  · Dont exercise near bedtime  · Avoid tight-fitting clothes  · Limit aspirin and ibuprofen  · Stop smoking   Date Last Reviewed: 7/1/2016 © 2000-2017 [employer]. [address] [place] [address], [place] [address]. All rights reserved. This information is not intended as a substitute for professional medical care. Always follow your healthcare professional's instructions.        High-Fiber Diet  Fiber is in fruits, vegetables, cereals, and grains. Fiber passes through your body undigested. A high-fiber diet helps food move through your intestinal tract. The added bulk is helpful in preventing constipation. In people with diverticulosis, fiber helps clean out the pouches along the colon wall. It also prevents new pouches from forming. A high-fiber diet reduces the risk of colon cancer. It also lowers blood cholesterol and prevents high blood sugar in people with diabetes.    The fiber-rich foods listed below should be part of your diet. If you are not used to high-fiber foods, start with 1 or 2 foods from this list. Every 3 to 4 days add a new one to your diet. Do this until you are eating 4 high-fiber foods per day. This should give you 20 to 35 grams of fiber a day. It is also important to drink a lot of water when you are on this diet. You should have 6 to 8 glasses of water a day. Water makes the fiber swell and increases the benefit.  Foods high in dietary fiber  The following foods are high in dietary fiber:  · Breads. Breads made with 100% whole-wheat flour; hayder, wheat, or rye  crackers; whole-grain tortillas, bran muffins.  · Cereals. Whole-grain and bran cereals with bran (shredded wheat, wheat flakes, raisin bran, corn bran); oatmeal, rolled oats, granola, and brown rice.  · Fruits. Fresh fruits and their edible skins (pears, prunes, raisins, berries, apples, and apricots); bananas, citrus fruit, mangoes, pineapple; and prune juice.  · Nuts. Any nuts and seeds.  · Vegetables. Best served raw or lightly cooked. All types, especially: green peas, celery, eggplant, potatoes, spinach, broccoli, [place] sprouts, winter squash, carrots, cauliflower, soybeans, lentils, and fresh and dried beans of all kinds.  · Other. Popcorn, any spices.  Date Last Reviewed: 8/1/2016  © 3473-7719 [employer]. [address] [place] [address], [place] [address]. All rights reserved. This information is not intended as a substitute for professional medical care. Always follow your healthcare professional's instructions.

## 2019-06-07 ENCOUNTER — LAB VISIT (OUTPATIENT)
Dept: LAB | Facility: HOSPITAL | Age: 69
End: 2019-06-07
Attending: INTERNAL MEDICINE
Payer: MEDICARE

## 2019-06-07 DIAGNOSIS — D46.9 MDS (MYELODYSPLASTIC SYNDROME): ICD-10-CM

## 2019-06-07 LAB
ALBUMIN SERPL BCP-MCNC: 4.2 G/DL (ref 3.5–5.2)
ALP SERPL-CCNC: 87 U/L (ref 38–145)
ALT SERPL W/O P-5'-P-CCNC: 43 U/L (ref 10–44)
ANION GAP SERPL CALC-SCNC: 7 MMOL/L (ref 8–16)
AST SERPL-CCNC: 31 U/L (ref 14–36)
BASOPHILS # BLD AUTO: 0.02 K/UL (ref 0–0.2)
BASOPHILS NFR BLD: 0.6 % (ref 0–1.9)
BILIRUB SERPL-MCNC: 0.4 MG/DL (ref 0.2–1.3)
BUN SERPL-MCNC: 15 MG/DL (ref 7–18)
CALCIUM SERPL-MCNC: 8.9 MG/DL (ref 8.4–10.2)
CHLORIDE SERPL-SCNC: 108 MMOL/L (ref 95–110)
CO2 SERPL-SCNC: 28 MMOL/L (ref 22–31)
CREAT SERPL-MCNC: 0.73 MG/DL (ref 0.5–1.4)
DIFFERENTIAL METHOD: ABNORMAL
EOSINOPHIL # BLD AUTO: 0 K/UL (ref 0–0.5)
EOSINOPHIL NFR BLD: 1.2 % (ref 0–8)
ERYTHROCYTE [DISTWIDTH] IN BLOOD BY AUTOMATED COUNT: 13.7 % (ref 11.5–14.5)
EST. GFR  (AFRICAN AMERICAN): >60 ML/MIN/1.73 M^2
EST. GFR  (NON AFRICAN AMERICAN): >60 ML/MIN/1.73 M^2
GLUCOSE SERPL-MCNC: 108 MG/DL (ref 70–110)
HCT VFR BLD AUTO: 29.5 % (ref 37–48.5)
HGB BLD-MCNC: 9.9 G/DL (ref 12–16)
IMM GRANULOCYTES # BLD AUTO: 0.01 K/UL (ref 0–0.04)
IMM GRANULOCYTES NFR BLD AUTO: 0.3 % (ref 0–0.5)
LDH SERPL L TO P-CCNC: 490 U/L (ref 313–618)
LYMPHOCYTES # BLD AUTO: 1.2 K/UL (ref 1–4.8)
LYMPHOCYTES NFR BLD: 35.7 % (ref 18–48)
MCH RBC QN AUTO: 39 PG (ref 27–31)
MCHC RBC AUTO-ENTMCNC: 33.6 G/DL (ref 32–36)
MCV RBC AUTO: 116 FL (ref 82–98)
MONOCYTES # BLD AUTO: 0.3 K/UL (ref 0.3–1)
MONOCYTES NFR BLD: 8 % (ref 4–15)
NEUTROPHILS # BLD AUTO: 1.8 K/UL (ref 1.8–7.7)
NEUTROPHILS NFR BLD: 54.2 % (ref 38–73)
NRBC BLD-RTO: 0 /100 WBC
PLATELET # BLD AUTO: 116 K/UL (ref 150–350)
PMV BLD AUTO: 10.6 FL (ref 9.2–12.9)
POTASSIUM SERPL-SCNC: 4.4 MMOL/L (ref 3.5–5.1)
PROT SERPL-MCNC: 6.6 G/DL (ref 6–8.4)
RBC # BLD AUTO: 2.54 M/UL (ref 4–5.4)
SODIUM SERPL-SCNC: 143 MMOL/L (ref 136–145)
WBC # BLD AUTO: 3.39 K/UL (ref 3.9–12.7)

## 2019-06-07 PROCEDURE — 80053 COMPREHEN METABOLIC PANEL: CPT

## 2019-06-07 PROCEDURE — 83615 LACTATE (LD) (LDH) ENZYME: CPT | Mod: PN

## 2019-06-07 PROCEDURE — 85025 COMPLETE CBC W/AUTO DIFF WBC: CPT

## 2019-06-07 PROCEDURE — 80053 COMPREHEN METABOLIC PANEL: CPT | Mod: PN

## 2019-06-07 PROCEDURE — 85025 COMPLETE CBC W/AUTO DIFF WBC: CPT | Mod: PN

## 2019-06-07 PROCEDURE — 83615 LACTATE (LD) (LDH) ENZYME: CPT

## 2019-06-07 PROCEDURE — 36415 COLL VENOUS BLD VENIPUNCTURE: CPT | Mod: PN

## 2019-06-10 ENCOUNTER — HOSPITAL ENCOUNTER (OUTPATIENT)
Dept: RADIOLOGY | Facility: HOSPITAL | Age: 69
Discharge: HOME | End: 2019-06-10
Attending: NURSE PRACTITIONER
Payer: MEDICARE

## 2019-06-10 DIAGNOSIS — R10.811 RIGHT UPPER QUADRANT ABDOMINAL TENDERNESS WITHOUT REBOUND TENDERNESS: ICD-10-CM

## 2019-06-10 DIAGNOSIS — Z90.49 S/P CHOLECYSTECTOMY: ICD-10-CM

## 2019-06-10 DIAGNOSIS — R74.01 ELEVATED AST (SGOT): ICD-10-CM

## 2019-06-10 DIAGNOSIS — Z87.898 HISTORY OF CHEST PAIN: ICD-10-CM

## 2019-06-10 PROCEDURE — 76705 US ABDOMEN LIMITED: ICD-10-PCS | Mod: 26,,, | Performed by: RADIOLOGY

## 2019-06-10 PROCEDURE — 76705 ECHO EXAM OF ABDOMEN: CPT | Mod: 26,,, | Performed by: RADIOLOGY

## 2019-06-10 PROCEDURE — 76705 ECHO EXAM OF ABDOMEN: CPT | Mod: TC,PO

## 2019-06-11 ENCOUNTER — TELEPHONE (OUTPATIENT)
Dept: GASTROENTEROLOGY | Facility: CLINIC | Age: 69
End: 2019-06-11

## 2019-06-11 NOTE — TELEPHONE ENCOUNTER
----- Message from SOULEYMANE Rice sent at 6/10/2019  4:44 PM CDT -----  Please call to inform & review the results with the patient- The radiology report of the abdominal ultrasound showed unremarkable findings.   Continue with previous recommendations.  Thanks,  Ana COMER-C

## 2019-06-21 ENCOUNTER — PATIENT MESSAGE (OUTPATIENT)
Dept: ADMINISTRATIVE | Facility: OTHER | Age: 69
End: 2019-06-21

## 2019-06-24 ENCOUNTER — INFUSION (OUTPATIENT)
Dept: INFUSION THERAPY | Facility: HOSPITAL | Age: 69
End: 2019-06-24
Attending: INTERNAL MEDICINE
Payer: MEDICARE

## 2019-06-24 VITALS — DIASTOLIC BLOOD PRESSURE: 78 MMHG | SYSTOLIC BLOOD PRESSURE: 120 MMHG | HEART RATE: 65 BPM | RESPIRATION RATE: 18 BRPM

## 2019-06-24 DIAGNOSIS — D46.9 MDS (MYELODYSPLASTIC SYNDROME): Primary | ICD-10-CM

## 2019-06-24 DIAGNOSIS — Z23 NEED FOR RABIES VACCINATION: ICD-10-CM

## 2019-06-24 PROCEDURE — 96372 THER/PROPH/DIAG INJ SC/IM: CPT | Mod: PN

## 2019-06-24 PROCEDURE — 63600175 PHARM REV CODE 636 W HCPCS: Mod: PN | Performed by: INTERNAL MEDICINE

## 2019-06-24 RX ADMIN — EPOETIN ALFA-EPBX 40000 UNITS: 40000 INJECTION, SOLUTION INTRAVENOUS; SUBCUTANEOUS at 11:06

## 2019-06-27 DIAGNOSIS — G47.09 OTHER INSOMNIA: ICD-10-CM

## 2019-06-27 DIAGNOSIS — E03.9 HYPOTHYROIDISM, UNSPECIFIED TYPE: ICD-10-CM

## 2019-06-27 DIAGNOSIS — F41.1 GAD (GENERALIZED ANXIETY DISORDER): ICD-10-CM

## 2019-06-27 RX ORDER — LEVOTHYROXINE SODIUM 100 UG/1
TABLET ORAL
Qty: 90 TABLET | Refills: 0 | Status: SHIPPED | OUTPATIENT
Start: 2019-06-27 | End: 2019-09-25 | Stop reason: SDUPTHER

## 2019-07-01 ENCOUNTER — LAB VISIT (OUTPATIENT)
Dept: LAB | Facility: HOSPITAL | Age: 69
End: 2019-07-01
Attending: INTERNAL MEDICINE
Payer: MEDICARE

## 2019-07-01 DIAGNOSIS — D46.9 MDS (MYELODYSPLASTIC SYNDROME): ICD-10-CM

## 2019-07-01 LAB
BASOPHILS # BLD AUTO: 0.02 K/UL (ref 0–0.2)
BASOPHILS NFR BLD: 0.6 % (ref 0–1.9)
DIFFERENTIAL METHOD: ABNORMAL
EOSINOPHIL # BLD AUTO: 0.1 K/UL (ref 0–0.5)
EOSINOPHIL NFR BLD: 2.1 % (ref 0–8)
ERYTHROCYTE [DISTWIDTH] IN BLOOD BY AUTOMATED COUNT: 13.7 % (ref 11.5–14.5)
HCT VFR BLD AUTO: 30.5 % (ref 37–48.5)
HGB BLD-MCNC: 10.3 G/DL (ref 12–16)
IMM GRANULOCYTES # BLD AUTO: 0.02 K/UL (ref 0–0.04)
IMM GRANULOCYTES NFR BLD AUTO: 0.6 % (ref 0–0.5)
LYMPHOCYTES # BLD AUTO: 1.2 K/UL (ref 1–4.8)
LYMPHOCYTES NFR BLD: 37.4 % (ref 18–48)
MCH RBC QN AUTO: 38.9 PG (ref 27–31)
MCHC RBC AUTO-ENTMCNC: 33.8 G/DL (ref 32–36)
MCV RBC AUTO: 115 FL (ref 82–98)
MONOCYTES # BLD AUTO: 0.4 K/UL (ref 0.3–1)
MONOCYTES NFR BLD: 10.6 % (ref 4–15)
NEUTROPHILS # BLD AUTO: 1.6 K/UL (ref 1.8–7.7)
NEUTROPHILS NFR BLD: 48.7 % (ref 38–73)
NRBC BLD-RTO: 0 /100 WBC
PLATELET # BLD AUTO: 116 K/UL (ref 150–350)
PMV BLD AUTO: 10.5 FL (ref 9.2–12.9)
RBC # BLD AUTO: 2.65 M/UL (ref 4–5.4)
WBC # BLD AUTO: 3.29 K/UL (ref 3.9–12.7)

## 2019-07-01 PROCEDURE — 85025 COMPLETE CBC W/AUTO DIFF WBC: CPT | Mod: PN

## 2019-07-01 PROCEDURE — 85025 COMPLETE CBC W/AUTO DIFF WBC: CPT

## 2019-07-01 PROCEDURE — 36415 COLL VENOUS BLD VENIPUNCTURE: CPT | Mod: PN

## 2019-07-01 RX ORDER — TRAZODONE HYDROCHLORIDE 100 MG/1
TABLET ORAL
Qty: 180 TABLET | Refills: 0 | OUTPATIENT
Start: 2019-07-01

## 2019-07-08 ENCOUNTER — LAB VISIT (OUTPATIENT)
Dept: LAB | Facility: HOSPITAL | Age: 69
End: 2019-07-08
Attending: INTERNAL MEDICINE
Payer: MEDICARE

## 2019-07-08 DIAGNOSIS — D46.9 MDS (MYELODYSPLASTIC SYNDROME): ICD-10-CM

## 2019-07-08 LAB
BASOPHILS # BLD AUTO: 0.02 K/UL (ref 0–0.2)
BASOPHILS NFR BLD: 0.6 % (ref 0–1.9)
DIFFERENTIAL METHOD: ABNORMAL
EOSINOPHIL # BLD AUTO: 0 K/UL (ref 0–0.5)
EOSINOPHIL NFR BLD: 1.1 % (ref 0–8)
ERYTHROCYTE [DISTWIDTH] IN BLOOD BY AUTOMATED COUNT: 13.9 % (ref 11.5–14.5)
HCT VFR BLD AUTO: 31.1 % (ref 37–48.5)
HGB BLD-MCNC: 10.3 G/DL (ref 12–16)
IMM GRANULOCYTES # BLD AUTO: 0.01 K/UL (ref 0–0.04)
IMM GRANULOCYTES NFR BLD AUTO: 0.3 % (ref 0–0.5)
LYMPHOCYTES # BLD AUTO: 1.2 K/UL (ref 1–4.8)
LYMPHOCYTES NFR BLD: 33.3 % (ref 18–48)
MCH RBC QN AUTO: 38.7 PG (ref 27–31)
MCHC RBC AUTO-ENTMCNC: 33.1 G/DL (ref 32–36)
MCV RBC AUTO: 117 FL (ref 82–98)
MONOCYTES # BLD AUTO: 0.3 K/UL (ref 0.3–1)
MONOCYTES NFR BLD: 9.3 % (ref 4–15)
NEUTROPHILS # BLD AUTO: 2 K/UL (ref 1.8–7.7)
NEUTROPHILS NFR BLD: 55.4 % (ref 38–73)
NRBC BLD-RTO: 0 /100 WBC
PLATELET # BLD AUTO: 105 K/UL (ref 150–350)
PMV BLD AUTO: 9.9 FL (ref 9.2–12.9)
RBC # BLD AUTO: 2.66 M/UL (ref 4–5.4)
WBC # BLD AUTO: 3.54 K/UL (ref 3.9–12.7)

## 2019-07-08 PROCEDURE — 85025 COMPLETE CBC W/AUTO DIFF WBC: CPT

## 2019-07-08 PROCEDURE — 85025 COMPLETE CBC W/AUTO DIFF WBC: CPT | Mod: PN

## 2019-07-08 PROCEDURE — 36415 COLL VENOUS BLD VENIPUNCTURE: CPT | Mod: PN

## 2019-07-15 ENCOUNTER — LAB VISIT (OUTPATIENT)
Dept: LAB | Facility: HOSPITAL | Age: 69
End: 2019-07-15
Attending: INTERNAL MEDICINE
Payer: MEDICARE

## 2019-07-15 DIAGNOSIS — D46.9 MDS (MYELODYSPLASTIC SYNDROME): ICD-10-CM

## 2019-07-15 LAB
ALBUMIN SERPL BCP-MCNC: 4 G/DL (ref 3.5–5.2)
ALP SERPL-CCNC: 98 U/L (ref 38–145)
ALT SERPL W/O P-5'-P-CCNC: 35 U/L (ref 10–44)
ANION GAP SERPL CALC-SCNC: 6 MMOL/L (ref 8–16)
AST SERPL-CCNC: 29 U/L (ref 14–36)
BASOPHILS # BLD AUTO: 0.03 K/UL (ref 0–0.2)
BASOPHILS NFR BLD: 1 % (ref 0–1.9)
BILIRUB SERPL-MCNC: 0.5 MG/DL (ref 0.2–1.3)
BUN SERPL-MCNC: 12 MG/DL (ref 7–18)
CALCIUM SERPL-MCNC: 9.1 MG/DL (ref 8.4–10.2)
CHLORIDE SERPL-SCNC: 107 MMOL/L (ref 95–110)
CO2 SERPL-SCNC: 30 MMOL/L (ref 22–31)
CREAT SERPL-MCNC: 0.73 MG/DL (ref 0.5–1.4)
DIFFERENTIAL METHOD: ABNORMAL
EOSINOPHIL # BLD AUTO: 0.1 K/UL (ref 0–0.5)
EOSINOPHIL NFR BLD: 2.4 % (ref 0–8)
ERYTHROCYTE [DISTWIDTH] IN BLOOD BY AUTOMATED COUNT: 13.7 % (ref 11.5–14.5)
EST. GFR  (AFRICAN AMERICAN): >60 ML/MIN/1.73 M^2
EST. GFR  (NON AFRICAN AMERICAN): >60 ML/MIN/1.73 M^2
GLUCOSE SERPL-MCNC: 123 MG/DL (ref 70–110)
HCT VFR BLD AUTO: 31.3 % (ref 37–48.5)
HGB BLD-MCNC: 10.1 G/DL (ref 12–16)
IMM GRANULOCYTES # BLD AUTO: 0.01 K/UL (ref 0–0.04)
IMM GRANULOCYTES NFR BLD AUTO: 0.3 % (ref 0–0.5)
LDH SERPL L TO P-CCNC: 440 U/L (ref 313–618)
LYMPHOCYTES # BLD AUTO: 1 K/UL (ref 1–4.8)
LYMPHOCYTES NFR BLD: 34.9 % (ref 18–48)
MCH RBC QN AUTO: 37.8 PG (ref 27–31)
MCHC RBC AUTO-ENTMCNC: 32.3 G/DL (ref 32–36)
MCV RBC AUTO: 117 FL (ref 82–98)
MONOCYTES # BLD AUTO: 0.2 K/UL (ref 0.3–1)
MONOCYTES NFR BLD: 8.1 % (ref 4–15)
NEUTROPHILS # BLD AUTO: 1.6 K/UL (ref 1.8–7.7)
NEUTROPHILS NFR BLD: 53.3 % (ref 38–73)
NRBC BLD-RTO: 0 /100 WBC
PLATELET # BLD AUTO: 115 K/UL (ref 150–350)
PMV BLD AUTO: 9.8 FL (ref 9.2–12.9)
POTASSIUM SERPL-SCNC: 4.4 MMOL/L (ref 3.5–5.1)
PROT SERPL-MCNC: 6.6 G/DL (ref 6–8.4)
RBC # BLD AUTO: 2.67 M/UL (ref 4–5.4)
SODIUM SERPL-SCNC: 143 MMOL/L (ref 136–145)
WBC # BLD AUTO: 2.95 K/UL (ref 3.9–12.7)

## 2019-07-15 PROCEDURE — 85025 COMPLETE CBC W/AUTO DIFF WBC: CPT | Mod: PN

## 2019-07-15 PROCEDURE — 36415 COLL VENOUS BLD VENIPUNCTURE: CPT | Mod: PN

## 2019-07-15 PROCEDURE — 85025 COMPLETE CBC W/AUTO DIFF WBC: CPT

## 2019-07-15 PROCEDURE — 83615 LACTATE (LD) (LDH) ENZYME: CPT | Mod: PN

## 2019-07-15 PROCEDURE — 80053 COMPREHEN METABOLIC PANEL: CPT

## 2019-07-15 PROCEDURE — 83615 LACTATE (LD) (LDH) ENZYME: CPT

## 2019-07-15 PROCEDURE — 80053 COMPREHEN METABOLIC PANEL: CPT | Mod: PN

## 2019-07-16 ENCOUNTER — OFFICE VISIT (OUTPATIENT)
Dept: CARDIOLOGY | Facility: CLINIC | Age: 69
End: 2019-07-16
Payer: MEDICARE

## 2019-07-16 VITALS
HEART RATE: 58 BPM | SYSTOLIC BLOOD PRESSURE: 113 MMHG | DIASTOLIC BLOOD PRESSURE: 71 MMHG | HEIGHT: 67 IN | WEIGHT: 204.81 LBS | BODY MASS INDEX: 32.15 KG/M2

## 2019-07-16 DIAGNOSIS — R94.39 ABNORMAL THALLIUM STRESS TEST: Primary | ICD-10-CM

## 2019-07-16 DIAGNOSIS — I48.0 PAF (PAROXYSMAL ATRIAL FIBRILLATION): ICD-10-CM

## 2019-07-16 DIAGNOSIS — I34.0 NON-RHEUMATIC MITRAL REGURGITATION: ICD-10-CM

## 2019-07-16 DIAGNOSIS — I25.119 ATHEROSCLEROSIS OF NATIVE CORONARY ARTERY OF NATIVE HEART WITH ANGINA PECTORIS: ICD-10-CM

## 2019-07-16 PROCEDURE — 99214 OFFICE O/P EST MOD 30 MIN: CPT | Mod: S$PBB,,, | Performed by: INTERNAL MEDICINE

## 2019-07-16 PROCEDURE — 99999 PR PBB SHADOW E&M-EST. PATIENT-LVL IV: CPT | Mod: PBBFAC,,, | Performed by: INTERNAL MEDICINE

## 2019-07-16 PROCEDURE — 99999 PR PBB SHADOW E&M-EST. PATIENT-LVL IV: ICD-10-PCS | Mod: PBBFAC,,, | Performed by: INTERNAL MEDICINE

## 2019-07-16 PROCEDURE — 99214 PR OFFICE/OUTPT VISIT, EST, LEVL IV, 30-39 MIN: ICD-10-PCS | Mod: S$PBB,,, | Performed by: INTERNAL MEDICINE

## 2019-07-16 PROCEDURE — 99499 UNLISTED E&M SERVICE: CPT | Mod: S$PBB,,, | Performed by: INTERNAL MEDICINE

## 2019-07-16 PROCEDURE — 99214 OFFICE O/P EST MOD 30 MIN: CPT | Mod: PBBFAC,PO | Performed by: INTERNAL MEDICINE

## 2019-07-16 PROCEDURE — 99499 RISK ADDL DX/OHS AUDIT: ICD-10-PCS | Mod: S$PBB,,, | Performed by: INTERNAL MEDICINE

## 2019-07-16 RX ORDER — ATORVASTATIN CALCIUM 20 MG/1
20 TABLET, FILM COATED ORAL NIGHTLY
Qty: 90 TABLET | Refills: 3 | Status: SHIPPED | OUTPATIENT
Start: 2019-07-16 | End: 2020-07-15

## 2019-07-16 NOTE — PROGRESS NOTES
Subjective:    Patient ID:  Charmaine Whalen is a 69 y.o. female who presents for follow-up of No chief complaint on file.      HPI  Here for follow up of MR/non-obs CAD (12/18 [facility])/new PAF. Patient denies palpitations, syncope, presyncope, lightheadedness or dizziness.  Patients states is doing well no chest pain, SOB or change in exertional tolerence.       Review of Systems   Constitution: Negative for malaise/fatigue.   Eyes: Negative for blurred vision.   Cardiovascular: Negative for chest pain, claudication, cyanosis, dyspnea on exertion, irregular heartbeat, leg swelling, near-syncope, orthopnea, palpitations, paroxysmal nocturnal dyspnea and syncope.   Respiratory: Negative for cough and shortness of breath.    Hematologic/Lymphatic: Does not bruise/bleed easily.   Musculoskeletal: Negative for back pain, falls, joint pain, muscle cramps, muscle weakness and myalgias.   Gastrointestinal: Negative for abdominal pain, change in bowel habit, nausea and vomiting.   Genitourinary: Negative for urgency.   Neurological: Negative for dizziness, focal weakness and light-headedness.       Past Medical History:   Diagnosis Date    Abnormal thallium stress test     2/17 Mild, moderate diagonal disease. Small distal LAD. Mild diastolic dysfunction.    Allergy     Anemia     Anticoagulant long-term use     ASA 81 mg    Anxiety     Atherosclerotic heart disease of native coronary artery with angina pectoris     2/17 [facility] Mild, moderate diagonal disease. Small distal LAD. Mild diastolic dysfunction.    Cataract     OU    Cholelithiasis     Chronic pain     Leg, lower back    Colon cancer     Colon polyp     DDD (degenerative disc disease), lumbar     h/o sciatica    Depression     Encounter for blood transfusion     Esophageal stenosis     Fatty liver     Fibromyalgia     Gastritis     GERD (gastroesophageal reflux disease)     hiatal as well    Hiatal hernia     History of psychiatric hospitalization      1997 at [facility]    History of renal calculi     Hypersomnia with sleep apnea     uses C-pap    Hypothyroidism 1/28/2013    Hashimoto's, with Hypothyroidism    Irritable bowel syndrome     MDS (myelodysplastic syndrome) 1/28/2013    Dr. Xavier     Meningitis     age 3    Myelodysplastic syndrome     bone marrow cancer    CORWIN (obstructive sleep apnea)     doesn't use her cpap    PAF (paroxysmal atrial fibrillation) 12/28/2012    PAF in setting of iatrogenic hyperthyroid                                   s/p AFL RFA 2008     Pancytopenia 11/18/2013    PVD (posterior vitreous detachment)     OU    Retinal detachment     HST-OS    Rheumatic fever     as a child    Supraventricular tachycardia     Thrombocytopenia           Objective:     There were no vitals filed for this visit.     Physical Exam   Constitutional: She is oriented to person, place, and time. She appears well-developed and well-nourished.   Neck: Normal range of motion. No JVD present.   Cardiovascular: Normal rate, regular rhythm, normal heart sounds and intact distal pulses.   Pulmonary/Chest: Effort normal and breath sounds normal.   Neurological: She is alert and oriented to person, place, and time.   Skin: Skin is warm and dry.   Psychiatric: She has a normal mood and affect.   Nursing note and vitals reviewed.            ..    Chemistry        Component Value Date/Time     07/15/2019 0809    K 4.4 07/15/2019 0809     07/15/2019 0809    CO2 30 07/15/2019 0809    BUN 12 07/15/2019 0809    CREATININE 0.73 07/15/2019 0809     (H) 07/15/2019 0809        Component Value Date/Time    CALCIUM 9.1 07/15/2019 0809    ALKPHOS 98 07/15/2019 0809    AST 29 07/15/2019 0809    AST 24 11/09/2015 1449    ALT 35 07/15/2019 0809    BILITOT 0.5 07/15/2019 0809    ESTGFRAFRICA >60 07/15/2019 0809    EGFRNONAA >60 07/15/2019 0809            ..  Lab Results   Component Value Date    CHOL 150 12/06/2018    CHOL 143 02/17/2017    CHOL  184 01/08/2015     Lab Results   Component Value Date    HDL 45 12/06/2018    HDL 43 02/17/2017    HDL 53 01/08/2015     Lab Results   Component Value Date    LDLCALC 90.2 12/06/2018    LDLCALC 86.6 02/17/2017    LDLCALC 116.4 01/08/2015     Lab Results   Component Value Date    TRIG 74 12/06/2018    TRIG 67 02/17/2017    TRIG 73 01/08/2015     Lab Results   Component Value Date    CHOLHDL 30.0 12/06/2018    CHOLHDL 30.1 02/17/2017    CHOLHDL 28.8 01/08/2015     ..  Lab Results   Component Value Date    WBC 2.95 (L) 07/15/2019    HGB 10.1 (L) 07/15/2019    HCT 31.3 (L) 07/15/2019     (H) 07/15/2019     (L) 07/15/2019       Test(s) Reviewed  I have reviewed the following in detail:  [] Stress test   [] Angiography   [x] Echocardiogram   [x] Labs   [] Other:       Assessment:         ICD-10-CM ICD-9-CM   1. Abnormal thallium stress test R94.39 794.39   2. Atherosclerosis of native coronary artery of native heart with angina pectoris I25.119 414.01     413.9   3. Non-rheumatic mitral regurgitation I34.0 424.0   4. PAF (paroxysmal atrial fibrillation) I48.0 427.31     Problem List Items Addressed This Visit     PAF (paroxysmal atrial fibrillation)    Overview     PAF in setting of iatrogenic hyperthyroid                                    s/p AFL RFA 2008         Mitral regurgitation    Atherosclerotic heart disease of native coronary artery with angina pectoris    Overview     2/17 [facility]  Mild, moderate diagonal disease.  Small distal LAD.  Mild diastolic dysfunction.         Abnormal thallium stress test - Primary    Overview     2/17  Mild, moderate diagonal disease.  Small distal LAD.  Mild diastolic dysfunction.                Plan:           Return to clinic 7 month  Low level/low impact aerobic exercise 5x's/wk. Heart healthy diet and risk factor modification.    See labs and med orders.  Decrease lipitor LDL was 90 prior to initiation.     Portions of this note may have been created with voice  recognition software.  Grammatical, syntax and spelling errors may be inevitable.

## 2019-07-22 ENCOUNTER — LAB VISIT (OUTPATIENT)
Dept: LAB | Facility: HOSPITAL | Age: 69
End: 2019-07-22
Attending: INTERNAL MEDICINE
Payer: MEDICARE

## 2019-07-22 DIAGNOSIS — D46.9 MDS (MYELODYSPLASTIC SYNDROME): ICD-10-CM

## 2019-07-22 LAB
BASOPHILS # BLD AUTO: 0.01 K/UL (ref 0–0.2)
BASOPHILS NFR BLD: 0.3 % (ref 0–1.9)
DIFFERENTIAL METHOD: ABNORMAL
EOSINOPHIL # BLD AUTO: 0.1 K/UL (ref 0–0.5)
EOSINOPHIL NFR BLD: 1.8 % (ref 0–8)
ERYTHROCYTE [DISTWIDTH] IN BLOOD BY AUTOMATED COUNT: 13.6 % (ref 11.5–14.5)
HCT VFR BLD AUTO: 30.6 % (ref 37–48.5)
HGB BLD-MCNC: 10 G/DL (ref 12–16)
IMM GRANULOCYTES # BLD AUTO: 0 K/UL (ref 0–0.04)
IMM GRANULOCYTES NFR BLD AUTO: 0 % (ref 0–0.5)
LYMPHOCYTES # BLD AUTO: 1.1 K/UL (ref 1–4.8)
LYMPHOCYTES NFR BLD: 32.9 % (ref 18–48)
MCH RBC QN AUTO: 38.3 PG (ref 27–31)
MCHC RBC AUTO-ENTMCNC: 32.7 G/DL (ref 32–36)
MCV RBC AUTO: 117 FL (ref 82–98)
MONOCYTES # BLD AUTO: 0.3 K/UL (ref 0.3–1)
MONOCYTES NFR BLD: 9.8 % (ref 4–15)
NEUTROPHILS # BLD AUTO: 1.9 K/UL (ref 1.8–7.7)
NEUTROPHILS NFR BLD: 55.2 % (ref 38–73)
NRBC BLD-RTO: 0 /100 WBC
PLATELET # BLD AUTO: 109 K/UL (ref 150–350)
PMV BLD AUTO: 10.3 FL (ref 9.2–12.9)
RBC # BLD AUTO: 2.61 M/UL (ref 4–5.4)
WBC # BLD AUTO: 3.37 K/UL (ref 3.9–12.7)

## 2019-07-22 PROCEDURE — 36415 COLL VENOUS BLD VENIPUNCTURE: CPT | Mod: PN

## 2019-07-22 PROCEDURE — 85025 COMPLETE CBC W/AUTO DIFF WBC: CPT | Mod: PN

## 2019-07-22 PROCEDURE — 85025 COMPLETE CBC W/AUTO DIFF WBC: CPT

## 2019-07-27 DIAGNOSIS — F41.1 GAD (GENERALIZED ANXIETY DISORDER): ICD-10-CM

## 2019-07-27 DIAGNOSIS — F33.0 MDD (MAJOR DEPRESSIVE DISORDER), RECURRENT EPISODE, MILD: ICD-10-CM

## 2019-07-29 ENCOUNTER — LAB VISIT (OUTPATIENT)
Dept: LAB | Facility: HOSPITAL | Age: 69
End: 2019-07-29
Attending: INTERNAL MEDICINE
Payer: MEDICARE

## 2019-07-29 ENCOUNTER — OFFICE VISIT (OUTPATIENT)
Dept: PAIN MEDICINE | Facility: CLINIC | Age: 69
End: 2019-07-29
Payer: MEDICARE

## 2019-07-29 ENCOUNTER — TELEPHONE (OUTPATIENT)
Dept: PAIN MEDICINE | Facility: CLINIC | Age: 69
End: 2019-07-29

## 2019-07-29 VITALS
WEIGHT: 203.63 LBS | BODY MASS INDEX: 31.89 KG/M2 | OXYGEN SATURATION: 98 % | RESPIRATION RATE: 18 BRPM | HEART RATE: 75 BPM | DIASTOLIC BLOOD PRESSURE: 58 MMHG | SYSTOLIC BLOOD PRESSURE: 112 MMHG | TEMPERATURE: 98 F

## 2019-07-29 DIAGNOSIS — M54.16 LUMBAR RADICULITIS: ICD-10-CM

## 2019-07-29 DIAGNOSIS — M51.36 DDD (DEGENERATIVE DISC DISEASE), LUMBAR: ICD-10-CM

## 2019-07-29 DIAGNOSIS — G89.4 CHRONIC PAIN DISORDER: ICD-10-CM

## 2019-07-29 DIAGNOSIS — D46.9 MDS (MYELODYSPLASTIC SYNDROME): ICD-10-CM

## 2019-07-29 DIAGNOSIS — M54.12 CERVICAL RADICULOPATHY: Primary | ICD-10-CM

## 2019-07-29 LAB
BASOPHILS # BLD AUTO: 0.04 K/UL (ref 0–0.2)
BASOPHILS NFR BLD: 1.1 % (ref 0–1.9)
DIFFERENTIAL METHOD: ABNORMAL
EOSINOPHIL # BLD AUTO: 0.1 K/UL (ref 0–0.5)
EOSINOPHIL NFR BLD: 2.1 % (ref 0–8)
ERYTHROCYTE [DISTWIDTH] IN BLOOD BY AUTOMATED COUNT: 13.4 % (ref 11.5–14.5)
HCT VFR BLD AUTO: 31.4 % (ref 37–48.5)
HGB BLD-MCNC: 10.5 G/DL (ref 12–16)
IMM GRANULOCYTES # BLD AUTO: 0.01 K/UL (ref 0–0.04)
IMM GRANULOCYTES NFR BLD AUTO: 0.3 % (ref 0–0.5)
LYMPHOCYTES # BLD AUTO: 1.3 K/UL (ref 1–4.8)
LYMPHOCYTES NFR BLD: 33.8 % (ref 18–48)
MCH RBC QN AUTO: 38.7 PG (ref 27–31)
MCHC RBC AUTO-ENTMCNC: 33.4 G/DL (ref 32–36)
MCV RBC AUTO: 116 FL (ref 82–98)
MONOCYTES # BLD AUTO: 0.4 K/UL (ref 0.3–1)
MONOCYTES NFR BLD: 9.5 % (ref 4–15)
NEUTROPHILS # BLD AUTO: 2 K/UL (ref 1.8–7.7)
NEUTROPHILS NFR BLD: 53.2 % (ref 38–73)
NRBC BLD-RTO: 0 /100 WBC
PLATELET # BLD AUTO: 116 K/UL (ref 150–350)
PMV BLD AUTO: 10.9 FL (ref 9.2–12.9)
RBC # BLD AUTO: 2.71 M/UL (ref 4–5.4)
WBC # BLD AUTO: 3.79 K/UL (ref 3.9–12.7)

## 2019-07-29 PROCEDURE — 99215 OFFICE O/P EST HI 40 MIN: CPT | Mod: PBBFAC,PN | Performed by: PHYSICIAN ASSISTANT

## 2019-07-29 PROCEDURE — 99999 PR PBB SHADOW E&M-EST. PATIENT-LVL V: CPT | Mod: PBBFAC,,, | Performed by: PHYSICIAN ASSISTANT

## 2019-07-29 PROCEDURE — 85025 COMPLETE CBC W/AUTO DIFF WBC: CPT

## 2019-07-29 PROCEDURE — 99214 OFFICE O/P EST MOD 30 MIN: CPT | Mod: S$PBB,,, | Performed by: PHYSICIAN ASSISTANT

## 2019-07-29 PROCEDURE — 36415 COLL VENOUS BLD VENIPUNCTURE: CPT | Mod: PN

## 2019-07-29 PROCEDURE — 85025 COMPLETE CBC W/AUTO DIFF WBC: CPT | Mod: PN

## 2019-07-29 PROCEDURE — 99214 PR OFFICE/OUTPT VISIT, EST, LEVL IV, 30-39 MIN: ICD-10-PCS | Mod: S$PBB,,, | Performed by: PHYSICIAN ASSISTANT

## 2019-07-29 PROCEDURE — 99999 PR PBB SHADOW E&M-EST. PATIENT-LVL V: ICD-10-PCS | Mod: PBBFAC,,, | Performed by: PHYSICIAN ASSISTANT

## 2019-07-29 RX ORDER — SODIUM CHLORIDE, SODIUM LACTATE, POTASSIUM CHLORIDE, CALCIUM CHLORIDE 600; 310; 30; 20 MG/100ML; MG/100ML; MG/100ML; MG/100ML
INJECTION, SOLUTION INTRAVENOUS CONTINUOUS
Status: CANCELLED | OUTPATIENT
Start: 2019-08-13

## 2019-07-29 RX ORDER — FLUOXETINE HYDROCHLORIDE 40 MG/1
CAPSULE ORAL
Qty: 90 CAPSULE | Refills: 0 | Status: SHIPPED | OUTPATIENT
Start: 2019-07-29 | End: 2019-10-25 | Stop reason: SDUPTHER

## 2019-07-29 NOTE — TELEPHONE ENCOUNTER
Patient saw Haroon Hernandez this afternoon and has recommended a cervical steroid injection. The patient takes aspirin and will need to stop aspirin 7 days before the injection. Injection tentatively scheduled for 8/13, pending medication approval. Please advise.

## 2019-07-29 NOTE — TELEPHONE ENCOUNTER
MD Eleni Lam LPN   Caller: Unspecified (Today,  3:48 PM)             Ok to hold eliquis, xarelto, pradaxa, coumadin for 2 days only (all that is needed, this is not anti-platelet medication) prior to procedure. Resume ASAP post procedure.    Previous Messages      ----- Message -----   From: Eleni Waller LPN   Sent: 7/29/2019   3:55 PM   To: Harlan Alcantar MD

## 2019-07-31 NOTE — PROGRESS NOTES
This note was completed with dictation software and grammatical errors may exist.    CC: back and leg pain    HPI: The patient is a 69 year-old Female with a history of colon CA, sleep apnea, anemia, depression and postlaminectomy syndrome now with myelodysplastic syndrome who presents in referral from Dr. Villanueva.  She returns in follow-up today with multiple pain complaints.  She describes left-sided neck pain radiating to the left trapezius muscle and to the right shoulder and upper arm.  This is worse with turning her head from side to side but also constant.  Her other complaint is bilateral low back pain with radiation to the left lower leg.  In the past she had right leg pain and her spinal cord stimulator was covering her back and right leg pain well.  She has not met with the representative in over 6 months.  She denies having any weakness or numbness, no bladder or bowel incontinence.    Pain intervention history:  She is status post caudal epidural steroid injection on 5/7/14 with 90% relief lasting 3 days, then the pain slowly came back over 7 days.  She is status post caudal epidural steroid injection on 6/17/14 with 70% relief.  She is status post caudal epidural steroid injection on 9/10/15 with almost complete relief lasting 5-6 days.  She is status post left L3 and L5 transforaminal epidural steroid injections on 10/14/15 with 0% relief.  She is status post left L4 transforaminal epidural steroid injection on 1/13/16 with about 90% relief lasting one week.  She underwent removal spinal cord stimulator and L4/5 hardware on 3/16/16 with Dr. Aldrich.  She is status post caudal epidural steroid injection on 4/25/17 with 0% relief.  She is status post bilateral L5/S1 facet joint injections on 7/14/17 with 0% relief.     ROS:She reports fatigue, joint pain.  Balance of review of systems is negative.    Medical, surgical, family and social history reviewed elsewhere in  record.    Medications/Allergies: See med card    Vitals:    07/29/19 1346   BP: (!) 112/58   Pulse: 75   Resp: 18   Temp: 98 °F (36.7 °C)   TempSrc: Oral   SpO2: 98%   Weight: 92.4 kg (203 lb 9.5 oz)   PainSc:   4   PainLoc: Back         Physical exam:  Gen: A and O x3, pleasant, well-groomed  Skin: No rashes or obvious lesions  HEENT: PERRLA  CVS: Regular rate and rhythm, normal S1 and S2, no murmurs.  Resp: Clear to auscultation bilaterally, no wheezes or rales.  Abdomen: Soft, NT/ND, normal bowel sounds present.  Musculoskeletal: Nonantalgic gait.    Neuro:  Upper extremities: 5/5 strength bilaterally   Lower extremities: 5/5 strength bilaterally  Reflexes: Brachioradialis 2+, Bicep 2+, Tricep 2+. Patellar 0+, Achilles 2+ bilaterally.  Sensory: Intact and symmetrical to light touch and pinprick in C2-T1 dermatomes bilaterally. Intact and symmetrical to light touch and pinprick in L2-S1 dermatomes bilaterally.    Cervical Spine:  Cervical spine: ROM is full in flexion, extension and lateral rotation with increased left neck pain during each maneuver.  Spurling's maneuver causes no neck pain to either side.  Myofascial exam:  Moderate tenderness to palpation to the left trapezius muscle.    Lumbar spine exam:  Range of motion is mildly reduced with flexion and extension with increased low back pain during each maneuver.  Luca's test causes back pain only.  Straight leg raise causes low back and left lower leg pain.  Internal and external rotation the hip is negative bilaterally.  Myofascial exam: Mild tenderness to palpation to the lumbar paraspinous muscles.    Imaging:  MRI LUMBAR SPINE W/WO CONTRAST ON 8/22/06  1. MINIMAL L3-4 DISC BULGE CAUSING MINIMAL THECAL SAC COMPRESSION, AND THERE IS ALSO MILD BILATERAL DEGENERATIVE FACET ARTHROSIS.   2. L4-5 DISCECTOMY AND L5 LAMINECTOMY WITH NO EVIDENCE OF A SIGNIFICANT SPINAL CANAL COMPROMISE. THERE IS MILD BILATERAL FORAMINAL NARROWING AT THE L4-5 LEVEL   3.  DEGENERATION AND MILD BULGING OF THE L5-S1 DISC WITH ASSOCIATED MILD THECAL SAC COMPRESSION.   4. ROUNDED MASS ARISING FROM THE LEFT KIDNEY WHICH MOST LIKELY REPRESENTS A CYST. ULTRASOUND IS RECOMMENDED TO EXCLUDE A RENAL NEOPLASM.    2/11/14 x-ray tib-fib:  No obvious right tibial or fibular fracture is noted. No worrisome abnormality is noted. A linear metallic density is noted on the lateral view overlying the forefoot dorsally near the navicular cuneiform articulation. This could relate to a foreign body or overlying density relating to clothing. Please clinically correlate a small quadriceps patellar spur is noted.    10/1/15 CT lumbar spine  T12/L1: Mild disk bulging is evident and degenerative facet changes are noted at this level. The canal and foramina are grossly intact.  L1/L2: Mild annular disk bulge and mild degenerative facet changes produce only minimal canal and foraminal distortion to  L2/L3: Degenerative facet changes noted in the mild annular disk and facet disease.  L3/L4: Posterior canal ligamentous hypertrophy and degenerative facet changes are present. There is moderate annular disk bulging. This combines to produce mild canal and left greater than right foraminal narrowing. This is slightly worsened from the previous exam.  L4/L5: The canal is grossly intact as the surgical level. Degenerative facet changes are noted bilaterally.  L5/S1: Moderate degenerative facet changes noted bilaterally and there is suggestion of disk bulging with left greater than right foraminal narrowing.    11/16/16 MRI lumbar spine with and without contrast  There have been prior laminectomies at L4 and L5, with interbody spacer present at L4-5.  Vertebral body height and alignment are within normal limits.  Marrow signal is heterogeneous.  There is moderate marrow enhancement within the L4 and L5 vertebral bodies centered about the disc space which is likely reactive.  The conus terminates at T12-L1.  Disc  desiccation is present throughout the lumbar spine.  Mild loss of disc height is present at the L5-S1 level.  L1-L2:  No disc herniation. No spinal canal or neuroforaminal narrowing.  L2-L3:  No disc herniation. No spinal canal or neuroforaminal narrowing.  L3-L4:  There is mild diffuse disc bulging and moderate facet arthropathy with significant thickening of the ligament of flavum.  The findings contribute to mild spinal canal narrowing without significant neuroforaminal narrowing.  L4-L5:  Moderate bilateral neuroforaminal narrowing is present which appears predominantly due to facet arthropathy, with some contribution from enhancing scar tissue formation within the neuroforamen on the right.  No spinal canal narrowing.  L5-S1:  There is mild broad-based posterior bulging of the disc, abdomen and moderate bilateral facet arthropathy.  There is resultant mild bilateral neuroforaminal narrowing.    03/28/2018 CT cervical spine  Multiple computerized images of the cervical spine were obtained in axial projection using thin slices.  No intravenous contrast was administered.  Study was viewed at soft tissue and bone window settings.  Reconstructions were done in coronal and sagittal planes.  There is no apparent fracture or subluxation.  The prevertebral soft tissue space is not widened.  There are degenerative changes present involving mainly the C5-C6 and C6-C7 levels. Findings consistent of vertebral body osteophyte formation and narrowing of the disc spaces.      Assessment:  The patient is a 69year-old Female with a history of colon CA, sleep apnea, anemia, depression and postlaminectomy syndrome now with myelodysplastic syndrome who presents in referral from Dr. Villanueva.  1. Cervical radiculopathy  Vital signs    Place 18-22 [facility] IV     Verify informed consent    Notify physician     Notify physician     Notify physician (specify)    Diet NPO    Case Request Operating Room:  Injection-steroid-epidural-cervical    Place in Outpatient    lactated ringers infusion   2. Chronic pain disorder     3. Lumbar radiculitis     4. DDD (degenerative disc disease), lumbar         Plan:  1.  We reviewed her cervical spine CT from last year and discussed that she has some degenerative changes mainly at C5/6 and C6/7 in the form of disc desiccation, foraminal stenosis and arthritis.  I will schedule her for a C7-T1 interlaminar epidural steroid injection. She is a high risk patient with atrial fibrillation and mitral regurgitation.  We will obtain approval to hold her aspirin prior to her procedure.  2.  She is going to meet with the spinal cord stimulator representative from [facility] for reprogramming.  3.  Follow-up in 4 weeks postprocedure sooner as needed.

## 2019-07-31 NOTE — H&P (VIEW-ONLY)
This note was completed with dictation software and grammatical errors may exist.    CC: back and leg pain    HPI: The patient is a 69 year-old Female with a history of colon CA, sleep apnea, anemia, depression and postlaminectomy syndrome now with myelodysplastic syndrome who presents in referral from Dr. Villanueva.  She returns in follow-up today with multiple pain complaints.  She describes left-sided neck pain radiating to the left trapezius muscle and to the right shoulder and upper arm.  This is worse with turning her head from side to side but also constant.  Her other complaint is bilateral low back pain with radiation to the left lower leg.  In the past she had right leg pain and her spinal cord stimulator was covering her back and right leg pain well.  She has not met with the representative in over 6 months.  She denies having any weakness or numbness, no bladder or bowel incontinence.    Pain intervention history:  She is status post caudal epidural steroid injection on 5/7/14 with 90% relief lasting 3 days, then the pain slowly came back over 7 days.  She is status post caudal epidural steroid injection on 6/17/14 with 70% relief.  She is status post caudal epidural steroid injection on 9/10/15 with almost complete relief lasting 5-6 days.  She is status post left L3 and L5 transforaminal epidural steroid injections on 10/14/15 with 0% relief.  She is status post left L4 transforaminal epidural steroid injection on 1/13/16 with about 90% relief lasting one week.  She underwent removal spinal cord stimulator and L4/5 hardware on 3/16/16 with Dr. Aldrich.  She is status post caudal epidural steroid injection on 4/25/17 with 0% relief.  She is status post bilateral L5/S1 facet joint injections on 7/14/17 with 0% relief.     ROS:She reports fatigue, joint pain.  Balance of review of systems is negative.    Medical, surgical, family and social history reviewed elsewhere in  record.    Medications/Allergies: See med card    Vitals:    07/29/19 1346   BP: (!) 112/58   Pulse: 75   Resp: 18   Temp: 98 °F (36.7 °C)   TempSrc: Oral   SpO2: 98%   Weight: 92.4 kg (203 lb 9.5 oz)   PainSc:   4   PainLoc: Back         Physical exam:  Gen: A and O x3, pleasant, well-groomed  Skin: No rashes or obvious lesions  HEENT: PERRLA  CVS: Regular rate and rhythm, normal S1 and S2, no murmurs.  Resp: Clear to auscultation bilaterally, no wheezes or rales.  Abdomen: Soft, NT/ND, normal bowel sounds present.  Musculoskeletal: Nonantalgic gait.    Neuro:  Upper extremities: 5/5 strength bilaterally   Lower extremities: 5/5 strength bilaterally  Reflexes: Brachioradialis 2+, Bicep 2+, Tricep 2+. Patellar 0+, Achilles 2+ bilaterally.  Sensory: Intact and symmetrical to light touch and pinprick in C2-T1 dermatomes bilaterally. Intact and symmetrical to light touch and pinprick in L2-S1 dermatomes bilaterally.    Cervical Spine:  Cervical spine: ROM is full in flexion, extension and lateral rotation with increased left neck pain during each maneuver.  Spurling's maneuver causes no neck pain to either side.  Myofascial exam:  Moderate tenderness to palpation to the left trapezius muscle.    Lumbar spine exam:  Range of motion is mildly reduced with flexion and extension with increased low back pain during each maneuver.  Luca's test causes back pain only.  Straight leg raise causes low back and left lower leg pain.  Internal and external rotation the hip is negative bilaterally.  Myofascial exam: Mild tenderness to palpation to the lumbar paraspinous muscles.    Imaging:  MRI LUMBAR SPINE W/WO CONTRAST ON 8/22/06  1. MINIMAL L3-4 DISC BULGE CAUSING MINIMAL THECAL SAC COMPRESSION, AND THERE IS ALSO MILD BILATERAL DEGENERATIVE FACET ARTHROSIS.   2. L4-5 DISCECTOMY AND L5 LAMINECTOMY WITH NO EVIDENCE OF A SIGNIFICANT SPINAL CANAL COMPROMISE. THERE IS MILD BILATERAL FORAMINAL NARROWING AT THE L4-5 LEVEL   3.  DEGENERATION AND MILD BULGING OF THE L5-S1 DISC WITH ASSOCIATED MILD THECAL SAC COMPRESSION.   4. ROUNDED MASS ARISING FROM THE LEFT KIDNEY WHICH MOST LIKELY REPRESENTS A CYST. ULTRASOUND IS RECOMMENDED TO EXCLUDE A RENAL NEOPLASM.    2/11/14 x-ray tib-fib:  No obvious right tibial or fibular fracture is noted. No worrisome abnormality is noted. A linear metallic density is noted on the lateral view overlying the forefoot dorsally near the navicular cuneiform articulation. This could relate to a foreign body or overlying density relating to clothing. Please clinically correlate a small quadriceps patellar spur is noted.    10/1/15 CT lumbar spine  T12/L1: Mild disk bulging is evident and degenerative facet changes are noted at this level. The canal and foramina are grossly intact.  L1/L2: Mild annular disk bulge and mild degenerative facet changes produce only minimal canal and foraminal distortion to  L2/L3: Degenerative facet changes noted in the mild annular disk and facet disease.  L3/L4: Posterior canal ligamentous hypertrophy and degenerative facet changes are present. There is moderate annular disk bulging. This combines to produce mild canal and left greater than right foraminal narrowing. This is slightly worsened from the previous exam.  L4/L5: The canal is grossly intact as the surgical level. Degenerative facet changes are noted bilaterally.  L5/S1: Moderate degenerative facet changes noted bilaterally and there is suggestion of disk bulging with left greater than right foraminal narrowing.    11/16/16 MRI lumbar spine with and without contrast  There have been prior laminectomies at L4 and L5, with interbody spacer present at L4-5.  Vertebral body height and alignment are within normal limits.  Marrow signal is heterogeneous.  There is moderate marrow enhancement within the L4 and L5 vertebral bodies centered about the disc space which is likely reactive.  The conus terminates at T12-L1.  Disc  desiccation is present throughout the lumbar spine.  Mild loss of disc height is present at the L5-S1 level.  L1-L2:  No disc herniation. No spinal canal or neuroforaminal narrowing.  L2-L3:  No disc herniation. No spinal canal or neuroforaminal narrowing.  L3-L4:  There is mild diffuse disc bulging and moderate facet arthropathy with significant thickening of the ligament of flavum.  The findings contribute to mild spinal canal narrowing without significant neuroforaminal narrowing.  L4-L5:  Moderate bilateral neuroforaminal narrowing is present which appears predominantly due to facet arthropathy, with some contribution from enhancing scar tissue formation within the neuroforamen on the right.  No spinal canal narrowing.  L5-S1:  There is mild broad-based posterior bulging of the disc, abdomen and moderate bilateral facet arthropathy.  There is resultant mild bilateral neuroforaminal narrowing.    03/28/2018 CT cervical spine  Multiple computerized images of the cervical spine were obtained in axial projection using thin slices.  No intravenous contrast was administered.  Study was viewed at soft tissue and bone window settings.  Reconstructions were done in coronal and sagittal planes.  There is no apparent fracture or subluxation.  The prevertebral soft tissue space is not widened.  There are degenerative changes present involving mainly the C5-C6 and C6-C7 levels. Findings consistent of vertebral body osteophyte formation and narrowing of the disc spaces.      Assessment:  The patient is a 69year-old Female with a history of colon CA, sleep apnea, anemia, depression and postlaminectomy syndrome now with myelodysplastic syndrome who presents in referral from Dr. Villanueva.  1. Cervical radiculopathy  Vital signs    Place 18-22 [facility] IV     Verify informed consent    Notify physician     Notify physician     Notify physician (specify)    Diet NPO    Case Request Operating Room:  Injection-steroid-epidural-cervical    Place in Outpatient    lactated ringers infusion   2. Chronic pain disorder     3. Lumbar radiculitis     4. DDD (degenerative disc disease), lumbar         Plan:  1.  We reviewed her cervical spine CT from last year and discussed that she has some degenerative changes mainly at C5/6 and C6/7 in the form of disc desiccation, foraminal stenosis and arthritis.  I will schedule her for a C7-T1 interlaminar epidural steroid injection. She is a high risk patient with atrial fibrillation and mitral regurgitation.  We will obtain approval to hold her aspirin prior to her procedure.  2.  She is going to meet with the spinal cord stimulator representative from [facility] for reprogramming.  3.  Follow-up in 4 weeks postprocedure sooner as needed.

## 2019-08-05 ENCOUNTER — LAB VISIT (OUTPATIENT)
Dept: LAB | Facility: HOSPITAL | Age: 69
End: 2019-08-05
Attending: PHYSICIAN ASSISTANT
Payer: MEDICARE

## 2019-08-05 DIAGNOSIS — D46.9 MDS (MYELODYSPLASTIC SYNDROME): ICD-10-CM

## 2019-08-05 LAB
BASOPHILS # BLD AUTO: 0.02 K/UL (ref 0–0.2)
BASOPHILS NFR BLD: 0.6 % (ref 0–1.9)
DIFFERENTIAL METHOD: ABNORMAL
EOSINOPHIL # BLD AUTO: 0.1 K/UL (ref 0–0.5)
EOSINOPHIL NFR BLD: 1.9 % (ref 0–8)
ERYTHROCYTE [DISTWIDTH] IN BLOOD BY AUTOMATED COUNT: 13.4 % (ref 11.5–14.5)
HCT VFR BLD AUTO: 31 % (ref 37–48.5)
HGB BLD-MCNC: 10.1 G/DL (ref 12–16)
IMM GRANULOCYTES # BLD AUTO: 0.01 K/UL (ref 0–0.04)
IMM GRANULOCYTES NFR BLD AUTO: 0.3 % (ref 0–0.5)
LYMPHOCYTES # BLD AUTO: 1.3 K/UL (ref 1–4.8)
LYMPHOCYTES NFR BLD: 35.6 % (ref 18–48)
MCH RBC QN AUTO: 38.3 PG (ref 27–31)
MCHC RBC AUTO-ENTMCNC: 32.6 G/DL (ref 32–36)
MCV RBC AUTO: 117 FL (ref 82–98)
MONOCYTES # BLD AUTO: 0.4 K/UL (ref 0.3–1)
MONOCYTES NFR BLD: 10.8 % (ref 4–15)
NEUTROPHILS # BLD AUTO: 1.8 K/UL (ref 1.8–7.7)
NEUTROPHILS NFR BLD: 50.8 % (ref 38–73)
NRBC BLD-RTO: 0 /100 WBC
PLATELET # BLD AUTO: 118 K/UL (ref 150–350)
PMV BLD AUTO: 10.4 FL (ref 9.2–12.9)
RBC # BLD AUTO: 2.64 M/UL (ref 4–5.4)
WBC # BLD AUTO: 3.6 K/UL (ref 3.9–12.7)

## 2019-08-05 PROCEDURE — 85025 COMPLETE CBC W/AUTO DIFF WBC: CPT

## 2019-08-05 PROCEDURE — 85025 COMPLETE CBC W/AUTO DIFF WBC: CPT | Mod: PN

## 2019-08-05 PROCEDURE — 36415 COLL VENOUS BLD VENIPUNCTURE: CPT | Mod: PN

## 2019-08-06 DIAGNOSIS — F33.0 MDD (MAJOR DEPRESSIVE DISORDER), RECURRENT EPISODE, MILD: ICD-10-CM

## 2019-08-06 RX ORDER — SUCRALFATE 1 G/1
TABLET ORAL
Qty: 360 TABLET | Refills: 3 | Status: SHIPPED | OUTPATIENT
Start: 2019-08-06 | End: 2019-10-23 | Stop reason: SDUPTHER

## 2019-08-08 ENCOUNTER — TELEPHONE (OUTPATIENT)
Dept: GASTROENTEROLOGY | Facility: CLINIC | Age: 69
End: 2019-08-08

## 2019-08-12 ENCOUNTER — INFUSION (OUTPATIENT)
Dept: INFUSION THERAPY | Facility: HOSPITAL | Age: 69
End: 2019-08-12
Attending: PHYSICIAN ASSISTANT
Payer: MEDICARE

## 2019-08-12 VITALS
TEMPERATURE: 98 F | RESPIRATION RATE: 20 BRPM | HEART RATE: 61 BPM | SYSTOLIC BLOOD PRESSURE: 114 MMHG | DIASTOLIC BLOOD PRESSURE: 71 MMHG

## 2019-08-12 DIAGNOSIS — M50.30 DDD (DEGENERATIVE DISC DISEASE), CERVICAL: Primary | ICD-10-CM

## 2019-08-12 DIAGNOSIS — Z23 NEED FOR RABIES VACCINATION: ICD-10-CM

## 2019-08-12 DIAGNOSIS — D46.9 MDS (MYELODYSPLASTIC SYNDROME): Primary | ICD-10-CM

## 2019-08-12 PROCEDURE — 96372 THER/PROPH/DIAG INJ SC/IM: CPT | Mod: PN

## 2019-08-12 PROCEDURE — 63600175 PHARM REV CODE 636 W HCPCS: Mod: PN | Performed by: PHYSICIAN ASSISTANT

## 2019-08-12 RX ORDER — BUPROPION HYDROCHLORIDE 300 MG/1
TABLET ORAL
Qty: 90 TABLET | Refills: 0 | Status: SHIPPED | OUTPATIENT
Start: 2019-08-12

## 2019-08-12 RX ADMIN — EPOETIN ALFA-EPBX 40000 UNITS: 40000 INJECTION, SOLUTION INTRAVENOUS; SUBCUTANEOUS at 11:08

## 2019-08-13 ENCOUNTER — HOSPITAL ENCOUNTER (OUTPATIENT)
Dept: RADIOLOGY | Facility: HOSPITAL | Age: 69
Discharge: HOME | End: 2019-08-13
Attending: ANESTHESIOLOGY | Admitting: ANESTHESIOLOGY
Payer: MEDICARE

## 2019-08-13 ENCOUNTER — HOSPITAL ENCOUNTER (OUTPATIENT)
Facility: HOSPITAL | Age: 69
Discharge: HOME | End: 2019-08-13
Attending: ANESTHESIOLOGY | Admitting: ANESTHESIOLOGY
Payer: MEDICARE

## 2019-08-13 DIAGNOSIS — M50.30 DDD (DEGENERATIVE DISC DISEASE), CERVICAL: ICD-10-CM

## 2019-08-13 DIAGNOSIS — M54.12 CERVICAL RADICULOPATHY: Primary | ICD-10-CM

## 2019-08-13 PROCEDURE — 62321 PR INJ CERV/THORAC, W/GUIDANCE: ICD-10-PCS | Mod: ,,, | Performed by: ANESTHESIOLOGY

## 2019-08-13 PROCEDURE — 76000 FLUOROSCOPY <1 HR PHYS/QHP: CPT | Mod: TC,PO

## 2019-08-13 PROCEDURE — A4216 STERILE WATER/SALINE, 10 ML: HCPCS | Mod: PO | Performed by: ANESTHESIOLOGY

## 2019-08-13 PROCEDURE — 25500020 PHARM REV CODE 255: Mod: PO | Performed by: ANESTHESIOLOGY

## 2019-08-13 PROCEDURE — 25000003 PHARM REV CODE 250: Mod: PO | Performed by: ANESTHESIOLOGY

## 2019-08-13 PROCEDURE — 99152 MOD SED SAME PHYS/QHP 5/>YRS: CPT | Mod: ,,, | Performed by: ANESTHESIOLOGY

## 2019-08-13 PROCEDURE — 63600175 PHARM REV CODE 636 W HCPCS: Mod: PO | Performed by: ANESTHESIOLOGY

## 2019-08-13 PROCEDURE — 62321 NJX INTERLAMINAR CRV/THRC: CPT | Mod: PO | Performed by: ANESTHESIOLOGY

## 2019-08-13 PROCEDURE — 99152 PR MOD CONSCIOUS SEDATION, SAME PHYS, 5+ YRS, FIRST 15 MIN: ICD-10-PCS | Mod: ,,, | Performed by: ANESTHESIOLOGY

## 2019-08-13 PROCEDURE — 62321 NJX INTERLAMINAR CRV/THRC: CPT | Mod: ,,, | Performed by: ANESTHESIOLOGY

## 2019-08-13 RX ORDER — SODIUM CHLORIDE, SODIUM LACTATE, POTASSIUM CHLORIDE, CALCIUM CHLORIDE 600; 310; 30; 20 MG/100ML; MG/100ML; MG/100ML; MG/100ML
INJECTION, SOLUTION INTRAVENOUS CONTINUOUS
Status: DISCONTINUED | OUTPATIENT
Start: 2019-08-13 | End: 2019-08-13 | Stop reason: HOSPADM

## 2019-08-13 RX ORDER — MIDAZOLAM HYDROCHLORIDE 2 MG/2ML
INJECTION, SOLUTION INTRAMUSCULAR; INTRAVENOUS
Status: DISCONTINUED | OUTPATIENT
Start: 2019-08-13 | End: 2019-08-13 | Stop reason: HOSPADM

## 2019-08-13 RX ORDER — SODIUM CHLORIDE 9 MG/ML
INJECTION, SOLUTION INTRAMUSCULAR; INTRAVENOUS; SUBCUTANEOUS
Status: DISCONTINUED | OUTPATIENT
Start: 2019-08-13 | End: 2019-08-13 | Stop reason: HOSPADM

## 2019-08-13 RX ORDER — LIDOCAINE HYDROCHLORIDE 10 MG/ML
INJECTION, SOLUTION EPIDURAL; INFILTRATION; INTRACAUDAL; PERINEURAL
Status: DISCONTINUED | OUTPATIENT
Start: 2019-08-13 | End: 2019-08-13 | Stop reason: HOSPADM

## 2019-08-13 RX ORDER — METHYLPREDNISOLONE ACETATE 80 MG/ML
INJECTION, SUSPENSION INTRA-ARTICULAR; INTRALESIONAL; INTRAMUSCULAR; SOFT TISSUE
Status: DISCONTINUED | OUTPATIENT
Start: 2019-08-13 | End: 2019-08-13 | Stop reason: HOSPADM

## 2019-08-13 RX ADMIN — SODIUM CHLORIDE, SODIUM LACTATE, POTASSIUM CHLORIDE, AND CALCIUM CHLORIDE: .6; .31; .03; .02 INJECTION, SOLUTION INTRAVENOUS at 01:08

## 2019-08-13 NOTE — DISCHARGE SUMMARY
[facility]  Discharge Note  Short Stay    Admit Date: 8/13/2019    Discharge Date: 8/13/2019    Attending Physician: Adan Samuels MD     Discharge Provider: Adan Samuels    Diagnoses:  Active Hospital Problems    Diagnosis  POA    *Cervical radiculopathy [M54.12]  Yes      Resolved Hospital Problems   No resolved problems to display.       Discharged Condition: good    Final Diagnoses: Cervical radiculopathy [M54.12]    Disposition: Home or Self Care    Hospital Course: no complications, uneventful    Outcome of Hospitalization, Treatment, Procedure, or Surgery:  Patient was admitted for outpatient procedure. The patient underwent procedure without complications and are discharged home    Follow up/Patient Instructions:  Follow up as scheduled in Pain Management clinic in 3-4 weeks/Patient has received instructions and follow up date and time    Medications:  Continue previous medications    Discharge Procedure Orders   Call MD for:  temperature >100.4     Call MD for:  severe uncontrolled pain     Call MD for:  redness, tenderness, or signs of infection (pain, swelling, redness, odor or green/yellow discharge around incision site)     Call MD for:  severe persistent headache     No dressing needed         Discharge Procedure Orders (must include Diet, Follow-up, Activity):   Discharge Procedure Orders (must include Diet, Follow-up, Activity)   Call MD for:  temperature >100.4     Call MD for:  severe uncontrolled pain     Call MD for:  redness, tenderness, or signs of infection (pain, swelling, redness, odor or green/yellow discharge around incision site)     Call MD for:  severe persistent headache     No dressing needed

## 2019-08-13 NOTE — DISCHARGE INSTRUCTIONS
Home care instructions   Apply ice pack to the injection site for 20 minutes periods for the first 24 hrs for soreness/discomfort at injection site   DO NOT USE HEAT FOR 24 HOURS  Keep site clean and dry for 24 hours  MAY SHOWER TOMORROW  Do not drive until tomorrow  Take care when walking after YOUR CERVICAL NECK STEROID INJECTION    STEROIDS   May take 10-14 days for full affects.  Avoid strenuous exercises for 2 days    Resume home medication as prescribed today  Resume Aspirin, Plavix, or Coumadin the day after the procedure unless otherwise instructed.    SEE IMMEDIATE MEDICAL HELP FOR:  Severe increase in your usual pain or appearance of new pain  Prolonged or increasing weakness or numbness in the legs or arms  Drainage, redness, active bleeding, or increased swelling at the injection site  Temperature over 100.0 degrees F.  Headache that increases when your head is upright and decreases when you lie flat    CALL 911 OR GO DIRECTLY TO EMERGENCY DEPARTMENT FOR:  Shortness of breath, chest pain, or problems breathing      Recovery After Procedural Sedation (Adult)  You have been given medicine by vein to make you sleep during your surgery. This may have included both a pain medicine and sleeping medicine. Most of the effects have worn off. But you may still have some drowsiness for the next 6 to 8 hours.  Home care  Follow these guidelines when you get home:  · For the next 8 hours, you should be watched by a responsible adult. This person should make sure your condition is not getting worse.  · Don't drink any alcohol for the next 24 hours.  · Don't drive, operate dangerous machinery, or make important business or personal decisions during the next 24 hours.  Note: Your healthcare provider may tell you not to take any medicine by mouth for pain or sleep in the next 4 hours. These medicines may react with the medicines you were given in the hospital. This could cause a much stronger response than  usual.  Follow-up care  Follow up with your healthcare provider if you are not alert and back to your usual level of activity within 12 hours.  When to seek medical advice  Call your healthcare provider right away if any of these occur:  · Drowsiness gets worse  · Weakness or dizziness gets worse  · Repeated vomiting  · You can't be awakened   Date Last Reviewed: 10/18/2016  © 4422-6875 [employer]. [address] [place] [address], [place] [address]. All rights reserved. This information is not intended as a substitute for professional medical care. Always follow your healthcare professional's instructions.

## 2019-08-13 NOTE — OP NOTE
PROCEDURE DATE: 8/13/2019    Procedure: C7-T1 cervical interlaminar epidural steroid injection under utilizing fluoroscopy.    Diagnosis: Cervical Radiculopathy    POSTOP DIAGNOSIS: SAME    Physician: Adan Samuels MD    Medications injected:  Methylprednisone 80mg followed by a slow injection of 4 mL sterile, preservative-free normal saline.    Local anesthetic used: Lidocaine 1%, 4 ml.    Sedation Medications: 2mg versed    Complications:  none    Estimated blood loss: none    Technique:  A time-out was taken to identify patient and procedure prior to starting the procedure.  With the patient laying in a prone position with the neck in a mid-flexed forward position, the area was prepped and draped in the usual sterile fashion using ChloraPrep and a fenestrated drape.  The area was determined under AP fluoroscopic guidance.  Local anesthetic was given using a 25-gauge 1.5 inch needle by raising a wheal and then infiltrating ventrally.  A 3.5 inch 20-gauge Touhy needle was introduced under fluoroscopic guidance to meet the lamina of C7.  The needle was then hinged under the lamina then advanced using loss of resistance technique.  Once the tip of the needle was in the desired position, the contrast dye Omnipaque was injected to determine placement and no uptake.  The steroid was then injected slowly followed by a slow injection of 4 mL of the sterile preservative-free normal saline.  The patient tolerated the procedure well.    The patient was monitored after the procedure and was given post-procedure and discharge instructions to follow at home. The patient was discharged in a stable condition.

## 2019-08-14 VITALS
RESPIRATION RATE: 15 BRPM | SYSTOLIC BLOOD PRESSURE: 122 MMHG | DIASTOLIC BLOOD PRESSURE: 68 MMHG | HEIGHT: 67 IN | TEMPERATURE: 98 F | OXYGEN SATURATION: 92 % | BODY MASS INDEX: 31.71 KG/M2 | HEART RATE: 65 BPM | WEIGHT: 202 LBS

## 2019-08-15 ENCOUNTER — LAB VISIT (OUTPATIENT)
Dept: LAB | Facility: HOSPITAL | Age: 69
End: 2019-08-15
Attending: INTERNAL MEDICINE
Payer: MEDICARE

## 2019-08-15 DIAGNOSIS — D46.9 MDS (MYELODYSPLASTIC SYNDROME): ICD-10-CM

## 2019-08-15 LAB
FERRITIN SERPL-MCNC: 124 NG/ML (ref 12–264)
IRON SATN MFR SERPL: 61 % (ref 20–50)
IRON SERPL-MCNC: 159 UG/DL (ref 30–160)
TOTAL IRON BINDING CAPACITY: 259 UG/DL (ref 265–497)

## 2019-08-15 PROCEDURE — 82728 ASSAY OF FERRITIN: CPT

## 2019-08-15 PROCEDURE — 83540 ASSAY OF IRON: CPT | Mod: PN

## 2019-08-15 PROCEDURE — 83540 ASSAY OF IRON: CPT

## 2019-08-15 PROCEDURE — 82728 ASSAY OF FERRITIN: CPT | Mod: PN

## 2019-08-15 PROCEDURE — 36415 COLL VENOUS BLD VENIPUNCTURE: CPT | Mod: PN

## 2019-08-16 ENCOUNTER — OFFICE VISIT (OUTPATIENT)
Dept: GASTROENTEROLOGY | Facility: CLINIC | Age: 69
End: 2019-08-16
Payer: MEDICARE

## 2019-08-16 VITALS
RESPIRATION RATE: 18 BRPM | HEIGHT: 67 IN | BODY MASS INDEX: 31.31 KG/M2 | DIASTOLIC BLOOD PRESSURE: 77 MMHG | SYSTOLIC BLOOD PRESSURE: 125 MMHG | WEIGHT: 199.5 LBS | HEART RATE: 61 BPM

## 2019-08-16 DIAGNOSIS — K21.9 GERD WITHOUT ESOPHAGITIS: Primary | ICD-10-CM

## 2019-08-16 DIAGNOSIS — Z86.2 HISTORY OF ANEMIA: ICD-10-CM

## 2019-08-16 DIAGNOSIS — Z86.010 HISTORY OF COLON POLYPS: ICD-10-CM

## 2019-08-16 DIAGNOSIS — Z87.19 HISTORY OF CONSTIPATION: ICD-10-CM

## 2019-08-16 PROCEDURE — 99999 PR PBB SHADOW E&M-EST. PATIENT-LVL V: CPT | Mod: PBBFAC,,, | Performed by: NURSE PRACTITIONER

## 2019-08-16 PROCEDURE — 99214 OFFICE O/P EST MOD 30 MIN: CPT | Mod: S$PBB,,, | Performed by: NURSE PRACTITIONER

## 2019-08-16 PROCEDURE — 99214 PR OFFICE/OUTPT VISIT, EST, LEVL IV, 30-39 MIN: ICD-10-PCS | Mod: S$PBB,,, | Performed by: NURSE PRACTITIONER

## 2019-08-16 PROCEDURE — 99999 PR PBB SHADOW E&M-EST. PATIENT-LVL V: ICD-10-PCS | Mod: PBBFAC,,, | Performed by: NURSE PRACTITIONER

## 2019-08-16 PROCEDURE — 99215 OFFICE O/P EST HI 40 MIN: CPT | Mod: PBBFAC,PO | Performed by: NURSE PRACTITIONER

## 2019-08-16 NOTE — PATIENT INSTRUCTIONS

## 2019-08-16 NOTE — PROGRESS NOTES
Subjective:       Patient ID: Charmaine Whalen is a 69 y.o. female, Body mass index is 31.25 kg/m².    Chief Complaint: Follow-up and Other (tightness in chest)      Patient is new to me. Established patient of Dr. Rodriguez and Ana Finney NP.    Patient here for follow-up visit with her . EGD on 6/6/19 showed gastritis. Biopsies negative. Patient feeling well. Occasional has esophageal spasm, relieved with Levsin. On Omeprazole, Zantac, and Carafate for GERD, well controlled. She has a history of constipation. Doing well on Colace and Miralax. Bowel movements once per day. Denies diarrhea, hematochezia, weight loss, or heartburn.    Review of Systems   Constitutional: Negative for appetite change, fever and unexpected weight change.   HENT: Negative for trouble swallowing.    Respiratory: Negative for cough and shortness of breath.    Cardiovascular: Negative for chest pain.   Gastrointestinal: Negative for abdominal pain, blood in stool, constipation, diarrhea, nausea and vomiting.   Genitourinary: Negative for difficulty urinating and dysuria.   Musculoskeletal: Negative for gait problem.   Skin: Negative for rash.   Neurological: Negative for speech difficulty.   Psychiatric/Behavioral: Negative for confusion.       Past Medical History:   Diagnosis Date    Abnormal thallium stress test     2/17 Mild, moderate diagonal disease. Small distal LAD. Mild diastolic dysfunction.    Allergy     Anemia     Anticoagulant long-term use     ASA 81 mg    Anxiety     Atherosclerotic heart disease of native coronary artery with angina pectoris     2/17 [facility] Mild, moderate diagonal disease. Small distal LAD. Mild diastolic dysfunction.    Cataract     OU    Cholelithiasis     Chronic pain     Leg, lower back    Colon cancer     Colon polyp     DDD (degenerative disc disease), lumbar     h/o sciatica    Depression     Encounter for blood transfusion     Esophageal stenosis     Fatty liver      Fibromyalgia     Gastritis     GERD (gastroesophageal reflux disease)     hiatal as well    Hiatal hernia     History of psychiatric hospitalization     1997 at [facility]    History of renal calculi     Hypersomnia with sleep apnea     uses C-pap    Hypothyroidism 1/28/2013    Hashimoto's, with Hypothyroidism    Irritable bowel syndrome     MDS (myelodysplastic syndrome) 1/28/2013    Dr. Xavier     Meningitis     age 3    Myelodysplastic syndrome     bone marrow cancer    Myeloid dysplasia     CORWIN (obstructive sleep apnea)     doesn't use her cpap    PAF (paroxysmal atrial fibrillation) 12/28/2012    PAF in setting of iatrogenic hyperthyroid                                   s/p AFL RFA 2008     Pancytopenia 11/18/2013    PVD (posterior vitreous detachment)     OU    Retinal detachment     HST-OS    Rheumatic fever     as a child    Supraventricular tachycardia     Thrombocytopenia       Past Surgical History:   Procedure Laterality Date    APPENDECTOMY      ATRIAL ABLATION SURGERY      BACK SURGERY      Right Spinal Cord Stimulator    BIOPSY, BONE MARROW N/A 2/22/2012    Performed by [facility]al Fletcher [facility] at [facility]    BONE MARROW BIOPSY      myelodysplastic syndrome    BREAST BIOPSY Left 2016    fibrocystic    CHOLECYSTECTOMY  11/26/2001    Laparascopic    COLON SURGERY  7/2011  (Pe[facility]as?  LKVW)     ~2011  (Peleas?  LKVW)    Colon Resection:  Cecum, for polyp with tubulovillous adenocarcinoma.    COLONOSCOPY  6/2015    Dr. orlando, repeat in 3 years    COLONOSCOPY  10/22/2018    Dr. Orlando: 3 colon polyps removed, redundant colon, diverticulosis, repeat in 3 years for surveillance; biopsy tubular adenomas x 2, 1 hyperplastic polyp    COLONOSCOPY N/A 10/22/2018    Performed by Cooper Orlando Jr., MD at [facility] ENDO    COLONOSCOPY N/A 6/19/2015    Performed by Cooper Orlando Jr., MD at [facility] ENDO    COLONOSCOPY W/ BIOPSIES AND POLYPECTOMY  5/05/2010  [place]    COLONOSCOPY W/ POLYPECTOMY   6/19/2015  [facility]    Four 1 to 4 mm polyps at the hepatic flexure and distal ascending colon.  -Tubulovillous adenoma.   Diverticulosis in the sigmoid colon.  Patent end-to-end ileo-colonic anastomosis in the proximal/mid ascending colon.   Redundant colon.      EGD (ESOPHAGOGASTRODUODENOSCOPY) N/A 6/6/2019    Performed by Cooper Rodriguez Jr., MD at [facility] ENDO    Epidural Steroid Injection      Previous injection 17 years ago with Dr. Pang, Pain Management, 1 with Dr. Samuels    NIC-TRANSFORAMINAL L3 and L5 Left 10/14/2015    Performed by Adan [facility]uels MD at [facility] OR    NIC-TRANSFORAMINAL L4 Left 1/13/2016    [facility] by [facility]an [facility]els MD at [facility] OR    ESOPHAGOGASTRODUODENOSCOPY  11/06/2008    GERD & Gastritis    ESOPHAGOGASTRODUODENOSCOPY (EGD) N/A 12/1/2015    Performed by Cooper Rodriguez Jr., MD at [facility] ENDO    EYE SURGERY      Focal Laser OU    HEART CATH-LEFT-RM # 220 B Left 2/18/2017    [facility] at [facility]    HYSTERECTOMY      ovaries spared    INJECTION, STEROID, SPINE, EPIDURAL, CAUDAL N/A 5/7/2014    Performed by [facility]an Samuels MD at [facility] OR    INJECTION-FACET L5/S1 Bilateral 7/14/2017    Performed by [facility]an Samuels MD at [facility] OR    INJECTION-STEROID-EPIDURAL-CAUDAL N/A 4/25/2017    Per[facility] by [facility]an Samuels MD at [facility] OR    INJECTION-STEROID-EPIDURAL-CAUDAL N/A 9/10/2015    Performed by [facility]an Samuels MD at [facility] OR    INJECTION-STEROID-EPIDURAL-CAUDAL N/A 6/17/2014    Performed by [facility]an Samuels MD at [facility] OR    Injection-steroid-epidural-cervical N/A 8/13/2019    Performed by [facility]an Samuels MD at [facility] OR    INSERTION-STIMULATOR-DORSAL COLUMN N/A 10/23/2017    Performed by [facility]an Sam[facility]els [facility] at [facility] OR    Left heart cath Right 12/7/2018    Performed by De[facility]re Wheatley MD at [facility]    Lumbar Steroid Injection      Pain management    REMOVAL-HARDWARE SPINE N/A 3/16/2016    Performed by Rosalio Aldrich MD at  [facility] OR 2ND [facility]    SCS REMOVAL  3/16/16    [place]    SPINAL FUSION  1999    L4-L5    SPINE SURGERY  3/16/16    L4-5 PEDICLE SCREW & RODS REMOVED/[place]    TRIAL-STIMULATOR-DORSAL COLUMN N/A 9/18/2017    [facility] OR    TUBAL LIGATION      UPPER GASTROINTESTINAL ENDOSCOPY  12/2015    Dr. Rodriguez    UPPER GASTROINTESTINAL ENDOSCOPY  06/06/2019    Dr. Rodriguez; gastritis; esophagus dilated    VAGINAL DELIVERY      times 3      Family History   Problem Relation Age of Onset    COPD Mother     Heart disease Mother     Cancer Mother         Breast    Breast cancer Mother 75    Stroke Father     Heart failure Father     Diabetes Father     Diabetes Sister     Crohn's disease Other     Colon cancer Neg Hx     Ulcerative colitis Neg Hx     Stomach cancer Neg Hx     Esophageal cancer Neg Hx       Wt Readings from Last 10 Encounters:   08/16/19 90.5 kg (199 lb 8.3 oz)   08/15/19 90.3 kg (199 lb 2 oz)   08/13/19 91.6 kg (202 lb)   07/29/19 92.4 kg (203 lb 9.5 oz)   07/16/19 92.9 kg (204 lb 12.9 oz)   07/15/19 91.6 kg (202 lb)   06/14/19 94.3 kg (208 lb)   06/05/19 93 kg (205 lb)   05/27/19 94 kg (207 lb 3.7 oz)   05/03/19 94.3 kg (207 lb 14.3 oz)     Lab Results   Component Value Date    WBC 3.74 (L) 08/12/2019    HGB 9.9 (L) 08/12/2019    HCT 30.7 (L) 08/12/2019     (H) 08/12/2019     (L) 08/12/2019     CMP  Sodium   Date Value Ref Range Status   07/15/2019 143 136 - 145 mmol/L Final     Potassium   Date Value Ref Range Status   07/15/2019 4.4 3.5 - 5.1 mmol/L Final     Chloride   Date Value Ref Range Status   07/15/2019 107 95 - 110 mmol/L Final     CO2   Date Value Ref Range Status   07/15/2019 30 22 - 31 mmol/L Final     Glucose   Date Value Ref Range Status   07/15/2019 123 (H) 70 - 110 mg/dL Final     Comment:     The ADA recommends the following guidelines for fasting glucose:  Normal:       less than 100 mg/dL  Prediabetes:  100 mg/dL to 125  mg/dL  Diabetes:     126 mg/dL or higher       BUN, Bld   Date Value Ref Range Status   07/15/2019 12 7 - 18 mg/dL Final     Creatinine   Date Value Ref Range Status   07/15/2019 0.73 0.50 - 1.40 mg/dL Final     Calcium   Date Value Ref Range Status   07/15/2019 9.1 8.4 - 10.2 mg/dL Final     Total Protein   Date Value Ref Range Status   07/15/2019 6.6 6.0 - 8.4 g/dL Final     Albumin   Date Value Ref Range Status   07/15/2019 4.0 3.5 - 5.2 g/dL Final     Total Bilirubin   Date Value Ref Range Status   07/15/2019 0.5 0.2 - 1.3 mg/dL Final     Alkaline Phosphatase   Date Value Ref Range Status   07/15/2019 98 38 - 145 U/L Final     AST ([place])   Date Value Ref Range Status   11/09/2015 24 14 - 36 U/L Final     AST   Date Value Ref Range Status   07/15/2019 29 14 - 36 U/L Final     ALT   Date Value Ref Range Status   07/15/2019 35 10 - 44 U/L Final     Anion Gap   Date Value Ref Range Status   07/15/2019 6 (L) 8 - 16 mmol/L Final     eGFR if    Date Value Ref Range Status   07/15/2019 >60 >60 mL/min/1.73 m^2 Final     eGFR if non    Date Value Ref Range Status   07/15/2019 >60 >60 mL/min/1.73 m^2 Final     Comment:     Calculation used to obtain the estimated glomerular filtration  rate (eGFR) is the CKD-EPI equation.                  Reviewed prior medical records including radiology report of US abdomen 6/10/19 & endoscopy history (see surgical history).     Objective:      Physical Exam   Constitutional: She is oriented to person, place, and time. She appears well-developed and well-nourished.   HENT:   Head: Normocephalic.   Eyes: Pupils are equal, round, and reactive to light.   Neck: Normal range of motion.   Cardiovascular: Normal rate, regular rhythm and normal heart sounds.   No murmur heard.  Pulmonary/Chest: Breath sounds normal. No respiratory distress. She has no wheezes.   Abdominal: Soft. Bowel sounds are normal. She exhibits no distension. There is no  tenderness.   Well healed scars noted   Musculoskeletal: Normal range of motion.   Neurological: She is alert and oriented to person, place, and time.   Skin: Skin is warm and dry. No rash noted.   Non jaundiced   Psychiatric: She has a normal mood and affect. Her speech is normal.         Assessment:       1. GERD without esophagitis    2. History of constipation    3. History of colon polyps    4. History of anemia           Plan:   All diagnoses and orders for this visit:    GERD without esophagitis   - Continue Omeprazole 40 mg once daily as directed   - Continue Ranitidine 300 mg before bedtime as directed   - Continue Carafate 1 gm BID as directed   - Take PPI in the morning 30-60 minutes before breakfast   - Educated patient on lifestyle modifications to help control/reduce reflux/abdominal pain including: avoid large meals, avoid eating within 2-3 hours of bedtime (avoid late night eating & lying down soon after eating), elevate head of bed if nocturnal symptoms are present, smoking cessation (if current smoker), & weight loss (if overweight).    - Educated to avoid known foods which trigger reflux symptoms & to minimize/avoid high-fat foods, chocolate, caffeine, citrus, alcohol, & tomato products.   - Advised to avoid/limit use of NSAID's, since they can cause GI upset, bleeding, and/or ulcers. If needed, take with food.     History of constipation   - Recommend daily exercise as tolerated, adequate water intake (six 8-oz glasses of water daily), and high fiber diet. OTC fiber supplements are recommended if diet does not reach daily fiber goal (20-30 grams daily), such as Metamucil, Citrucel, or FiberCon (take as directed, separate from other oral medications by >2 hours).   - Continue taking an OTC stool softener such as Colace as directed to avoid hard stools and straining with bowel movements PRN   - Continue taking OTC MiraLax once daily (17g PO) as directed   - If no improvement with above  recommendations, try intermittently dosed Dulcolax OTC as directed (every 3-4  days) PRN to facilitate bowel movements    History of colon polyps   - Next surveillance colonoscopy due 10/2023    History of anemia   - Follow-up with PCP and/or hematology for continued evaluation and management    If no improvement in symptoms or symptoms worsen, call/follow-up at clinic or go to ER

## 2019-08-19 ENCOUNTER — LAB VISIT (OUTPATIENT)
Dept: LAB | Facility: HOSPITAL | Age: 69
End: 2019-08-19
Attending: INTERNAL MEDICINE
Payer: MEDICARE

## 2019-08-19 DIAGNOSIS — D46.9 MDS (MYELODYSPLASTIC SYNDROME): ICD-10-CM

## 2019-08-19 LAB
BASOPHILS # BLD AUTO: 0.02 K/UL (ref 0–0.2)
BASOPHILS NFR BLD: 0.5 % (ref 0–1.9)
DIFFERENTIAL METHOD: ABNORMAL
EOSINOPHIL # BLD AUTO: 0 K/UL (ref 0–0.5)
EOSINOPHIL NFR BLD: 1.1 % (ref 0–8)
ERYTHROCYTE [DISTWIDTH] IN BLOOD BY AUTOMATED COUNT: 13.7 % (ref 11.5–14.5)
HCT VFR BLD AUTO: 31.3 % (ref 37–48.5)
HGB BLD-MCNC: 10.2 G/DL (ref 12–16)
IMM GRANULOCYTES # BLD AUTO: 0.01 K/UL (ref 0–0.04)
IMM GRANULOCYTES NFR BLD AUTO: 0.3 % (ref 0–0.5)
LYMPHOCYTES # BLD AUTO: 1.1 K/UL (ref 1–4.8)
LYMPHOCYTES NFR BLD: 30.9 % (ref 18–48)
MCH RBC QN AUTO: 37.6 PG (ref 27–31)
MCHC RBC AUTO-ENTMCNC: 32.6 G/DL (ref 32–36)
MCV RBC AUTO: 116 FL (ref 82–98)
MONOCYTES # BLD AUTO: 0.4 K/UL (ref 0.3–1)
MONOCYTES NFR BLD: 9.8 % (ref 4–15)
NEUTROPHILS # BLD AUTO: 2.1 K/UL (ref 1.8–7.7)
NEUTROPHILS NFR BLD: 57.4 % (ref 38–73)
NRBC BLD-RTO: 0 /100 WBC
PLATELET # BLD AUTO: 111 K/UL (ref 150–350)
PMV BLD AUTO: 10.2 FL (ref 9.2–12.9)
RBC # BLD AUTO: 2.71 M/UL (ref 4–5.4)
WBC # BLD AUTO: 3.66 K/UL (ref 3.9–12.7)

## 2019-08-19 PROCEDURE — 85025 COMPLETE CBC W/AUTO DIFF WBC: CPT | Mod: PN

## 2019-08-19 PROCEDURE — 85025 COMPLETE CBC W/AUTO DIFF WBC: CPT

## 2019-08-19 PROCEDURE — 36415 COLL VENOUS BLD VENIPUNCTURE: CPT | Mod: PN

## 2019-08-20 DIAGNOSIS — R12 HEARTBURN: ICD-10-CM

## 2019-08-22 RX ORDER — OMEPRAZOLE 40 MG/1
CAPSULE, DELAYED RELEASE ORAL
Qty: 90 CAPSULE | Refills: 3 | Status: SHIPPED | OUTPATIENT
Start: 2019-08-22

## 2019-08-26 ENCOUNTER — LAB VISIT (OUTPATIENT)
Dept: LAB | Facility: HOSPITAL | Age: 69
End: 2019-08-26
Attending: INTERNAL MEDICINE
Payer: MEDICARE

## 2019-08-26 DIAGNOSIS — D46.9 MDS (MYELODYSPLASTIC SYNDROME): ICD-10-CM

## 2019-08-26 LAB
BASOPHILS # BLD AUTO: 0.02 K/UL (ref 0–0.2)
BASOPHILS NFR BLD: 0.6 % (ref 0–1.9)
DIFFERENTIAL METHOD: ABNORMAL
EOSINOPHIL # BLD AUTO: 0.1 K/UL (ref 0–0.5)
EOSINOPHIL NFR BLD: 1.5 % (ref 0–8)
ERYTHROCYTE [DISTWIDTH] IN BLOOD BY AUTOMATED COUNT: 13.8 % (ref 11.5–14.5)
HCT VFR BLD AUTO: 31.8 % (ref 37–48.5)
HGB BLD-MCNC: 10.5 G/DL (ref 12–16)
IMM GRANULOCYTES # BLD AUTO: 0 K/UL (ref 0–0.04)
IMM GRANULOCYTES NFR BLD AUTO: 0 % (ref 0–0.5)
LYMPHOCYTES # BLD AUTO: 1.1 K/UL (ref 1–4.8)
LYMPHOCYTES NFR BLD: 33.2 % (ref 18–48)
MCH RBC QN AUTO: 38.6 PG (ref 27–31)
MCHC RBC AUTO-ENTMCNC: 33 G/DL (ref 32–36)
MCV RBC AUTO: 117 FL (ref 82–98)
MONOCYTES # BLD AUTO: 0.4 K/UL (ref 0.3–1)
MONOCYTES NFR BLD: 11.3 % (ref 4–15)
NEUTROPHILS # BLD AUTO: 1.8 K/UL (ref 1.8–7.7)
NEUTROPHILS NFR BLD: 53.4 % (ref 38–73)
NRBC BLD-RTO: 0 /100 WBC
PLATELET # BLD AUTO: 117 K/UL (ref 150–350)
PMV BLD AUTO: 10.7 FL (ref 9.2–12.9)
RBC # BLD AUTO: 2.72 M/UL (ref 4–5.4)
WBC # BLD AUTO: 3.37 K/UL (ref 3.9–12.7)

## 2019-08-26 PROCEDURE — 85025 COMPLETE CBC W/AUTO DIFF WBC: CPT | Mod: PN

## 2019-08-26 PROCEDURE — 36415 COLL VENOUS BLD VENIPUNCTURE: CPT | Mod: PN

## 2019-08-26 PROCEDURE — 85025 COMPLETE CBC W/AUTO DIFF WBC: CPT

## 2019-09-03 ENCOUNTER — LAB VISIT (OUTPATIENT)
Dept: LAB | Facility: HOSPITAL | Age: 69
End: 2019-09-03
Attending: INTERNAL MEDICINE
Payer: MEDICARE

## 2019-09-03 DIAGNOSIS — D46.9 MDS (MYELODYSPLASTIC SYNDROME): ICD-10-CM

## 2019-09-03 LAB
BASOPHILS # BLD AUTO: 0.01 K/UL (ref 0–0.2)
BASOPHILS NFR BLD: 0.3 % (ref 0–1.9)
DIFFERENTIAL METHOD: ABNORMAL
EOSINOPHIL # BLD AUTO: 0 K/UL (ref 0–0.5)
EOSINOPHIL NFR BLD: 0.9 % (ref 0–8)
ERYTHROCYTE [DISTWIDTH] IN BLOOD BY AUTOMATED COUNT: 13.6 % (ref 11.5–14.5)
HCT VFR BLD AUTO: 30.8 % (ref 37–48.5)
HGB BLD-MCNC: 10.1 G/DL (ref 12–16)
IMM GRANULOCYTES # BLD AUTO: 0.01 K/UL (ref 0–0.04)
IMM GRANULOCYTES NFR BLD AUTO: 0.3 % (ref 0–0.5)
LYMPHOCYTES # BLD AUTO: 1.3 K/UL (ref 1–4.8)
LYMPHOCYTES NFR BLD: 38.1 % (ref 18–48)
MCH RBC QN AUTO: 37.8 PG (ref 27–31)
MCHC RBC AUTO-ENTMCNC: 32.8 G/DL (ref 32–36)
MCV RBC AUTO: 115 FL (ref 82–98)
MONOCYTES # BLD AUTO: 0.3 K/UL (ref 0.3–1)
MONOCYTES NFR BLD: 9.3 % (ref 4–15)
NEUTROPHILS # BLD AUTO: 1.7 K/UL (ref 1.8–7.7)
NEUTROPHILS NFR BLD: 51.1 % (ref 38–73)
NRBC BLD-RTO: 0 /100 WBC
PLATELET # BLD AUTO: 109 K/UL (ref 150–350)
PMV BLD AUTO: 9.7 FL (ref 9.2–12.9)
RBC # BLD AUTO: 2.67 M/UL (ref 4–5.4)
WBC # BLD AUTO: 3.33 K/UL (ref 3.9–12.7)

## 2019-09-03 PROCEDURE — 36415 COLL VENOUS BLD VENIPUNCTURE: CPT | Mod: PN

## 2019-09-03 PROCEDURE — 85025 COMPLETE CBC W/AUTO DIFF WBC: CPT

## 2019-09-03 PROCEDURE — 85025 COMPLETE CBC W/AUTO DIFF WBC: CPT | Mod: PN

## 2019-09-09 ENCOUNTER — LAB VISIT (OUTPATIENT)
Dept: LAB | Facility: HOSPITAL | Age: 69
End: 2019-09-09
Attending: INTERNAL MEDICINE
Payer: MEDICARE

## 2019-09-09 DIAGNOSIS — D46.9 MDS (MYELODYSPLASTIC SYNDROME): ICD-10-CM

## 2019-09-09 LAB
ALBUMIN SERPL BCP-MCNC: 4 G/DL (ref 3.5–5.2)
ALP SERPL-CCNC: 82 U/L (ref 38–145)
ALT SERPL W/O P-5'-P-CCNC: 37 U/L (ref 10–44)
ANION GAP SERPL CALC-SCNC: 6 MMOL/L (ref 8–16)
AST SERPL-CCNC: 26 U/L (ref 14–36)
BASOPHILS # BLD AUTO: 0.02 K/UL (ref 0–0.2)
BASOPHILS NFR BLD: 0.6 % (ref 0–1.9)
BILIRUB SERPL-MCNC: 0.3 MG/DL (ref 0.2–1.3)
BUN SERPL-MCNC: 12 MG/DL (ref 7–18)
CALCIUM SERPL-MCNC: 9.1 MG/DL (ref 8.4–10.2)
CHLORIDE SERPL-SCNC: 106 MMOL/L (ref 95–110)
CO2 SERPL-SCNC: 30 MMOL/L (ref 22–31)
CREAT SERPL-MCNC: 0.74 MG/DL (ref 0.5–1.4)
DIFFERENTIAL METHOD: ABNORMAL
EOSINOPHIL # BLD AUTO: 0 K/UL (ref 0–0.5)
EOSINOPHIL NFR BLD: 1.2 % (ref 0–8)
ERYTHROCYTE [DISTWIDTH] IN BLOOD BY AUTOMATED COUNT: 13.6 % (ref 11.5–14.5)
EST. GFR  (AFRICAN AMERICAN): >60 ML/MIN/1.73 M^2
EST. GFR  (NON AFRICAN AMERICAN): >60 ML/MIN/1.73 M^2
GLUCOSE SERPL-MCNC: 88 MG/DL (ref 70–110)
HCT VFR BLD AUTO: 31 % (ref 37–48.5)
HGB BLD-MCNC: 10.2 G/DL (ref 12–16)
IMM GRANULOCYTES # BLD AUTO: 0.01 K/UL (ref 0–0.04)
IMM GRANULOCYTES NFR BLD AUTO: 0.3 % (ref 0–0.5)
LDH SERPL L TO P-CCNC: 453 U/L (ref 313–618)
LYMPHOCYTES # BLD AUTO: 1.3 K/UL (ref 1–4.8)
LYMPHOCYTES NFR BLD: 41 % (ref 18–48)
MCH RBC QN AUTO: 38.3 PG (ref 27–31)
MCHC RBC AUTO-ENTMCNC: 32.9 G/DL (ref 32–36)
MCV RBC AUTO: 117 FL (ref 82–98)
MONOCYTES # BLD AUTO: 0.3 K/UL (ref 0.3–1)
MONOCYTES NFR BLD: 8.9 % (ref 4–15)
NEUTROPHILS # BLD AUTO: 1.6 K/UL (ref 1.8–7.7)
NEUTROPHILS NFR BLD: 48 % (ref 38–73)
NRBC BLD-RTO: 0 /100 WBC
PLATELET # BLD AUTO: 116 K/UL (ref 150–350)
PMV BLD AUTO: 10.4 FL (ref 9.2–12.9)
POTASSIUM SERPL-SCNC: 4.4 MMOL/L (ref 3.5–5.1)
PROT SERPL-MCNC: 6.5 G/DL (ref 6–8.4)
RBC # BLD AUTO: 2.66 M/UL (ref 4–5.4)
SODIUM SERPL-SCNC: 142 MMOL/L (ref 136–145)
WBC # BLD AUTO: 3.27 K/UL (ref 3.9–12.7)

## 2019-09-09 PROCEDURE — 83615 LACTATE (LD) (LDH) ENZYME: CPT

## 2019-09-09 PROCEDURE — 36415 COLL VENOUS BLD VENIPUNCTURE: CPT | Mod: PN

## 2019-09-09 PROCEDURE — 85025 COMPLETE CBC W/AUTO DIFF WBC: CPT

## 2019-09-09 PROCEDURE — 83615 LACTATE (LD) (LDH) ENZYME: CPT | Mod: PN

## 2019-09-09 PROCEDURE — 80053 COMPREHEN METABOLIC PANEL: CPT | Mod: PN

## 2019-09-09 PROCEDURE — 85025 COMPLETE CBC W/AUTO DIFF WBC: CPT | Mod: PN

## 2019-09-09 PROCEDURE — 80053 COMPREHEN METABOLIC PANEL: CPT

## 2019-09-10 ENCOUNTER — OFFICE VISIT (OUTPATIENT)
Dept: PAIN MEDICINE | Facility: CLINIC | Age: 69
End: 2019-09-10
Payer: MEDICARE

## 2019-09-10 VITALS
RESPIRATION RATE: 18 BRPM | BODY MASS INDEX: 30.92 KG/M2 | WEIGHT: 197.44 LBS | HEART RATE: 64 BPM | SYSTOLIC BLOOD PRESSURE: 92 MMHG | DIASTOLIC BLOOD PRESSURE: 55 MMHG | OXYGEN SATURATION: 97 % | TEMPERATURE: 99 F

## 2019-09-10 DIAGNOSIS — M50.30 DDD (DEGENERATIVE DISC DISEASE), CERVICAL: Primary | ICD-10-CM

## 2019-09-10 DIAGNOSIS — M54.16 LUMBAR RADICULITIS: ICD-10-CM

## 2019-09-10 DIAGNOSIS — M51.36 DDD (DEGENERATIVE DISC DISEASE), LUMBAR: ICD-10-CM

## 2019-09-10 DIAGNOSIS — G89.4 CHRONIC PAIN DISORDER: ICD-10-CM

## 2019-09-10 PROCEDURE — 99215 OFFICE O/P EST HI 40 MIN: CPT | Mod: PBBFAC,PN | Performed by: PHYSICIAN ASSISTANT

## 2019-09-10 PROCEDURE — 99999 PR PBB SHADOW E&M-EST. PATIENT-LVL V: CPT | Mod: PBBFAC,,, | Performed by: PHYSICIAN ASSISTANT

## 2019-09-10 PROCEDURE — 99213 PR OFFICE/OUTPT VISIT, EST, LEVL III, 20-29 MIN: ICD-10-PCS | Mod: S$PBB,,, | Performed by: PHYSICIAN ASSISTANT

## 2019-09-10 PROCEDURE — 99999 PR PBB SHADOW E&M-EST. PATIENT-LVL V: ICD-10-PCS | Mod: PBBFAC,,, | Performed by: PHYSICIAN ASSISTANT

## 2019-09-10 PROCEDURE — 99213 OFFICE O/P EST LOW 20 MIN: CPT | Mod: S$PBB,,, | Performed by: PHYSICIAN ASSISTANT

## 2019-09-11 NOTE — PROGRESS NOTES
This note was completed with dictation software and grammatical errors may exist.    CC: back and leg pain    HPI: The patient is a 69 year-old Female with a history of colon CA, sleep apnea, anemia, depression and postlaminectomy syndrome now with myelodysplastic syndrome who presents in referral from Dr. Villanueva.  She is status post C7-T1 interlaminar epidural steroid injection on 08/13/2019 with 0% relief.  She complains neck pain radiating to the left greater than right trapezius muscles and left upper arm.  She also complains of low back pain radiating to both of her legs at this time.  She states her spinal cord stimulator helps some.  She denies weakness or numbness but reports being balance at times.  She attributes this to feeling dizzy and she is going to discuss this with her physician.  She denies bladder or bowel incontinence.    Pain intervention history:  She is status post caudal epidural steroid injection on 5/7/14 with 90% relief lasting 3 days, then the pain slowly came back over 7 days.  She is status post caudal epidural steroid injection on 6/17/14 with 70% relief.  She is status post caudal epidural steroid injection on 9/10/15 with almost complete relief lasting 5-6 days.  She is status post left L3 and L5 transforaminal epidural steroid injections on 10/14/15 with 0% relief.  She is status post left L4 transforaminal epidural steroid injection on 1/13/16 with about 90% relief lasting one week.  She underwent removal spinal cord stimulator and L4/5 hardware on 3/16/16 with Dr. Aldrich.  She is status post caudal epidural steroid injection on 4/25/17 with 0% relief.  She is status post bilateral L5/S1 facet joint injections on 7/14/17 with 0% relief.  She is status post C7-T1 interlaminar epidural steroid injection on 08/13/2019 with 0% relief.      ROS:She reports fatigue, joint pain.  Balance of review of systems is negative.    Medical, surgical, family and social history reviewed  elsewhere in record.    Medications/Allergies: See med card    Vitals:    09/10/19 1328   BP: (!) 92/55   Pulse: 64   Resp: 18   Temp: 98.5 °F (36.9 °C)   TempSrc: Oral   SpO2: 97%   Weight: 89.5 kg (197 lb 6.8 oz)   PainSc: 0-No pain         Physical exam:  Gen: A and O x3, pleasant, well-groomed  Skin: No rashes or obvious lesions  HEENT: PERRLA  CVS: Regular rate and rhythm, normal S1 and S2, no murmurs.  Resp: Clear to auscultation bilaterally, no wheezes or rales.  Abdomen: Soft, NT/ND, normal bowel sounds present.  Musculoskeletal: Nonantalgic gait.    Neuro:  Upper extremities: 5/5 strength bilaterally   Lower extremities: 5/5 strength bilaterally  Reflexes: Brachioradialis 2+, Bicep 2+, Tricep 2+. Patellar 0+, Achilles 2+ bilaterally.  Sensory: Intact and symmetrical to light touch and pinprick in C2-T1 dermatomes bilaterally. Intact and symmetrical to light touch and pinprick in L2-S1 dermatomes bilaterally.    Cervical Spine:  Cervical spine: ROM is full in flexion, extension and lateral rotation with increased left neck pain during each maneuver.  Spurling's maneuver causes no neck pain to either side.  Myofascial exam:  Moderate tenderness to palpation to the left trapezius muscle.    Lumbar spine exam:  Range of motion is mildly reduced with flexion and extension with increased low back pain during each maneuver.  Luca's test causes back pain only.  Straight leg raise causes low back and left lower leg pain.  Internal and external rotation the hip is negative bilaterally.  Myofascial exam: Mild tenderness to palpation to the lumbar paraspinous muscles.    Imaging:  MRI LUMBAR SPINE W/WO CONTRAST ON 8/22/06  1. MINIMAL L3-4 DISC BULGE CAUSING MINIMAL THECAL SAC COMPRESSION, AND THERE IS ALSO MILD BILATERAL DEGENERATIVE FACET ARTHROSIS.   2. L4-5 DISCECTOMY AND L5 LAMINECTOMY WITH NO EVIDENCE OF A SIGNIFICANT SPINAL CANAL COMPROMISE. THERE IS MILD BILATERAL FORAMINAL NARROWING AT THE L4-5 LEVEL   3.  DEGENERATION AND MILD BULGING OF THE L5-S1 DISC WITH ASSOCIATED MILD THECAL SAC COMPRESSION.   4. ROUNDED MASS ARISING FROM THE LEFT KIDNEY WHICH MOST LIKELY REPRESENTS A CYST. ULTRASOUND IS RECOMMENDED TO EXCLUDE A RENAL NEOPLASM.    2/11/14 x-ray tib-fib:  No obvious right tibial or fibular fracture is noted. No worrisome abnormality is noted. A linear metallic density is noted on the lateral view overlying the forefoot dorsally near the navicular cuneiform articulation. This could relate to a foreign body or overlying density relating to clothing. Please clinically correlate a small quadriceps patellar spur is noted.    10/1/15 CT lumbar spine  T12/L1: Mild disk bulging is evident and degenerative facet changes are noted at this level. The canal and foramina are grossly intact.  L1/L2: Mild annular disk bulge and mild degenerative facet changes produce only minimal canal and foraminal distortion to  L2/L3: Degenerative facet changes noted in the mild annular disk and facet disease.  L3/L4: Posterior canal ligamentous hypertrophy and degenerative facet changes are present. There is moderate annular disk bulging. This combines to produce mild canal and left greater than right foraminal narrowing. This is slightly worsened from the previous exam.  L4/L5: The canal is grossly intact as the surgical level. Degenerative facet changes are noted bilaterally.  L5/S1: Moderate degenerative facet changes noted bilaterally and there is suggestion of disk bulging with left greater than right foraminal narrowing.    11/16/16 MRI lumbar spine with and without contrast  There have been prior laminectomies at L4 and L5, with interbody spacer present at L4-5.  Vertebral body height and alignment are within normal limits.  Marrow signal is heterogeneous.  There is moderate marrow enhancement within the L4 and L5 vertebral bodies centered about the disc space which is likely reactive.  The conus terminates at T12-L1.  Disc  desiccation is present throughout the lumbar spine.  Mild loss of disc height is present at the L5-S1 level.  L1-L2:  No disc herniation. No spinal canal or neuroforaminal narrowing.  L2-L3:  No disc herniation. No spinal canal or neuroforaminal narrowing.  L3-L4:  There is mild diffuse disc bulging and moderate facet arthropathy with significant thickening of the ligament of flavum.  The findings contribute to mild spinal canal narrowing without significant neuroforaminal narrowing.  L4-L5:  Moderate bilateral neuroforaminal narrowing is present which appears predominantly due to facet arthropathy, with some contribution from enhancing scar tissue formation within the neuroforamen on the right.  No spinal canal narrowing.  L5-S1:  There is mild broad-based posterior bulging of the disc, abdomen and moderate bilateral facet arthropathy.  There is resultant mild bilateral neuroforaminal narrowing.    03/28/2018 CT cervical spine  Multiple computerized images of the cervical spine were obtained in axial projection using thin slices.  No intravenous contrast was administered.  Study was viewed at soft tissue and bone window settings.  Reconstructions were done in coronal and sagittal planes.  There is no apparent fracture or subluxation.  The prevertebral soft tissue space is not widened.  There are degenerative changes present involving mainly the C5-C6 and C6-C7 levels. Findings consistent of vertebral body osteophyte formation and narrowing of the disc spaces.      Assessment:  The patient is a 69year-old Female with a history of colon CA, sleep apnea, anemia, depression and postlaminectomy syndrome now with myelodysplastic syndrome who presents in referral from Dr. Villanueva.  1. DDD (degenerative disc disease), cervical     2. DDD (degenerative disc disease), lumbar     3. Chronic pain disorder     4. Lumbar radiculitis         Plan:  1.  Unfortunately she did not have relief following the cervical NIC.  We  discussed considering cervical medial branch blocks and if unsuccessful updating her CT cervical spine in considering to see Neurosurgery.  We also discussed updating her lumbar spine CT for further evaluation of her low back and leg pain. She states currently she is not interested in seeking any further treatment.  We also discussed her balance and she is not interested in any physical therapy at this time.  She will follow-up as needed.    Greater than 50% of this 20 min visit was spent counseling patient.

## 2019-09-16 ENCOUNTER — INFUSION (OUTPATIENT)
Dept: INFUSION THERAPY | Facility: HOSPITAL | Age: 69
End: 2019-09-16
Attending: INTERNAL MEDICINE
Payer: MEDICARE

## 2019-09-16 VITALS
DIASTOLIC BLOOD PRESSURE: 71 MMHG | SYSTOLIC BLOOD PRESSURE: 109 MMHG | HEART RATE: 64 BPM | RESPIRATION RATE: 16 BRPM | TEMPERATURE: 99 F

## 2019-09-16 DIAGNOSIS — Z23 NEED FOR RABIES VACCINATION: ICD-10-CM

## 2019-09-16 DIAGNOSIS — D46.9 MDS (MYELODYSPLASTIC SYNDROME): Primary | ICD-10-CM

## 2019-09-16 PROCEDURE — 63600175 PHARM REV CODE 636 W HCPCS: Mod: PN | Performed by: INTERNAL MEDICINE

## 2019-09-16 PROCEDURE — 96372 THER/PROPH/DIAG INJ SC/IM: CPT | Mod: PN

## 2019-09-16 RX ADMIN — EPOETIN ALFA-EPBX 40000 UNITS: 40000 INJECTION, SOLUTION INTRAVENOUS; SUBCUTANEOUS at 10:09

## 2019-09-23 ENCOUNTER — INFUSION (OUTPATIENT)
Dept: INFUSION THERAPY | Facility: HOSPITAL | Age: 69
End: 2019-09-23
Attending: INTERNAL MEDICINE
Payer: MEDICARE

## 2019-09-23 VITALS
HEART RATE: 65 BPM | SYSTOLIC BLOOD PRESSURE: 104 MMHG | RESPIRATION RATE: 16 BRPM | TEMPERATURE: 98 F | DIASTOLIC BLOOD PRESSURE: 72 MMHG

## 2019-09-23 DIAGNOSIS — Z23 NEED FOR RABIES VACCINATION: ICD-10-CM

## 2019-09-23 DIAGNOSIS — D46.9 MDS (MYELODYSPLASTIC SYNDROME): Primary | ICD-10-CM

## 2019-09-23 PROCEDURE — 96372 THER/PROPH/DIAG INJ SC/IM: CPT | Mod: PN

## 2019-09-23 PROCEDURE — 63600175 PHARM REV CODE 636 W HCPCS: Mod: PN | Performed by: INTERNAL MEDICINE

## 2019-09-23 RX ADMIN — EPOETIN ALFA-EPBX 40000 UNITS: 40000 INJECTION, SOLUTION INTRAVENOUS; SUBCUTANEOUS at 10:09

## 2019-09-25 DIAGNOSIS — F33.0 MDD (MAJOR DEPRESSIVE DISORDER), RECURRENT EPISODE, MILD: ICD-10-CM

## 2019-09-25 DIAGNOSIS — E03.9 HYPOTHYROIDISM, UNSPECIFIED TYPE: ICD-10-CM

## 2019-09-25 RX ORDER — LEVOTHYROXINE SODIUM 100 UG/1
TABLET ORAL
Qty: 90 TABLET | Refills: 0 | Status: SHIPPED | OUTPATIENT
Start: 2019-09-25 | End: 2019-12-24

## 2019-09-28 RX ORDER — BUPROPION HYDROCHLORIDE 300 MG/1
TABLET ORAL
Qty: 90 TABLET | Refills: 0 | OUTPATIENT
Start: 2019-09-28

## 2019-09-30 ENCOUNTER — INFUSION (OUTPATIENT)
Dept: INFUSION THERAPY | Facility: HOSPITAL | Age: 69
End: 2019-09-30
Attending: INTERNAL MEDICINE
Payer: MEDICARE

## 2019-09-30 VITALS
HEART RATE: 64 BPM | RESPIRATION RATE: 16 BRPM | SYSTOLIC BLOOD PRESSURE: 120 MMHG | TEMPERATURE: 98 F | DIASTOLIC BLOOD PRESSURE: 77 MMHG

## 2019-09-30 DIAGNOSIS — Z23 NEED FOR RABIES VACCINATION: ICD-10-CM

## 2019-09-30 DIAGNOSIS — D46.9 MDS (MYELODYSPLASTIC SYNDROME): Primary | ICD-10-CM

## 2019-09-30 PROCEDURE — 63600175 PHARM REV CODE 636 W HCPCS: Mod: PN | Performed by: INTERNAL MEDICINE

## 2019-09-30 PROCEDURE — 96372 THER/PROPH/DIAG INJ SC/IM: CPT | Mod: PN

## 2019-09-30 RX ADMIN — EPOETIN ALFA-EPBX 40000 UNITS: 40000 INJECTION, SOLUTION INTRAVENOUS; SUBCUTANEOUS at 10:09

## 2019-10-07 ENCOUNTER — LAB VISIT (OUTPATIENT)
Dept: LAB | Facility: HOSPITAL | Age: 69
End: 2019-10-07
Attending: INTERNAL MEDICINE
Payer: MEDICARE

## 2019-10-07 DIAGNOSIS — D46.9 MDS (MYELODYSPLASTIC SYNDROME): ICD-10-CM

## 2019-10-07 LAB
ALBUMIN SERPL BCP-MCNC: 4.3 G/DL (ref 3.5–5.2)
ALP SERPL-CCNC: 80 U/L (ref 38–145)
ALT SERPL W/O P-5'-P-CCNC: 21 U/L (ref 10–44)
ANION GAP SERPL CALC-SCNC: 6 MMOL/L (ref 8–16)
AST SERPL-CCNC: 25 U/L (ref 14–36)
BASOPHILS # BLD AUTO: 0.03 K/UL (ref 0–0.2)
BASOPHILS NFR BLD: 1 % (ref 0–1.9)
BILIRUB SERPL-MCNC: 0.5 MG/DL (ref 0.2–1.3)
BUN SERPL-MCNC: 16 MG/DL (ref 7–18)
CALCIUM SERPL-MCNC: 9.1 MG/DL (ref 8.4–10.2)
CHLORIDE SERPL-SCNC: 107 MMOL/L (ref 95–110)
CO2 SERPL-SCNC: 30 MMOL/L (ref 22–31)
CREAT SERPL-MCNC: 0.68 MG/DL (ref 0.5–1.4)
DIFFERENTIAL METHOD: ABNORMAL
EOSINOPHIL # BLD AUTO: 0.1 K/UL (ref 0–0.5)
EOSINOPHIL NFR BLD: 1.6 % (ref 0–8)
ERYTHROCYTE [DISTWIDTH] IN BLOOD BY AUTOMATED COUNT: 14.4 % (ref 11.5–14.5)
EST. GFR  (AFRICAN AMERICAN): >60 ML/MIN/1.73 M^2
EST. GFR  (NON AFRICAN AMERICAN): >60 ML/MIN/1.73 M^2
GLUCOSE SERPL-MCNC: 79 MG/DL (ref 70–110)
HCT VFR BLD AUTO: 33.6 % (ref 37–48.5)
HGB BLD-MCNC: 10.9 G/DL (ref 12–16)
IMM GRANULOCYTES # BLD AUTO: 0.01 K/UL (ref 0–0.04)
IMM GRANULOCYTES NFR BLD AUTO: 0.3 % (ref 0–0.5)
LDH SERPL L TO P-CCNC: 460 U/L (ref 313–618)
LYMPHOCYTES # BLD AUTO: 1.1 K/UL (ref 1–4.8)
LYMPHOCYTES NFR BLD: 36.7 % (ref 18–48)
MCH RBC QN AUTO: 38.2 PG (ref 27–31)
MCHC RBC AUTO-ENTMCNC: 32.4 G/DL (ref 32–36)
MCV RBC AUTO: 118 FL (ref 82–98)
MONOCYTES # BLD AUTO: 0.3 K/UL (ref 0.3–1)
MONOCYTES NFR BLD: 9.6 % (ref 4–15)
NEUTROPHILS # BLD AUTO: 1.6 K/UL (ref 1.8–7.7)
NEUTROPHILS NFR BLD: 50.8 % (ref 38–73)
NRBC BLD-RTO: 0 /100 WBC
PLATELET # BLD AUTO: 112 K/UL (ref 150–350)
PMV BLD AUTO: 10.2 FL (ref 9.2–12.9)
POTASSIUM SERPL-SCNC: 4.3 MMOL/L (ref 3.5–5.1)
PROT SERPL-MCNC: 7 G/DL (ref 6–8.4)
RBC # BLD AUTO: 2.85 M/UL (ref 4–5.4)
SODIUM SERPL-SCNC: 143 MMOL/L (ref 136–145)
WBC # BLD AUTO: 3.11 K/UL (ref 3.9–12.7)

## 2019-10-07 PROCEDURE — 36415 COLL VENOUS BLD VENIPUNCTURE: CPT | Mod: PN

## 2019-10-07 PROCEDURE — 83615 LACTATE (LD) (LDH) ENZYME: CPT

## 2019-10-07 PROCEDURE — 83615 LACTATE (LD) (LDH) ENZYME: CPT | Mod: PN

## 2019-10-07 PROCEDURE — 80053 COMPREHEN METABOLIC PANEL: CPT | Mod: PN

## 2019-10-07 PROCEDURE — 80053 COMPREHEN METABOLIC PANEL: CPT

## 2019-10-07 PROCEDURE — 85025 COMPLETE CBC W/AUTO DIFF WBC: CPT

## 2019-10-07 PROCEDURE — 85025 COMPLETE CBC W/AUTO DIFF WBC: CPT | Mod: PN

## 2019-10-09 RX ORDER — HYDROCODONE BITARTRATE AND ACETAMINOPHEN 7.5; 325 MG/1; MG/1
1 TABLET ORAL DAILY PRN
Qty: 30 TABLET | Refills: 0 | Status: SHIPPED | OUTPATIENT
Start: 2019-10-09

## 2019-10-17 ENCOUNTER — OFFICE VISIT (OUTPATIENT)
Dept: DERMATOLOGY | Facility: CLINIC | Age: 69
End: 2019-10-17
Payer: MEDICARE

## 2019-10-17 VITALS — WEIGHT: 199.06 LBS | HEIGHT: 67 IN | RESPIRATION RATE: 18 BRPM | BODY MASS INDEX: 31.24 KG/M2

## 2019-10-17 DIAGNOSIS — Z85.828 PERSONAL HISTORY OF MALIGNANT NEOPLASM OF SKIN: ICD-10-CM

## 2019-10-17 DIAGNOSIS — L82.1 SEBORRHEIC KERATOSES: Primary | ICD-10-CM

## 2019-10-17 DIAGNOSIS — D22.9 JUNCTIONAL NEVUS: ICD-10-CM

## 2019-10-17 DIAGNOSIS — L90.5 SCAR: ICD-10-CM

## 2019-10-17 PROCEDURE — 99214 OFFICE O/P EST MOD 30 MIN: CPT | Mod: S$PBB,,, | Performed by: DERMATOLOGY

## 2019-10-17 PROCEDURE — 99213 OFFICE O/P EST LOW 20 MIN: CPT | Mod: PBBFAC,PO | Performed by: DERMATOLOGY

## 2019-10-17 PROCEDURE — 99999 PR PBB SHADOW E&M-EST. PATIENT-LVL III: ICD-10-PCS | Mod: PBBFAC,,, | Performed by: DERMATOLOGY

## 2019-10-17 PROCEDURE — 99999 PR PBB SHADOW E&M-EST. PATIENT-LVL III: CPT | Mod: PBBFAC,,, | Performed by: DERMATOLOGY

## 2019-10-17 PROCEDURE — 99214 PR OFFICE/OUTPT VISIT, EST, LEVL IV, 30-39 MIN: ICD-10-PCS | Mod: S$PBB,,, | Performed by: DERMATOLOGY

## 2019-10-17 NOTE — PROGRESS NOTES
Subjective:       Patient ID:  Charmaine Whalen is a 69 y.o. female who presents for   Chief Complaint   Patient presents with    Mole     HPI  68 yo F presents with her  for skin check  Location: body  Duration: yearly  Symptoms: asymptomatic; some spots are occasionally itchy  Relieving factors/Previous treatments: NA    Derm Hx: NMSC of R temple    Review of Systems   Skin: Positive for wears hat. Negative for sun sensitivity and daily sunscreen use.   Hematologic/Lymphatic: Bruises/bleeds easily.      Past Medical History:   Diagnosis Date    Abnormal thallium stress test     2/17 Mild, moderate diagonal disease. Small distal LAD. Mild diastolic dysfunction.    Allergy     Anemia     Anticoagulant long-term use     ASA 81 mg    Anxiety     Atherosclerotic heart disease of native coronary artery with angina pectoris     2/17 [facility] Mild, moderate diagonal disease. Small distal LAD. Mild diastolic dysfunction.    Cataract     OU    Cholelithiasis     Chronic pain     Leg, lower back    Colon cancer     Colon polyp     DDD (degenerative disc disease), lumbar     h/o sciatica    Depression     Encounter for blood transfusion     Esophageal stenosis     Fatty liver     Fibromyalgia     Gastritis     GERD (gastroesophageal reflux disease)     hiatal as well    Hiatal hernia     History of psychiatric hospitalization     1997 at [place]    History of renal calculi     Hypersomnia with sleep apnea     uses C-pap    Hypothyroidism 1/28/2013    Hashimoto's, with Hypothyroidism    Irritable bowel syndrome     MDS (myelodysplastic syndrome) 1/28/2013    Dr. Xavier     Meningitis     age 3    Myelodysplastic syndrome     bone marrow cancer    Myeloid dysplasia     CORWIN (obstructive sleep apnea)     doesn't use her cpap    PAF (paroxysmal atrial fibrillation) 12/28/2012    PAF in setting of iatrogenic hyperthyroid                                   s/p AFL RFA 2008     Pancytopenia  11/18/2013    PVD (posterior vitreous detachment)     OU    Retinal detachment     HST-OS    Rheumatic fever     as a child    Supraventricular tachycardia     Thrombocytopenia        Objective:    Physical Exam   Constitutional: She appears well-developed and well-nourished.   HENT:   Mouth/Throat: Lips normal.    Eyes: Lids are normal.    Neurological: She is alert and oriented to person, place, and time.   Psychiatric: She has a normal mood and affect.   Skin:   Areas Examined (abnormalities noted in diagram):   Scalp / Hair Palpated and Inspected  Head / Face Inspection Performed  Neck Inspection Performed  Chest / Axilla Inspection Performed  Abdomen Inspection Performed  Back Inspection Performed  RUE Inspected  LUE Inspection Performed                   Diagram Legend     Erythematous scaling macule/papule c/w actinic keratosis       Vascular papule c/w angioma      Pigmented verrucoid papule/plaque c/w seborrheic keratosis      Yellow umbilicated papule c/w sebaceous hyperplasia      Irregularly shaped tan macule c/w lentigo     1-2 mm smooth white papules consistent with Milia      Movable subcutaneous cyst with punctum c/w epidermal inclusion cyst      Subcutaneous movable cyst c/w pilar cyst      Firm pink to brown papule c/w dermatofibroma      Pedunculated fleshy papule(s) c/w skin tag(s)      Evenly pigmented macule c/w junctional nevus     Mildly variegated pigmented, slightly irregular-bordered macule c/w mildly atypical nevus      Flesh colored to evenly pigmented papule c/w intradermal nevus       Pink pearly papule/plaque c/w basal cell carcinoma      Erythematous hyperkeratotic cursted plaque c/w SCC      Surgical scar with no sign of skin cancer recurrence      Open and closed comedones      Inflammatory papules and pustules      Verrucoid papule consistent consistent with wart     Erythematous eczematous patches and plaques     Dystrophic onycholytic nail with subungual debris c/w  onychomycosis     Umbilicated papule    Erythematous-base heme-crusted tan verrucoid plaque consistent with inflamed seborrheic keratosis     Erythematous Silvery Scaling Plaque c/w Psoriasis     See annotation      Assessment / Plan:        Seborrheic keratoses  These are benign inherited growths without a malignant potential. Reassurance given to patient. No treatment is necessary.     Junctional nevus  Reassurance that her nevi appear benign with regular and consistent pigment pattern on dermoscopy    Personal history of malignant neoplasm of skin  Area(s) of previous NMSC evaluated with no signs of recurrence.    Upper body skin examination performed today including at least 12 points as noted in physical examination. No lesions suspicious for malignancy noted.           Follow up in about 1 year (around 10/17/2020).

## 2019-10-19 ENCOUNTER — PATIENT MESSAGE (OUTPATIENT)
Dept: PSYCHIATRY | Facility: CLINIC | Age: 69
End: 2019-10-19

## 2019-10-23 RX ORDER — SUCRALFATE 1 G/1
TABLET ORAL
Qty: 120 TABLET | Refills: 5 | Status: SHIPPED | OUTPATIENT
Start: 2019-10-23

## 2019-10-25 DIAGNOSIS — F33.0 MDD (MAJOR DEPRESSIVE DISORDER), RECURRENT EPISODE, MILD: ICD-10-CM

## 2019-10-25 DIAGNOSIS — F41.1 GAD (GENERALIZED ANXIETY DISORDER): ICD-10-CM

## 2019-10-29 RX ORDER — FLUOXETINE HYDROCHLORIDE 40 MG/1
CAPSULE ORAL
Qty: 90 CAPSULE | Refills: 0 | Status: SHIPPED | OUTPATIENT
Start: 2019-10-29 | End: 2020-01-29

## 2019-10-31 DIAGNOSIS — F41.1 GAD (GENERALIZED ANXIETY DISORDER): ICD-10-CM

## 2019-10-31 DIAGNOSIS — G47.09 OTHER INSOMNIA: ICD-10-CM

## 2019-11-02 RX ORDER — TRAZODONE HYDROCHLORIDE 100 MG/1
TABLET ORAL
Qty: 180 TABLET | Refills: 1 | Status: SHIPPED | OUTPATIENT
Start: 2019-11-02

## 2019-11-04 ENCOUNTER — LAB VISIT (OUTPATIENT)
Dept: LAB | Facility: HOSPITAL | Age: 69
End: 2019-11-04
Attending: INTERNAL MEDICINE
Payer: MEDICARE

## 2019-11-04 DIAGNOSIS — D46.9 MDS (MYELODYSPLASTIC SYNDROME): ICD-10-CM

## 2019-11-04 LAB
ALBUMIN SERPL BCP-MCNC: 3.9 G/DL (ref 3.5–5.2)
ALP SERPL-CCNC: 72 U/L (ref 38–145)
ALT SERPL W/O P-5'-P-CCNC: 21 U/L (ref 10–44)
ANION GAP SERPL CALC-SCNC: 4 MMOL/L (ref 8–16)
AST SERPL-CCNC: 25 U/L (ref 14–36)
BASOPHILS # BLD AUTO: 0.02 K/UL (ref 0–0.2)
BASOPHILS NFR BLD: 0.7 % (ref 0–1.9)
BILIRUB SERPL-MCNC: 0.3 MG/DL (ref 0.2–1.3)
BUN SERPL-MCNC: 14 MG/DL (ref 7–18)
CALCIUM SERPL-MCNC: 8.8 MG/DL (ref 8.4–10.2)
CHLORIDE SERPL-SCNC: 108 MMOL/L (ref 95–110)
CO2 SERPL-SCNC: 30 MMOL/L (ref 22–31)
CREAT SERPL-MCNC: 0.71 MG/DL (ref 0.5–1.4)
DIFFERENTIAL METHOD: ABNORMAL
EOSINOPHIL # BLD AUTO: 0 K/UL (ref 0–0.5)
EOSINOPHIL NFR BLD: 1.4 % (ref 0–8)
ERYTHROCYTE [DISTWIDTH] IN BLOOD BY AUTOMATED COUNT: 13.2 % (ref 11.5–14.5)
EST. GFR  (AFRICAN AMERICAN): >60 ML/MIN/1.73 M^2
EST. GFR  (NON AFRICAN AMERICAN): >60 ML/MIN/1.73 M^2
GLUCOSE SERPL-MCNC: 90 MG/DL (ref 70–110)
HCT VFR BLD AUTO: 30.5 % (ref 37–48.5)
HGB BLD-MCNC: 10.1 G/DL (ref 12–16)
IMM GRANULOCYTES # BLD AUTO: 0 K/UL (ref 0–0.04)
IMM GRANULOCYTES NFR BLD AUTO: 0 % (ref 0–0.5)
LDH SERPL L TO P-CCNC: 417 U/L (ref 313–618)
LYMPHOCYTES # BLD AUTO: 1.1 K/UL (ref 1–4.8)
LYMPHOCYTES NFR BLD: 37.3 % (ref 18–48)
MCH RBC QN AUTO: 38.3 PG (ref 27–31)
MCHC RBC AUTO-ENTMCNC: 33.1 G/DL (ref 32–36)
MCV RBC AUTO: 116 FL (ref 82–98)
MONOCYTES # BLD AUTO: 0.3 K/UL (ref 0.3–1)
MONOCYTES NFR BLD: 10.1 % (ref 4–15)
NEUTROPHILS # BLD AUTO: 1.4 K/UL (ref 1.8–7.7)
NEUTROPHILS NFR BLD: 50.5 % (ref 38–73)
NRBC BLD-RTO: 0 /100 WBC
PLATELET # BLD AUTO: 108 K/UL (ref 150–350)
PMV BLD AUTO: 10.1 FL (ref 9.2–12.9)
POTASSIUM SERPL-SCNC: 4.6 MMOL/L (ref 3.5–5.1)
PROT SERPL-MCNC: 6.4 G/DL (ref 6–8.4)
RBC # BLD AUTO: 2.64 M/UL (ref 4–5.4)
SODIUM SERPL-SCNC: 142 MMOL/L (ref 136–145)
WBC # BLD AUTO: 2.87 K/UL (ref 3.9–12.7)

## 2019-11-04 PROCEDURE — 80053 COMPREHEN METABOLIC PANEL: CPT | Mod: PN

## 2019-11-04 PROCEDURE — 83615 LACTATE (LD) (LDH) ENZYME: CPT | Mod: PN

## 2019-11-04 PROCEDURE — 85025 COMPLETE CBC W/AUTO DIFF WBC: CPT | Mod: PN

## 2019-11-04 PROCEDURE — 80053 COMPREHEN METABOLIC PANEL: CPT

## 2019-11-04 PROCEDURE — 85025 COMPLETE CBC W/AUTO DIFF WBC: CPT

## 2019-11-04 PROCEDURE — 36415 COLL VENOUS BLD VENIPUNCTURE: CPT | Mod: PN

## 2019-11-04 PROCEDURE — 83615 LACTATE (LD) (LDH) ENZYME: CPT

## 2019-11-12 ENCOUNTER — PATIENT MESSAGE (OUTPATIENT)
Dept: GASTROENTEROLOGY | Facility: CLINIC | Age: 69
End: 2019-11-12

## 2019-11-13 DIAGNOSIS — F33.0 MDD (MAJOR DEPRESSIVE DISORDER), RECURRENT EPISODE, MILD: ICD-10-CM

## 2019-11-14 RX ORDER — BUPROPION HYDROCHLORIDE 300 MG/1
TABLET ORAL
Qty: 90 TABLET | Refills: 0 | Status: SHIPPED | OUTPATIENT
Start: 2019-11-14 | End: 2020-02-20

## 2019-12-02 ENCOUNTER — INFUSION (OUTPATIENT)
Dept: INFUSION THERAPY | Facility: HOSPITAL | Age: 69
End: 2019-12-02
Attending: INTERNAL MEDICINE
Payer: MEDICARE

## 2019-12-02 VITALS
RESPIRATION RATE: 14 BRPM | WEIGHT: 199.06 LBS | DIASTOLIC BLOOD PRESSURE: 83 MMHG | HEART RATE: 70 BPM | BODY MASS INDEX: 31.18 KG/M2 | SYSTOLIC BLOOD PRESSURE: 122 MMHG

## 2019-12-02 DIAGNOSIS — D46.9 MDS (MYELODYSPLASTIC SYNDROME): Primary | ICD-10-CM

## 2019-12-02 DIAGNOSIS — Z23 NEED FOR RABIES VACCINATION: ICD-10-CM

## 2019-12-02 PROCEDURE — 63600175 PHARM REV CODE 636 W HCPCS: Mod: PN | Performed by: INTERNAL MEDICINE

## 2019-12-02 PROCEDURE — 96372 THER/PROPH/DIAG INJ SC/IM: CPT | Mod: PN

## 2019-12-02 RX ADMIN — EPOETIN ALFA-EPBX 40000 UNITS: 40000 INJECTION, SOLUTION INTRAVENOUS; SUBCUTANEOUS at 11:12

## 2019-12-09 ENCOUNTER — LAB VISIT (OUTPATIENT)
Dept: LAB | Facility: HOSPITAL | Age: 69
End: 2019-12-09
Attending: INTERNAL MEDICINE
Payer: MEDICARE

## 2019-12-09 DIAGNOSIS — D46.9 MDS (MYELODYSPLASTIC SYNDROME): ICD-10-CM

## 2019-12-09 LAB
BASOPHILS # BLD AUTO: 0.01 K/UL (ref 0–0.2)
BASOPHILS NFR BLD: 0.3 % (ref 0–1.9)
DIFFERENTIAL METHOD: ABNORMAL
EOSINOPHIL # BLD AUTO: 0 K/UL (ref 0–0.5)
EOSINOPHIL NFR BLD: 0.9 % (ref 0–8)
ERYTHROCYTE [DISTWIDTH] IN BLOOD BY AUTOMATED COUNT: 13.5 % (ref 11.5–14.5)
HCT VFR BLD AUTO: 32.1 % (ref 37–48.5)
HGB BLD-MCNC: 10.3 G/DL (ref 12–16)
IMM GRANULOCYTES # BLD AUTO: 0 K/UL (ref 0–0.04)
IMM GRANULOCYTES NFR BLD AUTO: 0 % (ref 0–0.5)
LYMPHOCYTES # BLD AUTO: 1.1 K/UL (ref 1–4.8)
LYMPHOCYTES NFR BLD: 34.3 % (ref 18–48)
MCH RBC QN AUTO: 37.7 PG (ref 27–31)
MCHC RBC AUTO-ENTMCNC: 32.1 G/DL (ref 32–36)
MCV RBC AUTO: 118 FL (ref 82–98)
MONOCYTES # BLD AUTO: 0.3 K/UL (ref 0.3–1)
MONOCYTES NFR BLD: 8.9 % (ref 4–15)
NEUTROPHILS # BLD AUTO: 1.8 K/UL (ref 1.8–7.7)
NEUTROPHILS NFR BLD: 55.6 % (ref 38–73)
NRBC BLD-RTO: 0 /100 WBC
PLATELET # BLD AUTO: 112 K/UL (ref 150–350)
PMV BLD AUTO: 10.9 FL (ref 9.2–12.9)
RBC # BLD AUTO: 2.73 M/UL (ref 4–5.4)
WBC # BLD AUTO: 3.27 K/UL (ref 3.9–12.7)

## 2019-12-09 PROCEDURE — 36415 COLL VENOUS BLD VENIPUNCTURE: CPT | Mod: PN

## 2019-12-09 PROCEDURE — 85025 COMPLETE CBC W/AUTO DIFF WBC: CPT | Mod: PN

## 2019-12-09 PROCEDURE — 85025 COMPLETE CBC W/AUTO DIFF WBC: CPT

## 2019-12-24 DIAGNOSIS — E03.9 HYPOTHYROIDISM, UNSPECIFIED TYPE: ICD-10-CM

## 2019-12-24 RX ORDER — LEVOTHYROXINE SODIUM 100 UG/1
TABLET ORAL
Qty: 90 TABLET | Refills: 0 | Status: SHIPPED | OUTPATIENT
Start: 2019-12-24 | End: 2020-03-27

## 2019-12-30 ENCOUNTER — INFUSION (OUTPATIENT)
Dept: INFUSION THERAPY | Facility: HOSPITAL | Age: 69
End: 2019-12-30
Attending: INTERNAL MEDICINE
Payer: MEDICARE

## 2019-12-30 VITALS
SYSTOLIC BLOOD PRESSURE: 114 MMHG | BODY MASS INDEX: 31.18 KG/M2 | HEART RATE: 60 BPM | RESPIRATION RATE: 16 BRPM | DIASTOLIC BLOOD PRESSURE: 69 MMHG | WEIGHT: 199.06 LBS

## 2019-12-30 DIAGNOSIS — D46.9 MDS (MYELODYSPLASTIC SYNDROME): Primary | ICD-10-CM

## 2019-12-30 DIAGNOSIS — Z23 NEED FOR RABIES VACCINATION: ICD-10-CM

## 2019-12-30 PROCEDURE — 63600175 PHARM REV CODE 636 W HCPCS: Mod: PN | Performed by: PHYSICIAN ASSISTANT

## 2019-12-30 PROCEDURE — 96372 THER/PROPH/DIAG INJ SC/IM: CPT | Mod: PN

## 2019-12-30 RX ADMIN — EPOETIN ALFA-EPBX 40000 UNITS: 40000 INJECTION, SOLUTION INTRAVENOUS; SUBCUTANEOUS at 10:12

## 2019-12-31 ENCOUNTER — OFFICE VISIT (OUTPATIENT)
Dept: ORTHOPEDICS | Facility: CLINIC | Age: 69
End: 2019-12-31
Payer: MEDICARE

## 2019-12-31 ENCOUNTER — OFFICE VISIT (OUTPATIENT)
Dept: PAIN MEDICINE | Facility: CLINIC | Age: 69
End: 2019-12-31
Payer: MEDICARE

## 2019-12-31 ENCOUNTER — HOSPITAL ENCOUNTER (OUTPATIENT)
Dept: RADIOLOGY | Facility: HOSPITAL | Age: 69
Discharge: HOME | End: 2019-12-31
Attending: ORTHOPAEDIC SURGERY
Payer: MEDICARE

## 2019-12-31 VITALS
HEART RATE: 63 BPM | OXYGEN SATURATION: 98 % | DIASTOLIC BLOOD PRESSURE: 53 MMHG | BODY MASS INDEX: 31.18 KG/M2 | RESPIRATION RATE: 18 BRPM | WEIGHT: 199.06 LBS | SYSTOLIC BLOOD PRESSURE: 108 MMHG | TEMPERATURE: 97 F

## 2019-12-31 VITALS
SYSTOLIC BLOOD PRESSURE: 120 MMHG | HEART RATE: 63 BPM | DIASTOLIC BLOOD PRESSURE: 68 MMHG | WEIGHT: 199.06 LBS | HEIGHT: 67 IN | BODY MASS INDEX: 31.24 KG/M2

## 2019-12-31 DIAGNOSIS — M25.511 RIGHT SHOULDER PAIN, UNSPECIFIED CHRONICITY: ICD-10-CM

## 2019-12-31 DIAGNOSIS — M25.511 RIGHT SHOULDER PAIN, UNSPECIFIED CHRONICITY: Primary | ICD-10-CM

## 2019-12-31 DIAGNOSIS — M25.811 SHOULDER IMPINGEMENT, RIGHT: Primary | ICD-10-CM

## 2019-12-31 DIAGNOSIS — M25.511 ACUTE PAIN OF RIGHT SHOULDER: Primary | ICD-10-CM

## 2019-12-31 PROCEDURE — 99214 OFFICE O/P EST MOD 30 MIN: CPT | Mod: PBBFAC,25,PN | Performed by: PHYSICIAN ASSISTANT

## 2019-12-31 PROCEDURE — 1125F AMNT PAIN NOTED PAIN PRSNT: CPT | Mod: ,,, | Performed by: ORTHOPAEDIC SURGERY

## 2019-12-31 PROCEDURE — 99213 OFFICE O/P EST LOW 20 MIN: CPT | Mod: S$PBB,,, | Performed by: PHYSICIAN ASSISTANT

## 2019-12-31 PROCEDURE — 73030 X-RAY EXAM OF SHOULDER: CPT | Mod: TC,PO,RT

## 2019-12-31 PROCEDURE — 20610 LARGE JOINT ASPIRATION/INJECTION: R SUBACROMIAL BURSA: ICD-10-PCS | Mod: S$PBB,RT,, | Performed by: ORTHOPAEDIC SURGERY

## 2019-12-31 PROCEDURE — 1159F MED LIST DOCD IN RCRD: CPT | Mod: ,,, | Performed by: ORTHOPAEDIC SURGERY

## 2019-12-31 PROCEDURE — 99213 PR OFFICE/OUTPT VISIT, EST, LEVL III, 20-29 MIN: ICD-10-PCS | Mod: S$PBB,,, | Performed by: PHYSICIAN ASSISTANT

## 2019-12-31 PROCEDURE — 1159F PR MEDICATION LIST DOCUMENTED IN MEDICAL RECORD: ICD-10-PCS | Mod: ,,, | Performed by: ORTHOPAEDIC SURGERY

## 2019-12-31 PROCEDURE — 73030 XR SHOULDER TRAUMA 3 VIEW RIGHT: ICD-10-PCS | Mod: 26,RT,, | Performed by: RADIOLOGY

## 2019-12-31 PROCEDURE — 1159F MED LIST DOCD IN RCRD: CPT | Mod: ,,, | Performed by: PHYSICIAN ASSISTANT

## 2019-12-31 PROCEDURE — 1159F PR MEDICATION LIST DOCUMENTED IN MEDICAL RECORD: ICD-10-PCS | Mod: ,,, | Performed by: PHYSICIAN ASSISTANT

## 2019-12-31 PROCEDURE — 99999 PR PBB SHADOW E&M-EST. PATIENT-LVL III: CPT | Mod: PBBFAC,,, | Performed by: ORTHOPAEDIC SURGERY

## 2019-12-31 PROCEDURE — 99204 PR OFFICE/OUTPT VISIT, NEW, LEVL IV, 45-59 MIN: ICD-10-PCS | Mod: S$PBB,25,, | Performed by: ORTHOPAEDIC SURGERY

## 2019-12-31 PROCEDURE — 20610 DRAIN/INJ JOINT/BURSA W/O US: CPT | Mod: PBBFAC,PN | Performed by: ORTHOPAEDIC SURGERY

## 2019-12-31 PROCEDURE — 99999 PR PBB SHADOW E&M-EST. PATIENT-LVL IV: ICD-10-PCS | Mod: PBBFAC,,, | Performed by: PHYSICIAN ASSISTANT

## 2019-12-31 PROCEDURE — 1125F AMNT PAIN NOTED PAIN PRSNT: CPT | Mod: ,,, | Performed by: PHYSICIAN ASSISTANT

## 2019-12-31 PROCEDURE — 99213 OFFICE O/P EST LOW 20 MIN: CPT | Mod: PBBFAC,25,27,PN | Performed by: ORTHOPAEDIC SURGERY

## 2019-12-31 PROCEDURE — 99999 PR PBB SHADOW E&M-EST. PATIENT-LVL III: ICD-10-PCS | Mod: PBBFAC,,, | Performed by: ORTHOPAEDIC SURGERY

## 2019-12-31 PROCEDURE — 99204 OFFICE O/P NEW MOD 45 MIN: CPT | Mod: S$PBB,25,, | Performed by: ORTHOPAEDIC SURGERY

## 2019-12-31 PROCEDURE — 1125F PR PAIN SEVERITY QUANTIFIED, PAIN PRESENT: ICD-10-PCS | Mod: ,,, | Performed by: PHYSICIAN ASSISTANT

## 2019-12-31 PROCEDURE — 99999 PR PBB SHADOW E&M-EST. PATIENT-LVL IV: CPT | Mod: PBBFAC,,, | Performed by: PHYSICIAN ASSISTANT

## 2019-12-31 PROCEDURE — 1125F PR PAIN SEVERITY QUANTIFIED, PAIN PRESENT: ICD-10-PCS | Mod: ,,, | Performed by: ORTHOPAEDIC SURGERY

## 2019-12-31 PROCEDURE — 73030 X-RAY EXAM OF SHOULDER: CPT | Mod: 26,RT,, | Performed by: RADIOLOGY

## 2019-12-31 RX ADMIN — TRIAMCINOLONE ACETONIDE 40 MG: 40 INJECTION, SUSPENSION INTRA-ARTICULAR; INTRAMUSCULAR at 10:12

## 2019-12-31 NOTE — PROCEDURES
Large Joint Aspiration/Injection: R subacromial bursa  Date/Time: 12/31/2019 10:30 AM  Performed by: Refugio Acosta MD  Authorized by: Refugio Acosta MD     Consent Done?:  Yes (Verbal)  Indications:  Pain  Procedure site marked: Yes    Timeout: Prior to procedure the correct patient, procedure, and site was verified      Location:  Shoulder  Site:  R subacromial bursa  Prep: Patient was prepped and draped in usual sterile fashion    Needle size:  22 G  Ultrasonic Guidance for needle placement: No  Approach:  Anterior  Medications:  40 mg triamcinolone acetonide 40 mg/mL; 40 mg triamcinolone acetonide 40 mg/mL  Patient tolerance:  Patient tolerated the procedure well with no immediate complications

## 2019-12-31 NOTE — PROGRESS NOTES
Chief Complaint   Patient presents with    Right Shoulder - Pain       HPI:    This is a 69 y.o. who presents today complaining of right shoudler pain for past few months after no known injury. Pain is aching. No numbness or tingling. No associated signs or symptoms.      Past Medical History:   Diagnosis Date    Abnormal thallium stress test     2/17 Mild, moderate diagonal disease. Small distal LAD. Mild diastolic dysfunction.    Allergy     Anemia     Anticoagulant long-term use     ASA 81 mg    Anxiety     Atherosclerotic heart disease of native coronary artery with angina pectoris     2/17 [facility] Mild, moderate diagonal disease. Small distal LAD. Mild diastolic dysfunction.    Cataract     OU    Cholelithiasis     Chronic pain     Leg, lower back    Colon cancer     Colon polyp     DDD (degenerative disc disease), lumbar     h/o sciatica    Depression     Encounter for blood transfusion     Esophageal stenosis     Fatty liver     Fibromyalgia     Gastritis     GERD (gastroesophageal reflux disease)     hiatal as well    Hiatal hernia     History of psychiatric hospitalization     1997 at [place]    History of renal calculi     Hypersomnia with sleep apnea     uses C-pap    Hypothyroidism 1/28/2013    Hashimoto's, with Hypothyroidism    Irritable bowel syndrome     MDS (myelodysplastic syndrome) 1/28/2013    Dr. Xavier     Meningitis     age 3    Myelodysplastic syndrome     bone marrow cancer    Myeloid dysplasia     CORWIN (obstructive sleep apnea)     doesn't use her cpap    PAF (paroxysmal atrial fibrillation) 12/28/2012    PAF in setting of iatrogenic hyperthyroid                                   s/p AFL RFA 2008     Pancytopenia 11/18/2013    PVD (posterior vitreous detachment)     OU    Retinal detachment     HST-OS    Rheumatic fever     as a child    Supraventricular tachycardia     Thrombocytopenia       Past Surgical History:   Procedure Laterality Date     APPENDECTOMY      ATRIAL ABLATION SURGERY      BACK SURGERY      Right Spinal Cord Stimulator    BONE MARROW BIOPSY      myelodysplastic syndrome    BREAST BIOPSY Left 2016    fibrocystic    CHOLECYSTECTOMY  11/26/2001    Laparascopic    COLON SURGERY  7/2011  ([facility]?  [facility])     ~2011  ([facility]?  [facility])    Colon Resection:  Cecum, for polyp with tubulovillous adenocarcinoma.    COLONOSCOPY  6/2015    Dr. rodriguez, repeat in 3 years    COLONOSCOPY N/A 10/22/2018    Procedure: COLONOSCOPY;  Surgeon: Cooper Rodriguez Jr., MD;  Location: [facility] ENDO;  Service: Endoscopy;  Laterality: N/A;    COLONOSCOPY  10/22/2018    Dr. Rodriguez: 3 colon polyps removed, redundant colon, diverticulosis, repeat in 3 years for surveillance; biopsy tubular adenomas x 2, 1 hyperplastic polyp    COLONOSCOPY W/ BIOPSIES AND POLYPECTOMY  5/05/2010  [place]    COLONOSCOPY W/ POLYPECTOMY  6/19/2015  [facility]    Four 1 to 4 mm polyps at the hepatic flexure and distal ascending colon.  -Tubulovillous adenoma.   Diverticulosis in the sigmoid colon.  Patent end-to-end ileo-colonic anastomosis in the proximal/mid ascending colon.   Redundant colon.      Epidural Steroid Injection      Previous injection 17 years ago with Dr. Pang, Pain Management, 1 with Dr. Samuels    EPIDURAL STEROID INJECTION INTO CERVICAL SPINE N/A 8/13/2019    Procedure: Injection-steroid-epidural-cervical;  Surgeon: Adan Samuels MD;  Location[facility];  Service: Pain Management;  Laterality: N/A;    ESOPHAGOGASTRODUODENOSCOPY  11/06/2008    GERD & Gastritis    ESOPHAGOGASTRODUODENOSCOPY N/A 6/6/2019    Procedure: EGD (ESOPHAGOGASTRODUODENOSCOPY);  Surgeon: Cooper Rodriguez Jr., MD;  Location: [facility] ENDO;  Service: Endoscopy;  Laterality: N/A;    EYE SURGERY      Focal Laser OU    HYSTERECTOMY      ovaries spared    LEFT HEART CATHETERIZATION Right 12/7/2018    Procedure: Left heart cath;  Surgeon: Deandre Wheatley MD;  Location: [facility] CATH;  Service:  Cardiology;  Laterality: Right;    Lumbar Steroid Injection      Pain management    SCS REMOVAL  3/16/16    [facility]    SPINAL FUSION  1999    L4-L5    SPINE SURGERY  3/16/16    L4-5 PEDICLE SCREW & RODS REMOVED/[facility]    TUBAL LIGATION      UPPER GASTROINTESTINAL ENDOSCOPY  12/2015    Dr. Rodriguez    UPPER GASTROINTESTINAL ENDOSCOPY  06/06/2019    Dr. Rodriguez; gastritis; esophagus dilated    VAGINAL DELIVERY      times 3      Current Outpatient Medications on File Prior to Visit   Medication Sig Dispense Refill    amLODIPine (NORVASC) 2.5 MG tablet Take 1 tablet (2.5 mg total) by mouth every evening. 90 tablet 5    ascorbic acid, vitamin C, (VITAMIN C) 1000 MG tablet Take 1,000 mg by mouth once daily.      aspirin (ECOTRIN) 81 MG EC tablet Take 81 mg by mouth once daily.      atorvastatin (LIPITOR) 20 MG tablet Take 1 tablet (20 mg total) by mouth every evening. 90 tablet 3    b complex vitamins tablet Take 1 tablet by mouth every morning.       bisacodyl (DULCOLAX) 5 mg EC tablet Take 5 mg by mouth daily as needed for Constipation.      buPROPion (WELLBUTRIN XL) 300 MG 24 hr tablet TAKE 1 TABLET BY MOUTH EVERY MORNING 90 tablet 0    cholecalciferol, vitamin D3, (VITAMIN D) 1,000 unit capsule Take 2,000 Units by mouth every morning.       docusate sodium (STOOL SOFTENER) 100 mg capsule Take 100 mg by mouth 2 (two) times daily. (Patient taking differently: Take 100 mg by mouth every evening. )  0    FLUoxetine 40 MG capsule TAKE 1 CAPSULE(40 MG) BY MOUTH EVERY DAY 90 capsule 0    gabapentin (NEURONTIN) 300 MG capsule TAKE 2 CAPSULES BY MOUTH TWICE DAILY 360 capsule 3    HYDROcodone-acetaminophen (NORCO) 7.5-325 mg per tablet Take 1 tablet by mouth daily as needed for Pain. 30 tablet 0    hyoscyamine (LEVSIN/SL) 0.125 mg Subl Take 2 tablets (0.25 mg total) by mouth every 4 (four) hours as needed. 1 or 2 SL, q 3-4 hrs PRN, esophageal spasm. 30 tablet 11    levothyroxine (SYNTHROID) 100 MCG tablet  TAKE 1 TABLET BY MOUTH EVERY DAY 90 tablet 0    nitroGLYCERIN (NITROSTAT) 0.4 MG SL tablet Place 1 tablet (0.4 mg total) under the tongue every 5 (five) minutes as needed for Chest pain. 30 tablet 12    omeprazole (PRILOSEC) 40 MG capsule TAKE 1 CAPSULE(40 MG) BY MOUTH BEFORE BREAKFAST 90 capsule 3    sucralfate (CARAFATE) 1 gram tablet TAKE 1 TABLET(1 GRAM) BY MOUTH FOUR TIMES DAILY BEFORE MEALS AND AT NIGHT 120 tablet 5    traZODone (DESYREL) 100 MG tablet TAKE 1 TO 2 TABLETS BY MOUTH EVERY NIGHT AT BEDTIME FOR SLEEP 180 tablet 1    buPROPion (WELLBUTRIN XL) 300 MG 24 hr tablet TAKE 1 TABLET(300 MG) BY MOUTH EVERY MORNING (Patient not taking: Reported on 12/31/2019) 90 tablet 0    oxycodone-acetaminophen (PERCOCET)  mg per tablet Take 1 tablet by mouth every 6 (six) hours as needed for Pain. (Patient not taking: Reported on 12/31/2019) 40 tablet 0    ranitidine (ZANTAC) 300 MG tablet TAKE 1 TABLET BY MOUTH EVERY NIGHT 30 TO 60 MINUTES BEFORE BEDTIME (Patient not taking: Reported on 12/31/2019) 90 tablet 3     No current facility-administered medications on file prior to visit.       Review of patient's allergies indicates:   Allergen Reactions    Sulfa (sulfonamide antibiotics) Hives     Other reaction(s): Hives      Family History not pertinent   Social History     Socioeconomic History    Marital status:      Spouse name: Not on file    Number of children: Not on file    Years of education: Not on file    Highest education level: Not on file   Occupational History    Not on file   Social Needs    Financial resource strain: Not on file    Food insecurity:     Worry: Not on file     Inability: Not on file    Transportation needs:     Medical: Not on file     Non-medical: Not on file   Tobacco Use    Smoking status: Never Smoker    Smokeless tobacco: Never Used   Substance and Sexual Activity    Alcohol use: No    Drug use: No    Sexual activity: Not Currently   Lifestyle     Physical activity:     Days per week: Not on file     Minutes per session: Not on file    Stress: Not on file   Relationships    Social connections:     Talks on phone: Not on file     Gets together: Not on file     Attends Yazidism service: Not on file     Active member of club or organization: Not on file     Attends meetings of clubs or organizations: Not on file     Relationship status: Not on file   Other Topics Concern    Caffeine Use Not Asked    Financial Status: Disabled Not Asked    Legal: Involved in criminal litigation Not Asked    Caffeine Use: Frequent Not Asked    Financial Status: Employed Not Asked    Legal: Other Not Asked    Caffeine Use: Moderate Not Asked    Financial Status: Unemployed Not Asked    Leisure: Exercise Not Asked    Caffeine Use: Substantial Not Asked    Financial Status: Other Not Asked    Leisure: Fishing Not Asked    Childhood History: Adopted Not Asked    Firearms: Does patient have access to a firearm? Not Asked    Leisure: Hunting Not Asked    Childhood History: Early trauma Not Asked    Home situation: homeless Not Asked    Leisure: Movie Watching Not Asked    Childhood History: Raised by parents Not Asked    Home situation: lives alone Not Asked    Leisure: Shopping Not Asked    Childhood History: Uneventful Not Asked    Home situation: lives in group home Not Asked    Leisure: Sports Not Asked    Childhood History: Other Not Asked    Home situation: lives in nursing home Not Asked    Leisure: Time with family Not Asked    Education: Unfinished High School Not Asked    Home situation: lives in shelter Not Asked     Service Not Asked    Education: High School Graduate Not Asked    Home situation: lives with family Not Asked    Spirituality: Active Participation Not Asked    Education: Unfinished college Not Asked    Home situation: lives with friends Not Asked    Spirituality: Organized Amish Not Asked    Education: Trade  School Not Asked    Home situation: lives with significant other Not Asked    Spirituality: Private Participation Not Asked    Education: Associate's Degree Not Asked    Home situation: lives with spouse Not Asked    Patient feels they ought to cut down on drinking/drug use Not Asked    Education: Bachelor's Degree Not Asked    Legal consequences of chemical use Not Asked    Patient annoyed by others criticizing their drinking/drug use Not Asked    Education: More than one Bachelor's or Professional Not Asked    Legal: Arrest history Not Asked    Patient has felt bad or guilty about drinking/drug use Not Asked    Education: Master's, PhD Not Asked    Legal: Involved in civil litigation Not Asked    Patient has had a drink/used drugs as an eye opener in the AM Not Asked    Are you pregnant or think you may be? Not Asked    Breast-feeding Not Asked   Social History Narrative    Not on file         Review of Systems:   Constitutional:  Denies fever or chills    Eyes:  Denies change in visual acuity    HENT:  Denies nasal congestion or sore throat    Respiratory:  Denies cough or shortness of breath    Cardiovascular:  Denies chest pain or edema    GI:  Denies abdominal pain, nausea, vomiting, bloody stools or diarrhea    :  Denies dysuria    Integument:  Denies rash    Neurologic:  Denies headache, focal weakness or sensory changes    Endocrine:  Denies polyuria or polydipsia    Lymphatic:  Denies swollen glands    Psychiatric:  Denies depression or anxiety       Physical Exam:    Constitutional:  Well developed, well nourished, no acute distress, non-toxic appearance    Integument:  Well hydrated, no rash    Lymphatic:  No lymphadenopathy noted    Neurologic:  Alert & oriented x 3,     Psychiatric:  Speech and behavior appropriate    Gi: abdomen soft  Eyes: EOMI     Bilateral Shoulder Exam    left Shoulder Exam   Shoulder exam performed same as contralateral side and is normal.    right Shoulder  Exam   Tenderness   Shoulder tenderness location: diffusely about shoulder.    Range of Motion   Forward Flexion: abnormal   External Rotation: abnormal     Muscle Strength   Supraspinatus: 4/5     Tests   Hawkin's test: positive  Impingement: positive    Other   Erythema: absent  Sensation: normal  Pulse: present      X-rays were performed today, personally reviewed by me and findings discussed with the patient.   3 views of the right shoulder show no fracture or dislocation      Shoulder impingement, right  -     Large Joint Aspiration/Injection: R subacromial bursa             HEP with bands provided. Using an aseptic technique, I injected 5 cc of lidocaine 1% without and 1 cc of kenalog 40mg into the right Shoulder. The patient tolerated this well. I will have them return to clinic in 6 weeks.

## 2019-12-31 NOTE — LETTER
January 10, 2020      RUMA Serra  [address] [facility] [address] [place] [address]           [facility]  [address] [place] [address]  Phone: [phone]          Patient: Charmaine Whalen   MR Number: [id number]   YOB: 1950   Date of Visit: 12/31/2019       Dear Damien Hernandez:    Thank you for referring Charmaine Whalen to me for evaluation. Attached you will find relevant portions of my assessment and plan of care.    If you have questions, please do not hesitate to call me. I look forward to following Charmaine Whalen along with you.    Sincerely,    Refugio Acosta MD    Enclosure  CC:  No Recipients    If you would like to receive this communication electronically, please contact [email or web address] or [phone] to request more information on [employer] access.    For providers and/or their staff who would like to refer a patient to [facility], please contact us through our one-stop-shop provider referral line, [facility] , at [phone].    If you feel you have received this communication in error or would no longer like to receive these types of communications, please e-mail [email or web address]

## 2020-01-06 ENCOUNTER — LAB VISIT (OUTPATIENT)
Dept: LAB | Facility: HOSPITAL | Age: 70
End: 2020-01-06
Attending: PHYSICIAN ASSISTANT
Payer: MEDICARE

## 2020-01-06 DIAGNOSIS — D46.9 MDS (MYELODYSPLASTIC SYNDROME): ICD-10-CM

## 2020-01-06 LAB
BASOPHILS # BLD AUTO: 0.01 K/UL (ref 0–0.2)
BASOPHILS NFR BLD: 0.2 % (ref 0–1.9)
DIFFERENTIAL METHOD: ABNORMAL
EOSINOPHIL # BLD AUTO: 0 K/UL (ref 0–0.5)
EOSINOPHIL NFR BLD: 0.5 % (ref 0–8)
ERYTHROCYTE [DISTWIDTH] IN BLOOD BY AUTOMATED COUNT: 13.5 % (ref 11.5–14.5)
HCT VFR BLD AUTO: 32.3 % (ref 37–48.5)
HGB BLD-MCNC: 10.5 G/DL (ref 12–16)
IMM GRANULOCYTES # BLD AUTO: 0.01 K/UL (ref 0–0.04)
IMM GRANULOCYTES NFR BLD AUTO: 0.2 % (ref 0–0.5)
LYMPHOCYTES # BLD AUTO: 1.2 K/UL (ref 1–4.8)
LYMPHOCYTES NFR BLD: 28.3 % (ref 18–48)
MCH RBC QN AUTO: 37.5 PG (ref 27–31)
MCHC RBC AUTO-ENTMCNC: 32.5 G/DL (ref 32–36)
MCV RBC AUTO: 115 FL (ref 82–98)
MONOCYTES # BLD AUTO: 0.4 K/UL (ref 0.3–1)
MONOCYTES NFR BLD: 9.4 % (ref 4–15)
NEUTROPHILS # BLD AUTO: 2.6 K/UL (ref 1.8–7.7)
NEUTROPHILS NFR BLD: 61.4 % (ref 38–73)
NRBC BLD-RTO: 1 /100 WBC
PLATELET # BLD AUTO: 116 K/UL (ref 150–350)
PMV BLD AUTO: 10.1 FL (ref 9.2–12.9)
RBC # BLD AUTO: 2.8 M/UL (ref 4–5.4)
WBC # BLD AUTO: 4.24 K/UL (ref 3.9–12.7)

## 2020-01-06 PROCEDURE — 36415 COLL VENOUS BLD VENIPUNCTURE: CPT | Mod: PN

## 2020-01-06 PROCEDURE — 85025 COMPLETE CBC W/AUTO DIFF WBC: CPT | Mod: PN

## 2020-01-06 PROCEDURE — 85025 COMPLETE CBC W/AUTO DIFF WBC: CPT

## 2020-01-06 NOTE — PROGRESS NOTES
This note was completed with dictation software and grammatical errors may exist.    CC: back and leg pain    HPI: The patient is a 69 year-old Female with a history of colon CA, sleep apnea, anemia, depression and postlaminectomy syndrome now with myelodysplastic syndrome who presents in referral from Dr. Villanueva.  She returns in follow-up today complaining of right shoulder pain extending into the right upper arm.  She currently denies having any neck pain. She describes the pain as sharp, worse with using her right arm and not significantly improved with anything.  She denies having any numbness or weakness, no bladder or bowel incontinence.    Pain intervention history:  She is status post caudal epidural steroid injection on 5/7/14 with 90% relief lasting 3 days, then the pain slowly came back over 7 days.  She is status post caudal epidural steroid injection on 6/17/14 with 70% relief.  She is status post caudal epidural steroid injection on 9/10/15 with almost complete relief lasting 5-6 days.  She is status post left L3 and L5 transforaminal epidural steroid injections on 10/14/15 with 0% relief.  She is status post left L4 transforaminal epidural steroid injection on 1/13/16 with about 90% relief lasting one week.  She underwent removal spinal cord stimulator and L4/5 hardware on 3/16/16 with Dr. Aldrich.  She is status post caudal epidural steroid injection on 4/25/17 with 0% relief.  She is status post bilateral L5/S1 facet joint injections on 7/14/17 with 0% relief.  She is status post C7-T1 interlaminar epidural steroid injection on 08/13/2019 with 0% relief.      ROS:She reports fatigue, joint pain.  Balance of review of systems is negative.    Medical, surgical, family and social history reviewed elsewhere in record.    Medications/Allergies: See med card    Vitals:    12/31/19 0956   BP: (!) 108/53   Pulse: 63   Resp: 18   Temp: 97.1 °F (36.2 °C)   TempSrc: Oral   SpO2: 98%   Weight: 90.3 kg  (199 lb 1.2 oz)   PainSc:   8   PainLoc: Shoulder         Physical exam:  Gen: A and O x3, pleasant, well-groomed  Skin: No rashes or obvious lesions  HEENT: PERRLA  CVS: Regular rate and rhythm, normal S1 and S2, no murmurs.  Resp: Clear to auscultation bilaterally, no wheezes or rales.  Abdomen: Soft, NT/ND, normal bowel sounds present.  Musculoskeletal: Nonantalgic gait.    Neuro:  Upper extremities: 5/5 strength bilaterally   Lower extremities: 5/5 strength bilaterally  Reflexes: Brachioradialis 2+, Bicep 2+, Tricep 2+. Patellar 0+, Achilles 2+ bilaterally.  Sensory: Intact and symmetrical to light touch and pinprick in C2-T1 dermatomes bilaterally. Intact and symmetrical to light touch and pinprick in L2-S1 dermatomes bilaterally.    Cervical Spine:  Cervical spine: ROM is full in flexion, extension and lateral rotation without pain.  Spurling's maneuver causes no neck pain to either side.  Myofascial exam:  No tenderness to palpation to the cervical paraspinous muscles.    Right shoulders am:  Range of motion is reduced to about 90° in flexion and abduction with increased right shoulder pain after about 75°.  Empty can test positive.  No weakness with internal and external rotation.    Imaging:  MRI LUMBAR SPINE W/WO CONTRAST ON 8/22/06  1. MINIMAL L3-4 DISC BULGE CAUSING MINIMAL THECAL SAC COMPRESSION, AND THERE IS ALSO MILD BILATERAL DEGENERATIVE FACET ARTHROSIS.   2. L4-5 DISCECTOMY AND L5 LAMINECTOMY WITH NO EVIDENCE OF A SIGNIFICANT SPINAL CANAL COMPROMISE. THERE IS MILD BILATERAL FORAMINAL NARROWING AT THE L4-5 LEVEL   3. DEGENERATION AND MILD BULGING OF THE L5-S1 DISC WITH ASSOCIATED MILD THECAL SAC COMPRESSION.   4. ROUNDED MASS ARISING FROM THE LEFT KIDNEY WHICH MOST LIKELY REPRESENTS A CYST. ULTRASOUND IS RECOMMENDED TO EXCLUDE A RENAL NEOPLASM.    2/11/14 x-ray tib-fib:  No obvious right tibial or fibular fracture is noted. No worrisome abnormality is noted. A linear metallic density is noted on the  lateral view overlying the forefoot dorsally near the navicular cuneiform articulation. This could relate to a foreign body or overlying density relating to clothing. Please clinically correlate a small quadriceps patellar spur is noted.    10/1/15 CT lumbar spine  T12/L1: Mild disk bulging is evident and degenerative facet changes are noted at this level. The canal and foramina are grossly intact.  L1/L2: Mild annular disk bulge and mild degenerative facet changes produce only minimal canal and foraminal distortion to  L2/L3: Degenerative facet changes noted in the mild annular disk and facet disease.  L3/L4: Posterior canal ligamentous hypertrophy and degenerative facet changes are present. There is moderate annular disk bulging. This combines to produce mild canal and left greater than right foraminal narrowing. This is slightly worsened from the previous exam.  L4/L5: The canal is grossly intact as the surgical level. Degenerative facet changes are noted bilaterally.  L5/S1: Moderate degenerative facet changes noted bilaterally and there is suggestion of disk bulging with left greater than right foraminal narrowing.    11/16/16 MRI lumbar spine with and without contrast  There have been prior laminectomies at L4 and L5, with interbody spacer present at L4-5.  Vertebral body height and alignment are within normal limits.  Marrow signal is heterogeneous.  There is moderate marrow enhancement within the L4 and L5 vertebral bodies centered about the disc space which is likely reactive.  The conus terminates at T12-L1.  Disc desiccation is present throughout the lumbar spine.  Mild loss of disc height is present at the L5-S1 level.  L1-L2:  No disc herniation. No spinal canal or neuroforaminal narrowing.  L2-L3:  No disc herniation. No spinal canal or neuroforaminal narrowing.  L3-L4:  There is mild diffuse disc bulging and moderate facet arthropathy with significant thickening of the ligament of flavum.  The  findings contribute to mild spinal canal narrowing without significant neuroforaminal narrowing.  L4-L5:  Moderate bilateral neuroforaminal narrowing is present which appears predominantly due to facet arthropathy, with some contribution from enhancing scar tissue formation within the neuroforamen on the right.  No spinal canal narrowing.  L5-S1:  There is mild broad-based posterior bulging of the disc, abdomen and moderate bilateral facet arthropathy.  There is resultant mild bilateral neuroforaminal narrowing.    03/28/2018 CT cervical spine  Multiple computerized images of the cervical spine were obtained in axial projection using thin slices.  No intravenous contrast was administered.  Study was viewed at soft tissue and bone window settings.  Reconstructions were done in coronal and sagittal planes.  There is no apparent fracture or subluxation.  The prevertebral soft tissue space is not widened.  There are degenerative changes present involving mainly the C5-C6 and C6-C7 levels. Findings consistent of vertebral body osteophyte formation and narrowing of the disc spaces.      Assessment:  The patient is a 69year-old Female with a history of colon CA, sleep apnea, anemia, depression and postlaminectomy syndrome now with myelodysplastic syndrome who presents in referral from Dr. Villanueva.  1. Acute pain of right shoulder         Plan:  1.  I believe the patient's current pain is directly related to her right shoulder and not cervical radicular symptoms.  I am going to have her see orthopedics for further evaluation and she will follow-up as needed.    Greater than 50% of this 15 min visit was spent counseling patient.

## 2020-01-07 ENCOUNTER — PES CALL (OUTPATIENT)
Dept: ADMINISTRATIVE | Facility: CLINIC | Age: 70
End: 2020-01-07

## 2020-01-10 RX ORDER — TRIAMCINOLONE ACETONIDE 40 MG/ML
40 INJECTION, SUSPENSION INTRA-ARTICULAR; INTRAMUSCULAR
Status: DISCONTINUED | OUTPATIENT
Start: 2019-12-31 | End: 2020-01-10 | Stop reason: HOSPADM

## 2020-01-23 DIAGNOSIS — I34.0 NON-RHEUMATIC MITRAL REGURGITATION: ICD-10-CM

## 2020-01-23 DIAGNOSIS — F41.1 GAD (GENERALIZED ANXIETY DISORDER): ICD-10-CM

## 2020-01-23 DIAGNOSIS — I48.0 PAROXYSMAL ATRIAL FIBRILLATION: Chronic | ICD-10-CM

## 2020-01-23 DIAGNOSIS — F33.0 MDD (MAJOR DEPRESSIVE DISORDER), RECURRENT EPISODE, MILD: ICD-10-CM

## 2020-01-23 DIAGNOSIS — I25.119 ATHEROSCLEROSIS OF NATIVE CORONARY ARTERY OF NATIVE HEART WITH ANGINA PECTORIS: ICD-10-CM

## 2020-01-23 RX ORDER — AMLODIPINE BESYLATE 2.5 MG/1
TABLET ORAL
Qty: 90 TABLET | Refills: 4 | Status: SHIPPED | OUTPATIENT
Start: 2020-01-23

## 2020-01-29 RX ORDER — FLUOXETINE HYDROCHLORIDE 40 MG/1
CAPSULE ORAL
Qty: 90 CAPSULE | Refills: 0 | Status: SHIPPED | OUTPATIENT
Start: 2020-01-29 | End: 2020-04-24

## 2020-02-07 ENCOUNTER — OFFICE VISIT (OUTPATIENT)
Dept: PAIN MEDICINE | Facility: CLINIC | Age: 70
End: 2020-02-07
Payer: MEDICARE

## 2020-02-07 VITALS
DIASTOLIC BLOOD PRESSURE: 68 MMHG | RESPIRATION RATE: 18 BRPM | SYSTOLIC BLOOD PRESSURE: 118 MMHG | TEMPERATURE: 99 F | HEART RATE: 73 BPM | BODY MASS INDEX: 30.87 KG/M2 | WEIGHT: 197.06 LBS

## 2020-02-07 DIAGNOSIS — G89.4 CHRONIC PAIN DISORDER: ICD-10-CM

## 2020-02-07 DIAGNOSIS — M51.36 DDD (DEGENERATIVE DISC DISEASE), LUMBAR: Primary | ICD-10-CM

## 2020-02-07 DIAGNOSIS — M54.16 LUMBAR RADICULITIS: ICD-10-CM

## 2020-02-07 PROCEDURE — 99214 OFFICE O/P EST MOD 30 MIN: CPT | Mod: PBBFAC,PN | Performed by: PHYSICIAN ASSISTANT

## 2020-02-07 PROCEDURE — 99999 PR PBB SHADOW E&M-EST. PATIENT-LVL IV: ICD-10-PCS | Mod: PBBFAC,,, | Performed by: PHYSICIAN ASSISTANT

## 2020-02-07 PROCEDURE — 99999 PR PBB SHADOW E&M-EST. PATIENT-LVL IV: CPT | Mod: PBBFAC,,, | Performed by: PHYSICIAN ASSISTANT

## 2020-02-07 PROCEDURE — 99213 PR OFFICE/OUTPT VISIT, EST, LEVL III, 20-29 MIN: ICD-10-PCS | Mod: S$PBB,,, | Performed by: PHYSICIAN ASSISTANT

## 2020-02-07 PROCEDURE — 99213 OFFICE O/P EST LOW 20 MIN: CPT | Mod: S$PBB,,, | Performed by: PHYSICIAN ASSISTANT

## 2020-02-07 NOTE — PROGRESS NOTES
This note was completed with dictation software and grammatical errors may exist.    CC: back and leg pain    HPI: The patient is a 69 year-old Female with a history of colon CA, sleep apnea, anemia, depression and postlaminectomy syndrome now with myelodysplastic syndrome who presents in referral from Dr. Villanueva.  She returns in follow-up today with severe right leg pain. This started a few days ago and has progressively gotten worse.  She was not using her spinal cord stimulator and turned this on without relief.  She describes sharp and shooting pain in the right buttock and right posterior thigh.  It is severe with putting weight her right side and improved with sitting.    Pain intervention history:  She is status post caudal epidural steroid injection on 5/7/14 with 90% relief lasting 3 days, then the pain slowly came back over 7 days.  She is status post caudal epidural steroid injection on 6/17/14 with 70% relief.  She is status post caudal epidural steroid injection on 9/10/15 with almost complete relief lasting 5-6 days.  She is status post left L3 and L5 transforaminal epidural steroid injections on 10/14/15 with 0% relief.  She is status post left L4 transforaminal epidural steroid injection on 1/13/16 with about 90% relief lasting one week.  She underwent removal spinal cord stimulator and L4/5 hardware on 3/16/16 with Dr. Aldrich.  She is status post caudal epidural steroid injection on 4/25/17 with 0% relief.  She is status post bilateral L5/S1 facet joint injections on 7/14/17 with 0% relief.  She is status post C7-T1 interlaminar epidural steroid injection on 08/13/2019 with 0% relief.      ROS:She reports fatigue, joint pain.  Balance of review of systems is negative.    Medical, surgical, family and social history reviewed elsewhere in record.    Medications/Allergies: See med card    Vitals:    02/07/20 1333   BP: 118/68   Pulse: 73   Resp: 18   Temp: 98.6 °F (37 °C)   TempSrc: Oral    Weight: 89.4 kg (197 lb 1.5 oz)   PainSc:   8   PainLoc: Back         Physical exam:  Gen: A and O x3, pleasant, well-groomed  Skin: No rashes or obvious lesions  HEENT: PERRLA  CVS: Regular rate and rhythm, normal S1 and S2, no murmurs.  Resp: Clear to auscultation bilaterally, no wheezes or rales.  Abdomen: Soft, NT/ND, normal bowel sounds present.  Musculoskeletal:  Antalgic gait due to right leg pain. Presents in a wheelchair but able to walk on her own.    Neuro:  Upper extremities: 5/5 strength bilaterally   Lower extremities: 5/5 strength bilaterally  Reflexes: Brachioradialis 2+, Bicep 2+, Tricep 2+. Patellar 0+, Achilles 2+ bilaterally.  Sensory: Intact and symmetrical to light touch and pinprick in C2-T1 dermatomes bilaterally. Intact and symmetrical to light touch and pinprick in L2-S1 dermatomes bilaterally.    Lumbar spine:  Range of motion is moderately limited with flexion and extension without increased pain.  Luca's test causes back pain only.  Straight leg raise causes right buttock pain  Internal/external rotation of the hip is negative bilaterally.  Myofascial exam:  No tenderness to palpation to the lumbar paraspinous muscles.    Imaging:  MRI LUMBAR SPINE W/WO CONTRAST ON 8/22/06  1. MINIMAL L3-4 DISC BULGE CAUSING MINIMAL THECAL SAC COMPRESSION, AND THERE IS ALSO MILD BILATERAL DEGENERATIVE FACET ARTHROSIS.   2. L4-5 DISCECTOMY AND L5 LAMINECTOMY WITH NO EVIDENCE OF A SIGNIFICANT SPINAL CANAL COMPROMISE. THERE IS MILD BILATERAL FORAMINAL NARROWING AT THE L4-5 LEVEL   3. DEGENERATION AND MILD BULGING OF THE L5-S1 DISC WITH ASSOCIATED MILD THECAL SAC COMPRESSION.   4. ROUNDED MASS ARISING FROM THE LEFT KIDNEY WHICH MOST LIKELY REPRESENTS A CYST. ULTRASOUND IS RECOMMENDED TO EXCLUDE A RENAL NEOPLASM.    2/11/14 x-ray tib-fib:  No obvious right tibial or fibular fracture is noted. No worrisome abnormality is noted. A linear metallic density is noted on the lateral view overlying the forefoot  dorsally near the navicular cuneiform articulation. This could relate to a foreign body or overlying density relating to clothing. Please clinically correlate a small quadriceps patellar spur is noted.    10/1/15 CT lumbar spine  T12/L1: Mild disk bulging is evident and degenerative facet changes are noted at this level. The canal and foramina are grossly intact.  L1/L2: Mild annular disk bulge and mild degenerative facet changes produce only minimal canal and foraminal distortion to  L2/L3: Degenerative facet changes noted in the mild annular disk and facet disease.  L3/L4: Posterior canal ligamentous hypertrophy and degenerative facet changes are present. There is moderate annular disk bulging. This combines to produce mild canal and left greater than right foraminal narrowing. This is slightly worsened from the previous exam.  L4/L5: The canal is grossly intact as the surgical level. Degenerative facet changes are noted bilaterally.  L5/S1: Moderate degenerative facet changes noted bilaterally and there is suggestion of disk bulging with left greater than right foraminal narrowing.    11/16/16 MRI lumbar spine with and without contrast  There have been prior laminectomies at L4 and L5, with interbody spacer present at L4-5.  Vertebral body height and alignment are within normal limits.  Marrow signal is heterogeneous.  There is moderate marrow enhancement within the L4 and L5 vertebral bodies centered about the disc space which is likely reactive.  The conus terminates at T12-L1.  Disc desiccation is present throughout the lumbar spine.  Mild loss of disc height is present at the L5-S1 level.  L1-L2:  No disc herniation. No spinal canal or neuroforaminal narrowing.  L2-L3:  No disc herniation. No spinal canal or neuroforaminal narrowing.  L3-L4:  There is mild diffuse disc bulging and moderate facet arthropathy with significant thickening of the ligament of flavum.  The findings contribute to mild spinal canal  narrowing without significant neuroforaminal narrowing.  L4-L5:  Moderate bilateral neuroforaminal narrowing is present which appears predominantly due to facet arthropathy, with some contribution from enhancing scar tissue formation within the neuroforamen on the right.  No spinal canal narrowing.  L5-S1:  There is mild broad-based posterior bulging of the disc, abdomen and moderate bilateral facet arthropathy.  There is resultant mild bilateral neuroforaminal narrowing.    03/28/2018 CT cervical spine  Multiple computerized images of the cervical spine were obtained in axial projection using thin slices.  No intravenous contrast was administered.  Study was viewed at soft tissue and bone window settings.  Reconstructions were done in coronal and sagittal planes.  There is no apparent fracture or subluxation.  The prevertebral soft tissue space is not widened.  There are degenerative changes present involving mainly the C5-C6 and C6-C7 levels. Findings consistent of vertebral body osteophyte formation and narrowing of the disc spaces.      Assessment:  The patient is a 69year-old Female with a history of colon CA, sleep apnea, anemia, depression and postlaminectomy syndrome now with myelodysplastic syndrome who presents in referral from Dr. Villanueva.  1. DDD (degenerative disc disease), lumbar     2. Lumbar radiculitis     3. Chronic pain disorder         Plan:  1.  I have spoken with the spinal cord stimulator representative with [employer] who is going to move coverage to the right side for the patient. She will contact me on Monday and if she is still having difficulty I am going to update her lumbar spine CT for further evaluation.    Greater than 50% of this 15 min visit was spent counseling patient.

## 2020-02-09 ENCOUNTER — PATIENT OUTREACH (OUTPATIENT)
Dept: ADMINISTRATIVE | Facility: OTHER | Age: 70
End: 2020-02-09

## 2020-02-09 ENCOUNTER — PATIENT MESSAGE (OUTPATIENT)
Dept: PAIN MEDICINE | Facility: CLINIC | Age: 70
End: 2020-02-09

## 2020-02-10 ENCOUNTER — LAB VISIT (OUTPATIENT)
Dept: LAB | Facility: HOSPITAL | Age: 70
End: 2020-02-10
Attending: PHYSICIAN ASSISTANT
Payer: MEDICARE

## 2020-02-10 ENCOUNTER — TELEPHONE (OUTPATIENT)
Dept: PAIN MEDICINE | Facility: CLINIC | Age: 70
End: 2020-02-10

## 2020-02-10 DIAGNOSIS — D46.9 MDS (MYELODYSPLASTIC SYNDROME): ICD-10-CM

## 2020-02-10 LAB
ALBUMIN SERPL BCP-MCNC: 3.9 G/DL (ref 3.5–5.2)
ALP SERPL-CCNC: 77 U/L (ref 38–145)
ALT SERPL W/O P-5'-P-CCNC: 19 U/L (ref 0–35)
ANION GAP SERPL CALC-SCNC: 6 MMOL/L (ref 8–16)
AST SERPL-CCNC: 22 U/L (ref 14–36)
BASOPHILS # BLD AUTO: 0.02 K/UL (ref 0–0.2)
BASOPHILS NFR BLD: 0.7 % (ref 0–1.9)
BILIRUB SERPL-MCNC: 0.4 MG/DL (ref 0.2–1.3)
BUN SERPL-MCNC: 13 MG/DL (ref 7–18)
CALCIUM SERPL-MCNC: 8.9 MG/DL (ref 8.4–10.2)
CHLORIDE SERPL-SCNC: 107 MMOL/L (ref 95–110)
CO2 SERPL-SCNC: 30 MMOL/L (ref 22–31)
CREAT SERPL-MCNC: 0.7 MG/DL (ref 0.5–1.4)
DIFFERENTIAL METHOD: ABNORMAL
EOSINOPHIL # BLD AUTO: 0 K/UL (ref 0–0.5)
EOSINOPHIL NFR BLD: 1.4 % (ref 0–8)
ERYTHROCYTE [DISTWIDTH] IN BLOOD BY AUTOMATED COUNT: 13.8 % (ref 11.5–14.5)
EST. GFR  (AFRICAN AMERICAN): >60 ML/MIN/1.73 M^2
EST. GFR  (NON AFRICAN AMERICAN): >60 ML/MIN/1.73 M^2
GLUCOSE SERPL-MCNC: 113 MG/DL (ref 70–110)
HCT VFR BLD AUTO: 31.1 % (ref 37–48.5)
HGB BLD-MCNC: 10 G/DL (ref 12–16)
IMM GRANULOCYTES # BLD AUTO: 0 K/UL (ref 0–0.04)
IMM GRANULOCYTES NFR BLD AUTO: 0 % (ref 0–0.5)
LDH SERPL L TO P-CCNC: 436 U/L (ref 313–618)
LYMPHOCYTES # BLD AUTO: 1.1 K/UL (ref 1–4.8)
LYMPHOCYTES NFR BLD: 37.8 % (ref 18–48)
MCH RBC QN AUTO: 37.6 PG (ref 27–31)
MCHC RBC AUTO-ENTMCNC: 32.2 G/DL (ref 32–36)
MCV RBC AUTO: 117 FL (ref 82–98)
MONOCYTES # BLD AUTO: 0.3 K/UL (ref 0.3–1)
MONOCYTES NFR BLD: 8.7 % (ref 4–15)
NEUTROPHILS # BLD AUTO: 1.5 K/UL (ref 1.8–7.7)
NEUTROPHILS NFR BLD: 51.4 % (ref 38–73)
NRBC BLD-RTO: 0 /100 WBC
PLATELET # BLD AUTO: 106 K/UL (ref 150–350)
PMV BLD AUTO: 10.1 FL (ref 9.2–12.9)
POTASSIUM SERPL-SCNC: 4.4 MMOL/L (ref 3.5–5.1)
PROT SERPL-MCNC: 6.5 G/DL (ref 6–8.4)
RBC # BLD AUTO: 2.66 M/UL (ref 4–5.4)
SODIUM SERPL-SCNC: 143 MMOL/L (ref 136–145)
WBC # BLD AUTO: 2.86 K/UL (ref 3.9–12.7)

## 2020-02-10 PROCEDURE — 83615 LACTATE (LD) (LDH) ENZYME: CPT

## 2020-02-10 PROCEDURE — 85025 COMPLETE CBC W/AUTO DIFF WBC: CPT | Mod: PN

## 2020-02-10 PROCEDURE — 80053 COMPREHEN METABOLIC PANEL: CPT

## 2020-02-10 PROCEDURE — 36415 COLL VENOUS BLD VENIPUNCTURE: CPT | Mod: PN

## 2020-02-10 PROCEDURE — 80053 COMPREHEN METABOLIC PANEL: CPT | Mod: PN

## 2020-02-10 PROCEDURE — 85025 COMPLETE CBC W/AUTO DIFF WBC: CPT

## 2020-02-10 PROCEDURE — 83615 LACTATE (LD) (LDH) ENZYME: CPT | Mod: PN

## 2020-02-10 RX ORDER — HYOSCYAMINE SULFATE 0.12 MG/1
TABLET SUBLINGUAL
Qty: 300 TABLET | Refills: 5 | Status: SHIPPED | OUTPATIENT
Start: 2020-02-10

## 2020-02-10 NOTE — TELEPHONE ENCOUNTER
Patient notified of below per Haroon Hernandez:    Since you have achieved some benefit so far with the changes, please keep communicating with Nitin to see how far we get.  However if you do not get any more relief than you have right now with further adjustments, please let me know and I will order the CT.      Patient verbalized understanding.

## 2020-02-10 NOTE — TELEPHONE ENCOUNTER
"Please see "patient message" encounter from yesterday and let her know what I suggested.  It looks like she has not read this yet.  "

## 2020-02-10 NOTE — TELEPHONE ENCOUNTER
----- Message from Colleen Lopez LPN sent at 2/10/2020  3:08 PM CST -----  Contact: patient  Please advise? She states that she was supposed to have a CT as well  ----- Message -----  From: Catherine Lee  Sent: 2/10/2020  11:22 AM CST  To: [facility] Staff    Type: Needs Medical Advice    Who Called:  Patient  Best Call Back Number: [phone] (home)   Additional Information: the pt wants to know if the Dr wants her to go in for a CT scan asked to be called back to advise asap said she thought she was supp to have one

## 2020-02-11 DIAGNOSIS — F33.0 MDD (MAJOR DEPRESSIVE DISORDER), RECURRENT EPISODE, MILD: ICD-10-CM

## 2020-02-11 RX ORDER — BUPROPION HYDROCHLORIDE 300 MG/1
TABLET ORAL
Qty: 90 TABLET | Refills: 0 | OUTPATIENT
Start: 2020-02-11

## 2020-02-20 DIAGNOSIS — F33.0 MDD (MAJOR DEPRESSIVE DISORDER), RECURRENT EPISODE, MILD: ICD-10-CM

## 2020-02-20 RX ORDER — BUPROPION HYDROCHLORIDE 300 MG/1
TABLET ORAL
Qty: 90 TABLET | Refills: 1 | Status: SHIPPED | OUTPATIENT
Start: 2020-02-20

## 2020-02-21 ENCOUNTER — PES CALL (OUTPATIENT)
Dept: ADMINISTRATIVE | Facility: CLINIC | Age: 70
End: 2020-02-21

## 2020-02-24 ENCOUNTER — INFUSION (OUTPATIENT)
Dept: INFUSION THERAPY | Facility: HOSPITAL | Age: 70
End: 2020-02-24
Attending: INTERNAL MEDICINE
Payer: MEDICARE

## 2020-02-24 VITALS
BODY MASS INDEX: 30.42 KG/M2 | DIASTOLIC BLOOD PRESSURE: 74 MMHG | SYSTOLIC BLOOD PRESSURE: 124 MMHG | HEART RATE: 67 BPM | RESPIRATION RATE: 20 BRPM | WEIGHT: 194.25 LBS

## 2020-02-24 DIAGNOSIS — D46.9 MDS (MYELODYSPLASTIC SYNDROME): Primary | ICD-10-CM

## 2020-02-24 DIAGNOSIS — Z23 NEED FOR RABIES VACCINATION: ICD-10-CM

## 2020-02-24 PROCEDURE — 63600175 PHARM REV CODE 636 W HCPCS: Mod: PN | Performed by: PHYSICIAN ASSISTANT

## 2020-02-24 PROCEDURE — 96372 THER/PROPH/DIAG INJ SC/IM: CPT | Mod: PN

## 2020-02-24 RX ORDER — GABAPENTIN 300 MG/1
600 CAPSULE ORAL 2 TIMES DAILY
Qty: 360 CAPSULE | Refills: 3 | Status: SHIPPED | OUTPATIENT
Start: 2020-02-24

## 2020-02-24 RX ADMIN — EPOETIN ALFA-EPBX 40000 UNITS: 40000 INJECTION, SOLUTION INTRAVENOUS; SUBCUTANEOUS at 10:02

## 2020-03-01 RX ORDER — GABAPENTIN 300 MG/1
CAPSULE ORAL
Qty: 360 CAPSULE | Refills: 3 | Status: SHIPPED | OUTPATIENT
Start: 2020-03-01

## 2020-03-27 DIAGNOSIS — E03.9 HYPOTHYROIDISM, UNSPECIFIED TYPE: ICD-10-CM

## 2020-03-27 RX ORDER — LEVOTHYROXINE SODIUM 100 UG/1
TABLET ORAL
Qty: 90 TABLET | Refills: 1 | Status: SHIPPED | OUTPATIENT
Start: 2020-03-27

## 2020-04-23 DIAGNOSIS — F33.0 MDD (MAJOR DEPRESSIVE DISORDER), RECURRENT EPISODE, MILD: ICD-10-CM

## 2020-04-23 DIAGNOSIS — F41.1 GAD (GENERALIZED ANXIETY DISORDER): ICD-10-CM

## 2020-04-24 RX ORDER — FLUOXETINE HYDROCHLORIDE 40 MG/1
CAPSULE ORAL
Qty: 90 CAPSULE | Refills: 1 | Status: SHIPPED | OUTPATIENT
Start: 2020-04-24

## 2020-05-24 DIAGNOSIS — F41.1 GAD (GENERALIZED ANXIETY DISORDER): ICD-10-CM

## 2020-05-24 DIAGNOSIS — F33.0 MDD (MAJOR DEPRESSIVE DISORDER), RECURRENT EPISODE, MILD: ICD-10-CM

## 2020-05-29 RX ORDER — FLUOXETINE HYDROCHLORIDE 40 MG/1
CAPSULE ORAL
Qty: 90 CAPSULE | Refills: 1 | OUTPATIENT
Start: 2020-05-29

## 2020-10-05 ENCOUNTER — PATIENT MESSAGE (OUTPATIENT)
Dept: ADMINISTRATIVE | Facility: HOSPITAL | Age: 70
End: 2020-10-05

## 2021-01-04 ENCOUNTER — PATIENT MESSAGE (OUTPATIENT)
Dept: ADMINISTRATIVE | Facility: HOSPITAL | Age: 71
End: 2021-01-04

## 2021-03-24 ENCOUNTER — APPOINTMENT (OUTPATIENT)
Age: 71
Setting detail: DERMATOLOGY
End: 2021-04-13

## 2021-03-24 VITALS — RESPIRATION RATE: 18 BRPM | HEIGHT: 67 IN | TEMPERATURE: 98.2 F | WEIGHT: 167 LBS

## 2021-03-24 DIAGNOSIS — L30.4 ERYTHEMA INTERTRIGO: ICD-10-CM

## 2021-03-24 DIAGNOSIS — D22 MELANOCYTIC NEVI: ICD-10-CM

## 2021-03-24 DIAGNOSIS — D18.0 HEMANGIOMA: ICD-10-CM

## 2021-03-24 DIAGNOSIS — L81.4 OTHER MELANIN HYPERPIGMENTATION: ICD-10-CM

## 2021-03-24 DIAGNOSIS — L82.0 INFLAMED SEBORRHEIC KERATOSIS: ICD-10-CM

## 2021-03-24 DIAGNOSIS — Z85.828 PERSONAL HISTORY OF OTHER MALIGNANT NEOPLASM OF SKIN: ICD-10-CM

## 2021-03-24 DIAGNOSIS — Z71.89 OTHER SPECIFIED COUNSELING: ICD-10-CM

## 2021-03-24 DIAGNOSIS — L82.1 OTHER SEBORRHEIC KERATOSIS: ICD-10-CM

## 2021-03-24 PROBLEM — D22.5 MELANOCYTIC NEVI OF TRUNK: Status: ACTIVE | Noted: 2021-03-24

## 2021-03-24 PROBLEM — D18.01 HEMANGIOMA OF SKIN AND SUBCUTANEOUS TISSUE: Status: ACTIVE | Noted: 2021-03-24

## 2021-03-24 PROCEDURE — OTHER ADDITIONAL NOTES: OTHER

## 2021-03-24 PROCEDURE — 99203 OFFICE O/P NEW LOW 30 MIN: CPT | Mod: 25

## 2021-03-24 PROCEDURE — OTHER PRESCRIPTION MEDICATION MANAGEMENT: OTHER

## 2021-03-24 PROCEDURE — OTHER LIQUID NITROGEN: OTHER

## 2021-03-24 PROCEDURE — OTHER MIPS QUALITY: OTHER

## 2021-03-24 PROCEDURE — OTHER COUNSELING: OTHER

## 2021-03-24 PROCEDURE — OTHER PRESCRIPTION: OTHER

## 2021-03-24 PROCEDURE — 17110 DESTRUCT B9 LESION 1-14: CPT

## 2021-03-24 RX ORDER — NYSTATIN 100000 [USP'U]/G
CREAM TOPICAL TWICE A DAY
Qty: 1 | Refills: 3 | Status: ERX | COMMUNITY
Start: 2021-03-24

## 2021-03-24 ASSESSMENT — LOCATION SIMPLE DESCRIPTION DERM
LOCATION SIMPLE: RIGHT BREAST
LOCATION SIMPLE: UPPER BACK
LOCATION SIMPLE: ABDOMEN
LOCATION SIMPLE: LEFT UPPER BACK
LOCATION SIMPLE: LEFT BREAST
LOCATION SIMPLE: CHEST
LOCATION SIMPLE: RIGHT CHEEK
LOCATION SIMPLE: RIGHT UPPER BACK
LOCATION SIMPLE: RIGHT ANTERIOR NECK

## 2021-03-24 ASSESSMENT — LOCATION DETAILED DESCRIPTION DERM
LOCATION DETAILED: RIGHT CENTRAL MALAR CHEEK
LOCATION DETAILED: LEFT INFERIOR MEDIAL UPPER BACK
LOCATION DETAILED: LEFT LATERAL SUPERIOR CHEST
LOCATION DETAILED: LEFT MEDIAL SUPERIOR CHEST
LOCATION DETAILED: RIGHT CLAVICULAR NECK
LOCATION DETAILED: XIPHOID
LOCATION DETAILED: RIGHT MID-UPPER BACK
LOCATION DETAILED: RIGHT INFERIOR MEDIAL UPPER BACK
LOCATION DETAILED: EPIGASTRIC SKIN
LOCATION DETAILED: INFERIOR THORACIC SPINE
LOCATION DETAILED: PERIUMBILICAL SKIN
LOCATION DETAILED: RIGHT MEDIAL BREAST 5-6:00 REGION
LOCATION DETAILED: LEFT INFRAMAMMARY CREASE (INNER QUADRANT)
LOCATION DETAILED: LEFT MEDIAL UPPER BACK
LOCATION DETAILED: SUPERIOR THORACIC SPINE

## 2021-03-24 ASSESSMENT — LOCATION ZONE DERM
LOCATION ZONE: FACE
LOCATION ZONE: TRUNK
LOCATION ZONE: NECK

## 2021-03-24 NOTE — PROCEDURE: PRESCRIPTION MEDICATION MANAGEMENT
Render In Strict Bullet Format?: No
Plan: Will re-evaluate as needed
Initiate Treatment: Nystatin cream, apply to affected areas under breast twice daily until resolved, then one week past clearance
Detail Level: Zone

## 2021-03-24 NOTE — PROCEDURE: LIQUID NITROGEN
Add 52 Modifier (Optional): no
Consent: The patient's consent was obtained including but not limited to risks of crusting, scabbing, blistering, scarring, darker or lighter pigmentary change, recurrence, incomplete removal and infection.
Detail Level: Zone
Medical Necessity Clause: This procedure was medically necessary because the lesions that were treated were:
Medical Necessity Information: It is in your best interest to select a reason for this procedure from the list below. All of these items fulfill various CMS LCD requirements except the new and changing color options.
Post-Care Instructions: I reviewed with the patient in detail post-care instructions. Patient is to wear sunprotection, and avoid picking at any of the treated lesions. Pt may apply Vaseline to crusted or scabbing areas.

## 2021-03-24 NOTE — PROCEDURE: MIPS QUALITY
Quality 226: Preventive Care And Screening: Tobacco Use: Screening And Cessation Intervention: Patient screened for tobacco use and is an ex/non-smoker
Detail Level: Detailed
Quality 130: Documentation Of Current Medications In The Medical Record: Current Medications Documented
Quality 431: Preventive Care And Screening: Unhealthy Alcohol Use - Screening: Patient screened for unhealthy alcohol use using a single question and scores less than 2 times per year
Quality 111:Pneumonia Vaccination Status For Older Adults: Pneumococcal Vaccination Previously Received

## 2021-03-24 NOTE — PROCEDURE: ADDITIONAL NOTES
Additional Notes: Right inferior eyelid- Mohs, years ago in [place] Additional Notes: Right inferior eyelid- Mohs, years ago in [place]

## 2021-04-06 ENCOUNTER — PATIENT MESSAGE (OUTPATIENT)
Dept: ADMINISTRATIVE | Facility: HOSPITAL | Age: 71
End: 2021-04-06

## 2021-04-15 NOTE — TELEPHONE ENCOUNTER
Please advise.   Admission Reconciliation is Not Complete  Discharge Reconciliation is Completed Admission Reconciliation is Completed  Discharge Reconciliation is Completed

## 2021-05-06 ENCOUNTER — PATIENT MESSAGE (OUTPATIENT)
Dept: RESEARCH | Facility: HOSPITAL | Age: 71
End: 2021-05-06

## 2021-06-02 ENCOUNTER — APPOINTMENT (OUTPATIENT)
Dept: URBAN - METROPOLITAN AREA CLINIC 265 | Age: 71
Setting detail: DERMATOLOGY
End: 2021-06-13

## 2021-06-02 VITALS — HEIGHT: 67 IN | TEMPERATURE: 98.4 F | RESPIRATION RATE: 18 BRPM | WEIGHT: 164 LBS

## 2021-06-02 DIAGNOSIS — L82.0 INFLAMED SEBORRHEIC KERATOSIS: ICD-10-CM

## 2021-06-02 PROCEDURE — 17110 DESTRUCT B9 LESION 1-14: CPT

## 2021-06-02 PROCEDURE — OTHER COUNSELING: OTHER

## 2021-06-02 PROCEDURE — OTHER LIQUID NITROGEN: OTHER

## 2021-06-02 ASSESSMENT — LOCATION SIMPLE DESCRIPTION DERM
LOCATION SIMPLE: LEFT UPPER BACK
LOCATION SIMPLE: RIGHT UPPER BACK
LOCATION SIMPLE: UPPER BACK
LOCATION SIMPLE: RIGHT BREAST

## 2021-06-02 ASSESSMENT — LOCATION DETAILED DESCRIPTION DERM
LOCATION DETAILED: INFERIOR THORACIC SPINE
LOCATION DETAILED: LEFT SUPERIOR UPPER BACK
LOCATION DETAILED: RIGHT MID-UPPER BACK
LOCATION DETAILED: LEFT MEDIAL UPPER BACK
LOCATION DETAILED: RIGHT INFERIOR MEDIAL UPPER BACK
LOCATION DETAILED: RIGHT AXILLARY TAIL OF BREAST
LOCATION DETAILED: LEFT INFERIOR MEDIAL UPPER BACK

## 2021-06-02 ASSESSMENT — LOCATION ZONE DERM: LOCATION ZONE: TRUNK

## 2021-06-02 NOTE — PROCEDURE: LIQUID NITROGEN
Detail Level: Zone
Medical Necessity Information: It is in your best interest to select a reason for this procedure from the list below. All of these items fulfill various CMS LCD requirements except the new and changing color options.
Include Z78.9 (Other Specified Conditions Influencing Health Status) As An Associated Diagnosis?: No
Post-Care Instructions: I reviewed with the patient in detail post-care instructions. Patient is to wear sunprotection, and avoid picking at any of the treated lesions. Pt may apply Vaseline to crusted or scabbing areas.
Consent: The patient's consent was obtained including but not limited to risks of crusting, scabbing, blistering, scarring, darker or lighter pigmentary change, recurrence, incomplete removal and infection.
Medical Necessity Clause: This procedure was medically necessary because the lesions that were treated were:

## 2021-07-07 ENCOUNTER — PATIENT MESSAGE (OUTPATIENT)
Dept: ADMINISTRATIVE | Facility: HOSPITAL | Age: 71
End: 2021-07-07

## 2021-08-17 ENCOUNTER — APPOINTMENT (OUTPATIENT)
Dept: URBAN - METROPOLITAN AREA CLINIC 265 | Age: 71
Setting detail: DERMATOLOGY
End: 2021-08-20

## 2021-08-17 VITALS — WEIGHT: 157 LBS | HEIGHT: 67 IN | RESPIRATION RATE: 18 BRPM

## 2021-08-17 DIAGNOSIS — L82.0 INFLAMED SEBORRHEIC KERATOSIS: ICD-10-CM

## 2021-08-17 PROCEDURE — 17110 DESTRUCT B9 LESION 1-14: CPT

## 2021-08-17 PROCEDURE — OTHER COUNSELING: OTHER

## 2021-08-17 PROCEDURE — OTHER LIQUID NITROGEN: OTHER

## 2021-08-17 ASSESSMENT — LOCATION DETAILED DESCRIPTION DERM
LOCATION DETAILED: LEFT MEDIAL SUPERIOR CHEST
LOCATION DETAILED: LEFT LATERAL BREAST 2-3:00 REGION
LOCATION DETAILED: LEFT MID-UPPER BACK
LOCATION DETAILED: LEFT PROXIMAL POSTERIOR UPPER ARM
LOCATION DETAILED: RIGHT LATERAL UPPER BACK

## 2021-08-17 ASSESSMENT — LOCATION ZONE DERM
LOCATION ZONE: ARM
LOCATION ZONE: TRUNK

## 2021-08-17 ASSESSMENT — LOCATION SIMPLE DESCRIPTION DERM
LOCATION SIMPLE: LEFT POSTERIOR UPPER ARM
LOCATION SIMPLE: LEFT BREAST
LOCATION SIMPLE: RIGHT UPPER BACK
LOCATION SIMPLE: LEFT UPPER BACK
LOCATION SIMPLE: CHEST

## 2021-08-17 NOTE — PROCEDURE: LIQUID NITROGEN
Add 52 Modifier (Optional): no
Post-Care Instructions: I reviewed with the patient in detail post-care instructions. Patient is to wear sunprotection, and avoid picking at any of the treated lesions. Pt may apply Vaseline to crusted or scabbing areas.
Show Topical Anesthesia Variable?: Yes
Consent: The patient's consent was obtained including but not limited to risks of crusting, scabbing, blistering, scarring, darker or lighter pigmentary change, recurrence, incomplete removal and infection.
Detail Level: Detailed
Medical Necessity Clause: This procedure was medically necessary because the lesions that were treated were:
Medical Necessity Information: It is in your best interest to select a reason for this procedure from the list below. All of these items fulfill various CMS LCD requirements except the new and changing color options.

## 2022-03-25 ENCOUNTER — APPOINTMENT (OUTPATIENT)
Dept: URBAN - METROPOLITAN AREA CLINIC 265 | Age: 72
Setting detail: DERMATOLOGY
End: 2022-03-25

## 2022-03-25 VITALS — WEIGHT: 145 LBS | HEIGHT: 55 IN

## 2022-03-25 DIAGNOSIS — L82.0 INFLAMED SEBORRHEIC KERATOSIS: ICD-10-CM

## 2022-03-25 DIAGNOSIS — Z71.89 OTHER SPECIFIED COUNSELING: ICD-10-CM

## 2022-03-25 DIAGNOSIS — L81.4 OTHER MELANIN HYPERPIGMENTATION: ICD-10-CM

## 2022-03-25 DIAGNOSIS — L82.1 OTHER SEBORRHEIC KERATOSIS: ICD-10-CM

## 2022-03-25 DIAGNOSIS — Z85.828 PERSONAL HISTORY OF OTHER MALIGNANT NEOPLASM OF SKIN: ICD-10-CM

## 2022-03-25 DIAGNOSIS — D18.0 HEMANGIOMA: ICD-10-CM

## 2022-03-25 DIAGNOSIS — D49.2 NEOPLASM OF UNSPECIFIED BEHAVIOR OF BONE, SOFT TISSUE, AND SKIN: ICD-10-CM

## 2022-03-25 DIAGNOSIS — D22 MELANOCYTIC NEVI: ICD-10-CM

## 2022-03-25 PROBLEM — D18.01 HEMANGIOMA OF SKIN AND SUBCUTANEOUS TISSUE: Status: ACTIVE | Noted: 2022-03-25

## 2022-03-25 PROBLEM — D22.5 MELANOCYTIC NEVI OF TRUNK: Status: ACTIVE | Noted: 2022-03-25

## 2022-03-25 PROCEDURE — 17110 DESTRUCT B9 LESION 1-14: CPT

## 2022-03-25 PROCEDURE — OTHER ADDITIONAL NOTES: OTHER

## 2022-03-25 PROCEDURE — OTHER COUNSELING: OTHER

## 2022-03-25 PROCEDURE — 11102 TANGNTL BX SKIN SINGLE LES: CPT | Mod: 59

## 2022-03-25 PROCEDURE — 11103 TANGNTL BX SKIN EA SEP/ADDL: CPT | Mod: 59

## 2022-03-25 PROCEDURE — OTHER MIPS QUALITY: OTHER

## 2022-03-25 PROCEDURE — OTHER LIQUID NITROGEN: OTHER

## 2022-03-25 PROCEDURE — OTHER BIOPSY BY SHAVE METHOD: OTHER

## 2022-03-25 PROCEDURE — 99213 OFFICE O/P EST LOW 20 MIN: CPT | Mod: 25

## 2022-03-25 ASSESSMENT — LOCATION DETAILED DESCRIPTION DERM
LOCATION DETAILED: PERIUMBILICAL SKIN
LOCATION DETAILED: RIGHT INFERIOR MEDIAL LOWER BACK
LOCATION DETAILED: LEFT ANTERIOR SHOULDER
LOCATION DETAILED: RIGHT LATERAL FOREHEAD
LOCATION DETAILED: RIGHT LATERAL MALAR CHEEK
LOCATION DETAILED: LEFT LATERAL FOREHEAD
LOCATION DETAILED: RIGHT INFERIOR LATERAL FOREHEAD
LOCATION DETAILED: EPIGASTRIC SKIN
LOCATION DETAILED: RIGHT CENTRAL MALAR CHEEK
LOCATION DETAILED: RIGHT INFERIOR MEDIAL UPPER BACK

## 2022-03-25 ASSESSMENT — LOCATION ZONE DERM
LOCATION ZONE: TRUNK
LOCATION ZONE: FACE
LOCATION ZONE: ARM

## 2022-03-25 ASSESSMENT — LOCATION SIMPLE DESCRIPTION DERM
LOCATION SIMPLE: RIGHT LOWER BACK
LOCATION SIMPLE: LEFT SHOULDER
LOCATION SIMPLE: RIGHT UPPER BACK
LOCATION SIMPLE: LEFT FOREHEAD
LOCATION SIMPLE: ABDOMEN
LOCATION SIMPLE: RIGHT FOREHEAD
LOCATION SIMPLE: RIGHT CHEEK

## 2022-03-25 NOTE — PROCEDURE: BIOPSY BY SHAVE METHOD
Hide Anesthesia Volume?: No
Billing Type: Third-Party Bill
Dressing: bandage
Consent: Written consent was obtained and risks were reviewed including but not limited to scarring, infection, bleeding, scabbing, incomplete removal, nerve damage and allergy to anesthesia.
Cryotherapy Text: The wound bed was treated with cryotherapy after the biopsy was performed.
Anesthesia Type: 1% lidocaine with epinephrine
Was A Bandage Applied: Yes
Biopsy Method: Dermablade
Curettage Text: The wound bed was treated with curettage after the biopsy was performed.
Wound Care: Petrolatum
Information: Selecting Yes will display possible errors in your note based on the variables you have selected. This validation is only offered as a suggestion for you. PLEASE NOTE THAT THE VALIDATION TEXT WILL BE REMOVED WHEN YOU FINALIZE YOUR NOTE. IF YOU WANT TO FAX A PRELIMINARY NOTE YOU WILL NEED TO TOGGLE THIS TO 'NO' IF YOU DO NOT WANT IT IN YOUR FAXED NOTE.
Electrodesiccation Text: The wound bed was treated with electrodesiccation after the biopsy was performed.
Silver Nitrate Text: The wound bed was treated with silver nitrate after the biopsy was performed.
Electrodesiccation And Curettage Text: The wound bed was treated with electrodesiccation and curettage after the biopsy was performed.
Additional Anesthesia Volume In Cc (Will Not Render If 0): 0
Biopsy Type: H and E
Type Of Destruction Used: Curettage
Depth Of Biopsy: dermis
Post-Care Instructions: I reviewed with the patient in detail post-care instructions. Patient is to keep the biopsy site dry overnight, and then apply bacitracin twice daily until healed. Patient may apply hydrogen peroxide soaks to remove any crusting.
Hemostasis: Drysol
Anesthesia Volume In Cc (Will Not Render If 0): 0.5
Notification Instructions: Patient will be notified of biopsy results. However, patient instructed to call the office if not contacted within 2 weeks.
Detail Level: Detailed

## 2022-03-25 NOTE — PROCEDURE: LIQUID NITROGEN
Include Z78.9 (Other Specified Conditions Influencing Health Status) As An Associated Diagnosis?: No
Medical Necessity Clause: This procedure was medically necessary because the lesions that were treated were:
Consent: The patient's consent was obtained including but not limited to risks of crusting, scabbing, blistering, scarring, darker or lighter pigmentary change, recurrence, incomplete removal and infection.
Medical Necessity Information: It is in your best interest to select a reason for this procedure from the list below. All of these items fulfill various CMS LCD requirements except the new and changing color options.
Detail Level: Detailed
Show Spray Paint Technique Variable?: Yes
Post-Care Instructions: I reviewed with the patient in detail post-care instructions. Patient is to wear sunprotection, and avoid picking at any of the treated lesions. Pt may apply Vaseline to crusted or scabbing areas.
Spray Paint Text: The liquid nitrogen was applied to the skin utilizing a spray paint frosting technique.

## 2022-03-25 NOTE — PROCEDURE: ADDITIONAL NOTES
Additional Notes: History of non-melanoma skin cancer: Right inferior eyelid- Mohs, years ago in [place] Additional Notes: History of non-melanoma skin cancer: Right inferior eyelid- Mohs, years ago in [place]

## 2022-10-12 ENCOUNTER — APPOINTMENT (OUTPATIENT)
Age: 72
Setting detail: DERMATOLOGY
End: 2022-10-12

## 2023-08-17 ENCOUNTER — HOSPITAL ENCOUNTER (EMERGENCY)
Facility: HOSPITAL | Age: 73
Discharge: HOME | End: 2023-08-17
Attending: EMERGENCY MEDICINE
Payer: MEDICARE

## 2023-08-17 VITALS
BODY MASS INDEX: 23.3 KG/M2 | DIASTOLIC BLOOD PRESSURE: 62 MMHG | WEIGHT: 145 LBS | SYSTOLIC BLOOD PRESSURE: 127 MMHG | TEMPERATURE: 98 F | OXYGEN SATURATION: 98 % | HEART RATE: 61 BPM | HEIGHT: 66 IN | RESPIRATION RATE: 12 BRPM

## 2023-08-17 DIAGNOSIS — R53.83 FATIGUE: Primary | ICD-10-CM

## 2023-08-17 DIAGNOSIS — Z86.2 HISTORY OF MYELODYSPLASTIC SYNDROME: ICD-10-CM

## 2023-08-17 DIAGNOSIS — D63.0 ANEMIA IN NEOPLASTIC DISEASE: ICD-10-CM

## 2023-08-17 DIAGNOSIS — R42 LIGHTHEADEDNESS: ICD-10-CM

## 2023-08-17 LAB
ABO + RH BLD: NORMAL
ALBUMIN SERPL BCP-MCNC: 3.8 G/DL (ref 3.5–5.2)
ALP SERPL-CCNC: 127 U/L (ref 55–135)
ALT SERPL W/O P-5'-P-CCNC: 19 U/L (ref 10–44)
ANION GAP SERPL CALC-SCNC: 12 MMOL/L (ref 8–16)
ANION GAP SERPL CALC-SCNC: 12 MMOL/L (ref 8–16)
ANISOCYTOSIS BLD QL SMEAR: SLIGHT
AST SERPL-CCNC: 19 U/L (ref 10–40)
BASOPHILS NFR BLD: 1 % (ref 0–1.9)
BILIRUB SERPL-MCNC: 1 MG/DL (ref 0.1–1)
BLD GP AB SCN CELLS X3 SERPL QL: NORMAL
BNP SERPL-MCNC: 252 PG/ML (ref 0–99)
BUN SERPL-MCNC: 8 MG/DL (ref 8–23)
BUN SERPL-MCNC: 8 MG/DL (ref 8–23)
CALCIUM SERPL-MCNC: 9 MG/DL (ref 8.7–10.5)
CALCIUM SERPL-MCNC: 9 MG/DL (ref 8.7–10.5)
CHLORIDE SERPL-SCNC: 106 MMOL/L (ref 95–110)
CHLORIDE SERPL-SCNC: 106 MMOL/L (ref 95–110)
CO2 SERPL-SCNC: 27 MMOL/L (ref 23–29)
CO2 SERPL-SCNC: 27 MMOL/L (ref 23–29)
CREAT SERPL-MCNC: 0.8 MG/DL (ref 0.5–1.4)
CREAT SERPL-MCNC: 0.8 MG/DL (ref 0.5–1.4)
DACRYOCYTES BLD QL SMEAR: ABNORMAL
DIFFERENTIAL METHOD: ABNORMAL
EOSINOPHIL NFR BLD: 2 % (ref 0–8)
ERYTHROCYTE [DISTWIDTH] IN BLOOD BY AUTOMATED COUNT: 22 % (ref 11.5–14.5)
EST. GFR  (NO RACE VARIABLE): >60 ML/MIN/1.73 M^2
EST. GFR  (NO RACE VARIABLE): >60 ML/MIN/1.73 M^2
GLUCOSE SERPL-MCNC: 105 MG/DL (ref 70–110)
GLUCOSE SERPL-MCNC: 105 MG/DL (ref 70–110)
HCT VFR BLD AUTO: 26.1 % (ref 37–48.5)
HCV AB SERPL QL IA: NORMAL
HGB BLD-MCNC: 8.6 G/DL (ref 12–16)
HIV 1+2 AB+HIV1 P24 AG SERPL QL IA: NORMAL
IMM GRANULOCYTES # BLD AUTO: ABNORMAL K/UL (ref 0–0.04)
IMM GRANULOCYTES NFR BLD AUTO: ABNORMAL % (ref 0–0.5)
LYMPHOCYTES NFR BLD: 41 % (ref 18–48)
MCH RBC QN AUTO: 34 PG (ref 27–31)
MCHC RBC AUTO-ENTMCNC: 33 G/DL (ref 32–36)
MCV RBC AUTO: 103 FL (ref 82–98)
MONOCYTES NFR BLD: 8 % (ref 4–15)
NEUTROPHILS NFR BLD: 44 % (ref 38–73)
NEUTS BAND NFR BLD MANUAL: 4 %
NRBC BLD-RTO: 0 /100 WBC
OVALOCYTES BLD QL SMEAR: ABNORMAL
PLATELET # BLD AUTO: 155 K/UL (ref 150–450)
PMV BLD AUTO: 10.1 FL (ref 9.2–12.9)
POIKILOCYTOSIS BLD QL SMEAR: SLIGHT
POLYCHROMASIA BLD QL SMEAR: ABNORMAL
POTASSIUM SERPL-SCNC: 3 MMOL/L (ref 3.5–5.1)
POTASSIUM SERPL-SCNC: 3 MMOL/L (ref 3.5–5.1)
PROT SERPL-MCNC: 6.7 G/DL (ref 6–8.4)
RBC # BLD AUTO: 2.53 M/UL (ref 4–5.4)
SMUDGE CELLS BLD QL SMEAR: PRESENT
SODIUM SERPL-SCNC: 145 MMOL/L (ref 136–145)
SODIUM SERPL-SCNC: 145 MMOL/L (ref 136–145)
SPECIMEN OUTDATE: NORMAL
TROPONIN I SERPL DL<=0.01 NG/ML-MCNC: 0.02 NG/ML (ref 0–0.03)
WBC # BLD AUTO: 1.57 K/UL (ref 3.9–12.7)

## 2023-08-17 PROCEDURE — 85007 BL SMEAR W/DIFF WBC COUNT: CPT | Performed by: EMERGENCY MEDICINE

## 2023-08-17 PROCEDURE — 99285 EMERGENCY DEPT VISIT HI MDM: CPT | Mod: 25

## 2023-08-17 PROCEDURE — 85027 COMPLETE CBC AUTOMATED: CPT | Performed by: EMERGENCY MEDICINE

## 2023-08-17 PROCEDURE — 86803 HEPATITIS C AB TEST: CPT | Performed by: EMERGENCY MEDICINE

## 2023-08-17 PROCEDURE — 84484 ASSAY OF TROPONIN QUANT: CPT | Performed by: EMERGENCY MEDICINE

## 2023-08-17 PROCEDURE — 93005 ELECTROCARDIOGRAM TRACING: CPT

## 2023-08-17 PROCEDURE — 87389 HIV-1 AG W/HIV-1&-2 AB AG IA: CPT | Performed by: EMERGENCY MEDICINE

## 2023-08-17 PROCEDURE — 86900 BLOOD TYPING SEROLOGIC ABO: CPT | Performed by: EMERGENCY MEDICINE

## 2023-08-17 PROCEDURE — 93010 ELECTROCARDIOGRAM REPORT: CPT | Mod: ,,, | Performed by: INTERNAL MEDICINE

## 2023-08-17 PROCEDURE — 93010 EKG 12-LEAD: ICD-10-PCS | Mod: ,,, | Performed by: INTERNAL MEDICINE

## 2023-08-17 PROCEDURE — 80053 COMPREHEN METABOLIC PANEL: CPT | Performed by: EMERGENCY MEDICINE

## 2023-08-17 PROCEDURE — 83880 ASSAY OF NATRIURETIC PEPTIDE: CPT | Performed by: EMERGENCY MEDICINE

## 2023-08-17 PROCEDURE — 25000003 PHARM REV CODE 250: Performed by: EMERGENCY MEDICINE

## 2023-08-17 RX ADMIN — POTASSIUM BICARBONATE 40 MEQ: 391 TABLET, EFFERVESCENT ORAL at 12:08

## 2023-08-17 NOTE — ED PROVIDER NOTES
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 Pt states her hemoglobin drops regularly. Pt is experiencing dizziness and fatigue.     73-year-old female with a history of myelodysplastic syndrome and heart valve replacement presents with fatigue and dizziness.  She regularly gets blood transfusions when she drops below a hemoglobin of 8.  She was 8.1 when she left [place] to come to [place] and see her brother who is in the surgical ICU.  She denies dark or bloody stools.  She is been compliant with her medication regimen.  She has chronic shortness of breath and nausea.  She denies vomiting, diarrhea, fever, cough, chest pain, abdominal pain, or dysuria.  The patients available PMH, PSH, Social History, medications, allergies, and triage vital signs were reviewed just prior to their medical evaluation.         Review of patient's allergies indicates:   Allergen Reactions    Sulfa (sulfonamide antibiotics) Hives     Other reaction(s): Hives     Past Medical History:   Diagnosis Date    Abnormal thallium stress test     2/17 Mild, moderate diagonal disease. Small distal LAD. Mild diastolic dysfunction.    Allergy     Anemia     Anticoagulant long-term use     ASA 81 mg    Anxiety     Atherosclerotic heart disease of native coronary artery with angina pectoris     2/17 [facility] Mild, moderate diagonal disease. Small distal LAD. Mild diastolic dysfunction.    Cataract     OU    Cholelithiasis     Chronic pain     Leg, lower back    Colon cancer     Colon polyp     DDD (degenerative disc disease), lumbar     h/o sciatica    Depression     Encounter for blood transfusion     Esophageal stenosis     Fatty liver     Fibromyalgia     Gastritis     GERD (gastroesophageal reflux disease)     hiatal as well    Hiatal hernia     History of psychiatric hospitalization     1997 at [place]    History of renal calculi      Hypersomnia with sleep apnea     uses C-pap    Hypothyroidism 1/28/2013    Hashimoto's, with Hypothyroidism    Irritable bowel syndrome     MDS (myelodysplastic syndrome) 1/28/2013    Dr. Xavier     Meningitis     age 3    Myelodysplastic syndrome     bone marrow cancer    Myeloid dysplasia     CORWIN (obstructive sleep apnea)     doesn't use her cpap    PAF (paroxysmal atrial fibrillation) 12/28/2012    PAF in setting of iatrogenic hyperthyroid                                   s/p AFL RFA 2008     Pancytopenia 11/18/2013    PVD (posterior vitreous detachment)     OU    Retinal detachment     HST-OS    Rheumatic fever     as a child    Supraventricular tachycardia     Thrombocytopenia      Past Surgical History:   Procedure Laterality Date    APPENDECTOMY      ATRIAL ABLATION SURGERY      BACK SURGERY      Right Spinal Cord Stimulator    BONE MARROW BIOPSY      myelodysplastic syndrome    BREAST BIOPSY Left 2016    fibrocystic    CHOLECYSTECTOMY  11/26/2001    Laparascopic    COLON SURGERY  7/2011  ([facility]?  [facility])     ~2011  ([facility]?  [facility])    Colon Resection:  [facility], for polyp with tubulovillous adenocarcinoma.    COLONOSCOPY  6/2015    Dr. orlando, repeat in 3 years    COLONOSCOPY N/A 10/22/2018    Procedure: COLONOSCOPY;  Surgeon: Cooper Orlando Jr., MD;  Location: [facility];  Service: Endoscopy;  Laterality: N/A;    COLONOSCOPY  10/22/2018    Dr. Orlando: 3 colon polyps removed, redundant colon, diverticulosis, repeat in 3 years for surveillance; biopsy tubular adenomas x 2, 1 hyperplastic polyp    COLONOSCOPY W/ BIOPSIES AND POLYPECTOMY  5/05/2010  [place]    COLONOSCOPY W/ POLYPECTOMY  6/19/2015  [facility]    Four 1 to 4 mm polyps at the hepatic flexure and distal ascending colon.  -Tubulovillous adenoma.   Diverticulosis in the sigmoid colon.  Patent end-to-end ileo-colonic anastomosis in the proximal/mid ascending colon.   Redundant colon.      Epidural Steroid Injection      Previous injection 17 years ago with  Dr. Pang, Pain Management, 1 with Dr. Samuels    EPIDURAL STEROID INJECTION INTO CERVICAL SPINE N/A 8/13/2019    Procedure: Injection-steroid-epidural-cervical;  Surgeon: Adan Samuels MD;  Location: [facility] OR;  Service: Pain Management;  Laterality: N/A;    ESOPHAGOGASTRODUODENOSCOPY  11/06/2008    GERD & Gastritis    ESOPHAGOGASTRODUODENOSCOPY N/A 6/6/2019    Procedure: EGD (ESOPHAGOGASTRODUODENOSCOPY);  Surgeon: Cooper Rodriguez Jr., MD;  Location: [facility] ENDO;  Service: Endoscopy;  Laterality: N/A;    EYE SURGERY      Focal Laser OU    HYSTERECTOMY      ovaries spared    LEFT HEART CATHETERIZATION Right 12/7/2018    Procedure: Left heart cath;  Surgeon: Deandre Wheatley MD;  Location: [facility] CATH;  Service: Cardiology;  Laterality: Right;    Lumbar Steroid Injection      Pain management    SCS REMOVAL  3/16/16    [facility]    SPINAL FUSION  1999    L4-L5    SPINE SURGERY  3/16/16    L4-5 PEDICLE SCREW & RODS REMOVED/[facility]    TUBAL LIGATION      UPPER GASTROINTESTINAL ENDOSCOPY  12/2015    Dr. Rodriguez    UPPER GASTROINTESTINAL ENDOSCOPY  06/06/2019    Dr. Rodriguez; gastritis; esophagus dilated    VAGINAL DELIVERY      times 3     Family History   Problem Relation Age of Onset    COPD Mother     Heart disease Mother     Cancer Mother         Breast    Breast cancer Mother 75    Stroke Father     Heart failure Father     Diabetes Father     Diabetes Sister     Crohn's disease Other     Colon cancer Neg Hx     Ulcerative colitis Neg Hx     Stomach cancer Neg Hx     Esophageal cancer Neg Hx      Social History     Tobacco Use    Smoking status: Never    Smokeless tobacco: Never   Substance Use Topics    Alcohol use: No    Drug use: No     Review of Systems   Constitutional:  Positive for fatigue. Negative for fever.   Respiratory:  Positive for shortness of breath. Negative for cough.    Cardiovascular:  Negative for chest pain.   Gastrointestinal:  Positive for nausea. Negative for abdominal pain, diarrhea and  vomiting.   Genitourinary:  Negative for dysuria.   Neurological:  Positive for dizziness and light-headedness.       Physical Exam     Initial Vitals [08/17/23 1100]   BP Pulse Resp Temp SpO2   114/69 76 16 98.9 °F (37.2 °C) 96 %      MAP       --         Physical Exam    Nursing note and vitals reviewed.  Constitutional: She appears well-developed and well-nourished. She is not diaphoretic. No distress.   HENT:   Head: Normocephalic and atraumatic.   Nose: Nose normal.   Eyes: Conjunctivae are normal. Right eye exhibits no discharge. Left eye exhibits no discharge.   Pale conjunctiva   Neck: Neck supple.   Normal range of motion.  Cardiovascular:  Normal rate, regular rhythm and normal heart sounds.     Exam reveals no gallop and no friction rub.       No murmur heard.  Pulmonary/Chest: Breath sounds normal. No respiratory distress. She has no wheezes. She has no rhonchi. She has no rales.   Abdominal: Abdomen is soft. She exhibits no distension. There is no abdominal tenderness. There is no rebound and no guarding.   Musculoskeletal:         General: No tenderness or edema. Normal range of motion.      Cervical back: Normal range of motion and neck supple.     Neurological: She is alert and oriented to person, place, and time. She has normal strength. GCS score is 15. GCS eye subscore is 4. GCS verbal subscore is 5. GCS motor subscore is 6.   Skin: Skin is warm and dry. No rash noted. No erythema.   Psychiatric: She has a normal mood and affect. Her behavior is normal. Judgment and thought content normal.         ED Course   Procedures  Labs Reviewed   CBC W/ AUTO DIFFERENTIAL - Abnormal; Notable for the following components:       Result Value    WBC 1.57 (*)     RBC 2.53 (*)     Hemoglobin 8.6 (*)     Hematocrit 26.1 (*)      (*)     MCH 34.0 (*)     RDW 22.0 (*)     All other components within normal limits    Narrative:     Release to patient->Immediate  WBC  critical result(s) called and verbal  readback obtained from   JEB WEAVER MD. by MRV1 08/17/2023 12:16   COMPREHENSIVE METABOLIC PANEL - Abnormal; Notable for the following components:    Potassium 3.0 (*)     All other components within normal limits    Narrative:     Release to patient->Immediate   BASIC METABOLIC PANEL - Abnormal; Notable for the following components:    Potassium 3.0 (*)     All other components within normal limits    Narrative:     Release to patient->Immediate   B-TYPE NATRIURETIC PEPTIDE - Abnormal; Notable for the following components:     (*)     All other components within normal limits   TROPONIN I    Narrative:     Release to patient->Immediate   HIV 1 / 2 ANTIBODY    Narrative:     Release to patient->Immediate   HEPATITIS C ANTIBODY    Narrative:     Release to patient->Immediate   TYPE & SCREEN     EKG Readings: (Independently Interpreted)   Initial Reading: No STEMI. Rhythm: Accelerated Junctional Rhythm. Heart Rate: 67. Ectopy: No Ectopy. Conduction: Normal.   Limited by artifact, long QT, nonspecific ST and T wave changes   Other EKG Interpretations: Independently interpreted by MD:  Rate 63, atrial paced, no stemi, no ectopy, no hypertrophy, nonspecific ST and T wave changes shadi ant/lat        ECG Results              EKG 12-lead (Final result)  Result time 08/17/23 15:12:17      Final result by Interface, Lab In [facility] (08/17/23 15:12:17)                   Narrative:    Test Reason : R53.83,    Vent. Rate : 067 BPM     Atrial Rate : 067 BPM     P-R Int : 000 ms          QRS Dur : 108 ms      QT Int : 482 ms       P-R-T Axes : 000 040 062 degrees     QTc Int : 509 ms    Accelerated Junctional rhythm  Nonspecific ST and T wave abnormality  Prolonged QT  Abnormal ECG  When compared with ECG of 05-AUG-2022 10:31,  Junctional rhythm has replaced Electronic atrial pacemaker    Confirmed by LOLI BARNHART MD ([id number]) on 8/17/2023 3:12:03 PM    Referred By: [email or web address]   SELF           Confirmed By:LOLI BARNHART  MD                                  Imaging Results              X-Ray Chest AP Portable (Final result)  Result time 08/17/23 14:01:08      Final result by Osei Ugalde MD (08/17/23 14:01:08)                   Impression:      Mild cardiomegaly.  No significant detrimental interval change in the appearance of the chest since 05/03/2019 is appreciated.      Electronically signed by: Osei Ugalde MD  Date:    08/17/2023  Time:    14:01               Narrative:    EXAMINATION:  XR CHEST AP PORTABLE    TECHNIQUE:  One view    COMPARISON:  Comparison is made to the most recent prior chest radiograph, dated 05/03/2019.    FINDINGS:  Cardiac pacemaker again observed, with note now made of a cardiac valvular prosthesis.  The heart is mildly enlarged, but the degree of cardiomegaly and the appearance of the cardiomediastinal silhouette have not changed appreciably since the examination referenced above.  Pulmonary vascularity is normal, and there are no findings indicating current cardiac decompensation.  Lung zones are clear, and are free of significant airspace consolidation or volume loss.  No pleural fluid.  No hilar or mediastinal mass lesion.  No pneumothorax.  Spinal stimulator is again noted, electrodes at the T9-T11 level.                                       Medications   potassium bicarbonate disintegrating tablet 40 mEq (40 mEq Oral Given 8/17/23 1229)     Medical Decision Making:   History:   I obtained history from: someone other than patient.       <> Summary of History: Family member assists HPI  Independently Interpreted Test(s):   I have ordered and independently interpreted EKG Reading(s) - see prior notes  Clinical Tests:   Lab Tests: Ordered and Reviewed  Medical Tests: Ordered and Reviewed  ED Management:  73-year-old female presents with fatigue and dizziness.  Vitals normal.  Physical exam as above.  EKGs without acute ischemia.  Labs improved.  Hg 8.6.  Will not transfuse.  K repleated.  Patient  improved.  Will DC so she can be with her ill brother.  Patient will call their primary physician tomorrow to schedule close follow-up. Patient will return to ED for worsening symptoms, inability to eat/drink, fever greater than 100.4, or any other concerns. Did bedside teaching with return precautions.  All questions answered.  The patient acknowledges understanding.  Gave verbal discharge instructions.                           Clinical Impression:   Final diagnoses:  [R53.83] Fatigue (Primary)  [R42] Lightheadedness  [Z86.2] History of myelodysplastic syndrome  [D63.0] Anemia in neoplastic disease        ED Disposition Condition    Discharge Stable          ED Prescriptions    None       Follow-up Information       Follow up With Specialties Details Why Contact Info    Follow up with primary physician as soon as possible.  Call tomorrow for an appointment.        [facility] Emergency Medicine  Return to ED for worsening symptoms, inability to eat/drink, fever greater than 100.4, or any other concerns. [address]  [facility] [id number]  [phone]             Ras Rodriguez MD  08/18/23 2080

## 2023-08-17 NOTE — ED TRIAGE NOTES
"Charmaine Whalen, a 73 y.o. female presents to the ED w/ complaint of dizziness and lightheadedness.  Patient states "I think my hemoglobin is low.  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 Pt states her hemoglobin drops regularly. Pt is experiencing dizziness and fatigue.     Review of patient's allergies indicates:   Allergen Reactions    Sulfa (sulfonamide antibiotics) Hives     Other reaction(s): Hives     Past Medical History:   Diagnosis Date    Abnormal thallium stress test     2/17 Mild, moderate diagonal disease. Small distal LAD. Mild diastolic dysfunction.    Allergy     Anemia     Anticoagulant long-term use     ASA 81 mg    Anxiety     Atherosclerotic heart disease of native coronary artery with angina pectoris     2/17 [facility] Mild, moderate diagonal disease. Small distal LAD. Mild diastolic dysfunction.    Cataract     OU    Cholelithiasis     Chronic pain     Leg, lower back    Colon cancer     Colon polyp     DDD (degenerative disc disease), lumbar     h/o sciatica    Depression     Encounter for blood transfusion     Esophageal stenosis     Fatty liver     Fibromyalgia     Gastritis     GERD (gastroesophageal reflux disease)     hiatal as well    Hiatal hernia     History of psychiatric hospitalization     1997 at [place]    History of renal calculi     Hypersomnia with sleep apnea     uses C-pap    Hypothyroidism 1/28/2013    Hashimoto's, with Hypothyroidism    Irritable bowel syndrome     MDS (myelodysplastic syndrome) 1/28/2013    Dr. Xavier     Meningitis     age 3    Myelodysplastic syndrome     bone marrow cancer    Myeloid dysplasia     CORWIN (obstructive sleep apnea)     doesn't use her cpap    PAF (paroxysmal atrial fibrillation) 12/28/2012    PAF in setting of iatrogenic hyperthyroid     "                               s/p AFL RFA 2008     Pancytopenia 11/18/2013    PVD (posterior vitreous detachment)     OU    Retinal detachment     HST-OS    Rheumatic fever     as a child    Supraventricular tachycardia     Thrombocytopenia

## 2025-01-16 NOTE — TELEPHONE ENCOUNTER
Hpi Title: Evaluation of Skin Lesions Pt agrees to see NP 05/27   Additional History: Mole check on the back. Corn on foot\\n\\nHats, sun shirts, does not use spf as much, stays out of sun
